# Patient Record
Sex: MALE | Race: WHITE | NOT HISPANIC OR LATINO | Employment: UNEMPLOYED | URBAN - METROPOLITAN AREA
[De-identification: names, ages, dates, MRNs, and addresses within clinical notes are randomized per-mention and may not be internally consistent; named-entity substitution may affect disease eponyms.]

---

## 2019-01-31 ENCOUNTER — MEDICAL CORRESPONDENCE (OUTPATIENT)
Dept: HEALTH INFORMATION MANAGEMENT | Facility: CLINIC | Age: 49
End: 2019-01-31

## 2019-03-04 ENCOUNTER — TRANSFERRED RECORDS (OUTPATIENT)
Dept: HEALTH INFORMATION MANAGEMENT | Facility: CLINIC | Age: 49
End: 2019-03-04

## 2019-12-17 ENCOUNTER — TRANSFERRED RECORDS (OUTPATIENT)
Dept: HEALTH INFORMATION MANAGEMENT | Facility: CLINIC | Age: 49
End: 2019-12-17

## 2020-03-13 ENCOUNTER — VIRTUAL VISIT (OUTPATIENT)
Dept: FAMILY MEDICINE | Facility: OTHER | Age: 50
End: 2020-03-13

## 2020-03-18 NOTE — PROGRESS NOTES
"Date: 2020 14:13:59  Clinician: Ekta Soliman  Clinician NPI: [id number]  Patient: Ming Uribe  Patient : 1970  Patient Address: [address] [place] [address]  Patient Phone: [phone]  Visit Protocol: URI  Patient Summary:  Ming is a 49 year old ( : 1970 ) male who initiated a Visit for COVID-19 (Coronavirus) evaluation and screening. When asked the question \"Please sign me up to receive news, health information and promotions. \", Ming responded \"No\".    Ming states his symptoms started gradually 7-9 days ago. After his symptoms started, they improved and then got worse again.   His symptoms consist of chills, wheezing, a sore throat, a cough, nasal congestion, tooth pain, ear pain, malaise, a headache, rhinitis, facial pain or pressure, and myalgia. He is experiencing mild difficulty breathing with activities but can speak normally in full sentences. Ming also feels feverish.   Symptom details     Nasal secretions: The color of his mucus is yellow.    Cough: Ming coughs every 5-10 minutes and his cough is more bothersome at night. Phlegm does not come into his throat when he coughs. He believes his cough is caused by post-nasal drip.     Sore throat: Ming reports having mild throat pain (1-3 on a 10 point pain scale), does not have exudate on his tonsils, and can swallow liquids. He is not sure if the lymph nodes in his neck are enlarged. A rash has not appeared on the skin since the sore throat started.     Temperature: His current temperature is 98.8 degrees Fahrenheit.     Wheezing: Ming has been diagnosed with asthma. The wheezing interferes with his normal daily activities.    Facial pain or pressure: The facial pain or pressure does not feel worse when bending or leaning forward.     Headache: He states the headache is moderate (4-6 on a 10 point pain scale).     Tooth pain: The tooth pain is not caused by a cavity, recent dental work, or other " mouth problems.      He denies taking antibiotic medication for the symptoms and having recent facial or sinus surgery in the past 60 days.   Precipitating events  Ming is not sure if he has been exposed to someone with strep throat. He has not recently been exposed to someone with influenza. Ming has not been in close contact with any high risk individuals.   Pertinent COVID-19 (Coronavirus) information  Ming has traveled internationally or to the areas where COVID-19 (Coronavirus) is widespread in the last 14 days before the start of his symptoms. Countries or locations traveled as reported by the patient (free text): [facility]   Ming has not had close contact with a laboratory-confirmed positive COVID-19 patient or someone under quarantine for suspected COVID-19 within 14 days of symptom onset.   Ming is not a healthcare worker or does not work in a healthcare facility.   Triage Point(s) temporarily suspended for COVID-19 (Coronavirus) screening  Ming reported the following symptoms which were previously protocol referral points. These protocol referral points have temporarily been removed for purposes of COVID-19 (Coronavirus) screening.   Wheezing that keeps Ming from doing daily activities   Pertinent medical history  Ming had 1 sinus infection within the past year.   Ming needs a return to work/school note.   Weight: 184 lbs   Ming does not smoke or use smokeless tobacco.   Weight: 184 lbs    MEDICATIONS: Spiriva Respimat inhalation, lisinopril oral, Synthroid oral, Ventolin HFA inhalation, Advair HFA inhalation, montelukast oral, ALLERGIES: NKDA  Clinician Response:  Dear Ming,  Based on the information provided, you have viral sinusitis, also known as a sinus infection. Sinus infections are caused by bacteria or a virus and symptoms are almost always identical. The difference between the 2 types of infections is timing.  Sinus infections start as viral infections and  symptoms improve on their own in about 7 days. If symptoms have not improved after 7 days or have even worsened, a bacterial infection may have developed.  Medication information  Because you have a viral infection, antibiotics will not help you get better. Treating a viral infection with antibiotics could actually make you feel worse.  Unless you are allergic to the over-the-counter medication(s) below, I recommend using:       Acetaminophen (Tylenol or store brand) oral tablet. Take 1-2 tablets by mouth every 4-6 hours to help with the discomfort.      Ibuprofen (Advil or store brand) 200 mg oral tablet. Take 1-3 tablets (200-600 mg) by mouth every 8 hours to help with the discomfort. Make sure to take the ibuprofen with food. Do not exceed 2400 mg in 24 hours.    A sinus irrigation kit such as Sinus Rinse, Neti Pot, SinuCleanse, or store brand. Be sure to use sterile or previously boiled water to prevent unwanted infections.      Oxymetazoline (Afrin or store brand) nasal spray. Use 1 spray in each nostril 2 times a day for a maximum of 3 days. Using this medication more frequently or longer than recommended may cause nasal congestion to reoccur or worsen. Sinus pressure occurs when the tissues lining your sinuses become swollen and inflamed. Afrin nasal spray decreases the swelling to provide the quickest and most effective relief from sinus pressure.      Over-the-counter medications do not require a prescription. Ask the pharmacist if you have any questions.  Self care  The following tips will keep you as comfortable as possible while you recover:     Rest    Drink plenty of water and other liquids    Take a hot shower to loosen congestion    Use throat lozenges    Gargle with warm salt water (1/4 teaspoon of salt per 8 ounce glass of water)    Suck on frozen items such as popsicles or ice cubes    Drink hot tea with lemon and honey    Take a spoonful of honey to reduce your cough     When to seek care  Please  be seen in a clinic or urgent care if new symptoms develop, or symptoms become worse.  Call 911 or go to the emergency room if you feel that your throat is closing off, you suddenly develop a rash, you are unable to swallow fluids, you are drooling, or you are having difficulty breathing.  Additional treatment plan   Dear Ming,  Based on the information you have provided, it does not appear you need Coronavirus (COVID-19) testing.   At this time, we recommend testing primarily for those people who have symptoms of cough and fever and have either traveled to a known area of infection or have been exposed to someone with laboratory confirmed Coronavirus by close contact.   Coronavirus - General Information:   The coronavirus infection starts within 14 days of an exposure.  Symptoms are those of a respiratory infection (such as fever, cough).   If you have not had symptoms by day 15, you should be considered uninfected by coronavirus.   Coronavirus - Symptoms:    The coronavirus can cause a respiratory illness, such as bronchitis or pneumonia.  The most common symptoms are: cough, fever, and shortness of breath.   Other symptoms are: body aches, chills, diarrhea, fatigue, headache, runny nose, and sore throat   Coronavirus - Exposure Risk Factors:   Exposure to a person who has been diagnosed with coronavirus.  Travel from an area with recent local transmission of coronavirus.  The CDC (www.cdc.gov) has the most up-to-date list of where the coronavirus outbreak is occurring.   Coronavirus - Spreading:    The virus likely spreads through respiratory droplets produced when a person coughs or sneezes. These respiratory droplets can travel approximately 6 feet and can remain on surfaces. Common disinfectants will kill the virus.  The CDC currently does not recommend healthy people wear masks.   Coronavirus - Protect Yourself:    Avoid close contact with people known to have this new coronavirus infection.  Wash hands  often with soap and water or alcohol-based hand .  Avoid touching the eyes, nose or mouth.   Thank you for limiting contact with others, wearing a simple mask to cover your cough, practice good hand hygiene habits and accessing our virtual services where possible to limit the spread of this virus.  For more information about COVID19 and options for caring for yourself at home, please visit the CDC website at https://www.cdc.gov/coronavirus/2019-ncov/about/steps-when-sick.html   For more options for care at [facility], please visit our website at [email or web address]     COVID-19 (Coronavirus) General Information  With the increase in the number of COVID-19 (Coronavirus) cases, we understand you may have some questions. Below is some helpful information on COVID-19 (Coronavirus).  How can I protect myself and others from the COVID-19 (Coronavirus)?  Because there is currently no vaccine to prevent infection, the best way to protect yourself is to avoid being exposed to this virus. Put distance between yourself and other people if COVID-19 (Coronavirus) is spreading in your community. The virus is thought to spread mainly from person-to-person.     Between people who are in close contact with one another (within about 6 about) for prolonged period (10 minutes or longer).    Through respiratory droplets produced when an infected person coughs or sneezes.     The CDC recommends the following additional steps to protect yourself and others:     Wash your hands often with soap and water for at least 20 seconds, especially after blowing your nose, coughing, or sneezing; going to the bathroom; and before eating or preparing food.  Use an alcohol-based hand  that contains at least 60 percent alcohol if soap and water are not available.        Avoid touching your eyes, nose and mouth with unwashed hands.    Avoid close contact with people who are sick.    Stay home when you  are sick.    Cover your cough or sneeze with a tissue, then throw the tissue in the trash.    Clean and disinfect frequently touched objects and surfaces.     You can help stop COVID-19 (Coronavirus) by knowing the signs and symptoms:     Fever    Cough    Shortness of breath     Contact your healthcare provider if   Develop symptoms   AND   Have been in close contact with a person known to have COVID-19 (Coronavirus) or live in or have recently traveled from an area with ongoing spread of COVID-19 (Coronavirus). Call ahead before you go to a doctor's office or emergency room. Tell them about your recent travel and your symptoms.   For the most up to date information, visit the CDC's website.  Steps to help prevent the spread of COVID-19 (Coronavirus) if you are sick  If you are sick with COVID-19 (Coronavirus) or suspect you are infected with the virus that causes COVID-19 (Coronavirus), follow the steps below to help prevent the disease from spreading&nbsp;to people in your home and community.     Stay home except to get medical care. Home isolation may be started in consultation with your healthcare clinician.    Separate yourself from other people and animals in your home.    Call ahead before visiting your doctor if you have a medical appointment.    Wear a facemask when you are around other people.    Cover your cough and sneezes.    Clean your hands often.    Avoid sharing personal household items.    Clean and disinfect frequently touched objects and surfaces everyday.    You will need to have someone drop off medications or household supplies (if needed) at your house without coming inside or in contact with you or others living in your house.    Monitor your symptoms and seek prompt medical care if your illness is worsening (e.g. Difficulty breathing).    Discontinue home isolation only in consultation with your healthcare provider.     For more detailed and up to date information on what to do if you are  "sick, visit this link: What to Do If You Are Sick With Coronavirus Disease 2019 (COVID-19).  Do I need to be tested for COVID-19 (Coronavirus)?     At this time, the limited number of tests available are controlled by the state and local health departments and are being reserved for more seriously ill patients, those with known exposure to confirmed patients, and those with recent travel (within 14 days) to countries with high rates of COVID-19 (Coronavirus).    Decisions on which patients receive testing will be based on the local spread of COVID-19 (Coronavirus) as well as the symptoms. Your healthcare provider will make the final decision on whether you should be tested.    In the meantime, if you have concerns that you may have been exposed, it is reasonable to practice \"social distancing.\"&nbsp; If you are ill with a cold or flu-like illness, please monitor your symptoms and reach out to your healthcare provider if your symptoms worsen.    For more up to date information, visit this link: COVID-19 (Coronavirus) Frequently Asked Questions and Answers.      Diagnosis: Cough  Diagnosis ICD: R05  Additional Clinician Notes: If your symptoms are not improving over the next 5 days, please submit another [facility] visit for recheck.  If you get a fever of 100.4 degrees or higher, please submit another [facility] visit for recheck.  "

## 2020-04-22 ENCOUNTER — TRANSFERRED RECORDS (OUTPATIENT)
Dept: HEALTH INFORMATION MANAGEMENT | Facility: CLINIC | Age: 50
End: 2020-04-22

## 2020-08-03 ENCOUNTER — TRANSFERRED RECORDS (OUTPATIENT)
Dept: HEALTH INFORMATION MANAGEMENT | Facility: CLINIC | Age: 50
End: 2020-08-03

## 2020-08-27 NOTE — TELEPHONE ENCOUNTER
RECORDS RECEIVED FROM:  - Jaundice and enlarged liver    Appt Date: 09.02.2020   NOTES STATUS DETAILS   OFFICE NOTE from referring provider N/A    OFFICE NOTES from other specialists Internal    Care Everywhere 08.28.2020    Sofi Webster NP        08.25.2020 Alejandro Ko MD  [facility]   DISCHARGE SUMMARY from hospital Care Everywhere 12.21.2019 [place] Novak [place]  [facility]   MEDICATION LIST N/A    LIVER BIOSPY (IF APPLICABLE)      PATHOLOGY REPORTS  N/A    IMAGING     ENDOSCOPY (IF AVAILABLE) N/A    COLONOSCOPY (IF AVAILABLE) N/A    ULTRASOUND LIVER Care Everywhere 08.24.2020 US Liver   CT OF ABDOMEN Care Everywher 08.12.2020 CT Ab Pelvis   MRI OF LIVER N/A    FIBROSCAN, US ELASTOGRAPHY, FIBROSIS SCAN, MR ELASTOGRAPHY N/A    LABS     HEPATIC PANEL (LIVER PANEL) N/A    BASIC METABOLIC PANEL N/A    COMPLETE METABOLIC PANEL Future 08.28.2020   COMPLETE BLOOD COUNT (CBC) Future 08.28.2020   INTERNATIONAL NORMALIZED RATIO (INR) N/A    HEPATITIS C ANTIBODY N/A    HEPATITIS C VIRAL LOAD/PCR N/A    HEPATITIS C GENOTYPE N/A    HEPATITIS B SURFACE ANTIGEN N/A    HEPATITIS B SURFACE ANTIBODY N/A    HEPATITIS B DNA QUANT LEVEL N/A    HEPATITIS B CORE ANTIBODY N/A      Action 08.27.2020 RM   Action Taken Sent fax request to [facility] for records.  Fax to [phone]. Pending    08.31.2020 Can access some of the pt info from , pending for images.     09.01.2020 [place] [facility] [place] verify if they received the fax to push over images, called [phone] opt 2 spoke to a rep who states she received the request and if working on sending the images over.    09.01.2020 All images received and uploaded to pt chart.

## 2020-08-28 ENCOUNTER — OFFICE VISIT (OUTPATIENT)
Dept: FAMILY MEDICINE | Facility: CLINIC | Age: 50
End: 2020-08-28
Payer: COMMERCIAL

## 2020-08-28 VITALS
OXYGEN SATURATION: 98 % | WEIGHT: 175.5 LBS | BODY MASS INDEX: 25.13 KG/M2 | DIASTOLIC BLOOD PRESSURE: 92 MMHG | HEART RATE: 98 BPM | TEMPERATURE: 98.1 F | HEIGHT: 70 IN | SYSTOLIC BLOOD PRESSURE: 131 MMHG | RESPIRATION RATE: 16 BRPM

## 2020-08-28 DIAGNOSIS — J45.40 MODERATE PERSISTENT ASTHMA WITHOUT COMPLICATION: ICD-10-CM

## 2020-08-28 DIAGNOSIS — F10.21 ALCOHOL DEPENDENCE IN REMISSION (H): ICD-10-CM

## 2020-08-28 DIAGNOSIS — Z00.00 ENCOUNTER FOR MEDICAL EXAMINATION TO ESTABLISH CARE: ICD-10-CM

## 2020-08-28 DIAGNOSIS — E03.9 HYPOTHYROIDISM, UNSPECIFIED TYPE: ICD-10-CM

## 2020-08-28 DIAGNOSIS — R17 JAUNDICE: Primary | ICD-10-CM

## 2020-08-28 RX ORDER — MONTELUKAST SODIUM 10 MG/1
10 TABLET ORAL AT BEDTIME
COMMUNITY
Start: 2019-12-13 | End: 2021-06-01

## 2020-08-28 RX ORDER — GLUCOSA SU 2KCL/CHONDROITIN SU 500-400 MG
1 CAPSULE ORAL
COMMUNITY
Start: 2020-08-25

## 2020-08-28 RX ORDER — ALBUTEROL SULFATE 90 UG/1
1-2 AEROSOL, METERED RESPIRATORY (INHALATION)
COMMUNITY
Start: 2019-12-13 | End: 2020-12-15

## 2020-08-28 RX ORDER — GABAPENTIN 100 MG/1
100 CAPSULE ORAL
COMMUNITY
Start: 2020-08-25 | End: 2020-09-18

## 2020-08-28 RX ORDER — LEVOTHYROXINE SODIUM 175 UG/1
150 TABLET ORAL DAILY
COMMUNITY
Start: 2019-12-13 | End: 2021-01-05

## 2020-08-28 ASSESSMENT — ANXIETY QUESTIONNAIRES
1. FEELING NERVOUS, ANXIOUS, OR ON EDGE: NOT AT ALL
6. BECOMING EASILY ANNOYED OR IRRITABLE: NOT AT ALL
GAD7 TOTAL SCORE: 2
7. FEELING AFRAID AS IF SOMETHING AWFUL MIGHT HAPPEN: SEVERAL DAYS
2. NOT BEING ABLE TO STOP OR CONTROL WORRYING: NOT AT ALL
3. WORRYING TOO MUCH ABOUT DIFFERENT THINGS: SEVERAL DAYS
5. BEING SO RESTLESS THAT IT IS HARD TO SIT STILL: NOT AT ALL
IF YOU CHECKED OFF ANY PROBLEMS ON THIS QUESTIONNAIRE, HOW DIFFICULT HAVE THESE PROBLEMS MADE IT FOR YOU TO DO YOUR WORK, TAKE CARE OF THINGS AT HOME, OR GET ALONG WITH OTHER PEOPLE: SOMEWHAT DIFFICULT

## 2020-08-28 ASSESSMENT — PATIENT HEALTH QUESTIONNAIRE - PHQ9
5. POOR APPETITE OR OVEREATING: NOT AT ALL
SUM OF ALL RESPONSES TO PHQ QUESTIONS 1-9: 12

## 2020-08-28 ASSESSMENT — MIFFLIN-ST. JEOR: SCORE: 1672.31

## 2020-08-28 ASSESSMENT — PAIN SCALES - GENERAL: PAINLEVEL: EXTREME PAIN (9)

## 2020-08-28 NOTE — NURSING NOTE
"49 year old  Chief Complaint   Patient presents with     [facility]     Patient comes in to [facility].        Blood pressure (!) 131/92, pulse 98, temperature 98.1  F (36.7  C), temperature source Oral, resp. rate 16, height 1.778 m (5' 10\"), weight 79.6 kg (175 lb 8 oz), SpO2 98 %. Body mass index is 25.18 kg/m .  BP completed using cuff size:    There is no problem list on file for this patient.      Wt Readings from Last 2 Encounters:   08/28/20 79.6 kg (175 lb 8 oz)     BP Readings from Last 3 Encounters:   08/28/20 (!) 131/92       Allergies   Allergen Reactions     No Clinical Screening - See Comments Other (See Comments)     Grass, weeds, mold, dust       Current Outpatient Medications   Medication     albuterol (PROAIR HFA/PROVENTIL HFA/VENTOLIN HFA) 108 (90 Base) MCG/ACT inhaler     fluticasone-salmeterol (ADVAIR) 250-50 MCG/DOSE inhaler     levothyroxine (SYNTHROID/LEVOTHROID) 175 MCG tablet     montelukast (SINGULAIR) 10 MG tablet     No current facility-administered medications for this visit.        Social History     Tobacco Use     Smoking status: Never Smoker     Smokeless tobacco: Never Used   Substance Use Topics     Alcohol use: None     Drug use: None       Honoring Choices - Health Care Directive Guide offered to patient at time of visit.    Health Maintenance Due   Topic Date Due     PREVENTIVE CARE VISIT  1970     HIV SCREENING  12/30/1986     DTAP/TDAP/TD IMMUNIZATION (1 - Tdap) 12/30/1996     LIPID  12/30/2006     PHQ-2  01/01/2020     INFLUENZA VACCINE (1) 09/01/2020         There is no immunization history on file for this patient.    No results found for: PAP      No lab results found.    No flowsheet data found.    No flowsheet data found.    No flowsheet data found.    No flowsheet data found.    Gamal Roland, ALISSA  August 28, 2020 10:45 AM      Healthy Habits:    Do you get at least three servings of calcium containing foods daily (dairy, green leafy vegetables, etc.)? 3 " servings of dairy, fruits. No veggies, grains.    Amount of exercise or daily activities, outside of work: 0 hours    Have you had an eye exam in the past two years? Yes    Do you see a dentist twice per year? Yes    Do you have sleep apnea, excessive snoring or daytime drowsiness? No    Sleep: 4-5 hours/night. Sleep disruptions: Yes, wakes up 2-3 times per night.    Caffeine: One cup green tea daily.    Water: Ten 10oz. Glasses daily.    How much exercise per week?  How much calcium per day?      How much caffeine per day?   How much vitamin D per day?   Do you/your family wear seatbelts? Yes  Do you/your family use safety helmets? Yes  Do you/your family use sunscreen? Yes  Do you/your family keep firearms in the home? No  Do you/your family have a smoke detector(s)? Yes    Do you feel safe in your home? Yes  Has anyone ever touched you in an unwanted manner?   Explain. No
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
## 2020-08-28 NOTE — PATIENT INSTRUCTIONS

## 2020-08-29 ASSESSMENT — ANXIETY QUESTIONNAIRES: GAD7 TOTAL SCORE: 2

## 2020-08-31 DIAGNOSIS — Z00.00 ENCOUNTER FOR MEDICAL EXAMINATION TO ESTABLISH CARE: ICD-10-CM

## 2020-08-31 DIAGNOSIS — R17 JAUNDICE: ICD-10-CM

## 2020-08-31 DIAGNOSIS — E03.9 HYPOTHYROIDISM, UNSPECIFIED TYPE: ICD-10-CM

## 2020-08-31 LAB
ALBUMIN SERPL-MCNC: 2.7 G/DL (ref 3.4–5)
ALP SERPL-CCNC: 148 U/L (ref 40–150)
ALT SERPL W P-5'-P-CCNC: 244 U/L (ref 0–70)
ANION GAP SERPL CALCULATED.3IONS-SCNC: 10 MMOL/L (ref 3–14)
AST SERPL W P-5'-P-CCNC: 341 U/L (ref 0–45)
BILIRUB SERPL-MCNC: 10.5 MG/DL (ref 0.2–1.3)
BUN SERPL-MCNC: 9 MG/DL (ref 7–30)
CALCIUM SERPL-MCNC: 9.2 MG/DL (ref 8.5–10.1)
CHLORIDE SERPL-SCNC: 100 MMOL/L (ref 94–109)
CHOLEST SERPL-MCNC: 290 MG/DL
CO2 SERPL-SCNC: 23 MMOL/L (ref 20–32)
CREAT SERPL-MCNC: 0.74 MG/DL (ref 0.66–1.25)
ERYTHROCYTE [DISTWIDTH] IN BLOOD BY AUTOMATED COUNT: 13.5 % (ref 10–15)
GFR SERPL CREATININE-BSD FRML MDRD: >90 ML/MIN/{1.73_M2}
GLUCOSE SERPL-MCNC: 130 MG/DL (ref 70–99)
HCT VFR BLD AUTO: 38.2 % (ref 40–53)
HDLC SERPL-MCNC: 12 MG/DL
HGB BLD-MCNC: 12.3 G/DL (ref 13.3–17.7)
LDLC SERPL CALC-MCNC: ABNORMAL MG/DL
LDLC SERPL DIRECT ASSAY-MCNC: 246 MG/DL
MCH RBC QN AUTO: 35.2 PG (ref 26.5–33)
MCHC RBC AUTO-ENTMCNC: 32.2 G/DL (ref 31.5–36.5)
MCV RBC AUTO: 110 FL (ref 78–100)
NONHDLC SERPL-MCNC: 279 MG/DL
PLATELET # BLD AUTO: 528 10E9/L (ref 150–450)
POTASSIUM SERPL-SCNC: 3.5 MMOL/L (ref 3.4–5.3)
PROT SERPL-MCNC: 7.1 G/DL (ref 6.8–8.8)
RBC # BLD AUTO: 3.49 10E12/L (ref 4.4–5.9)
SODIUM SERPL-SCNC: 133 MMOL/L (ref 133–144)
TRIGL SERPL-MCNC: 471 MG/DL
TSH SERPL DL<=0.005 MIU/L-ACNC: 1.22 MU/L (ref 0.4–4)
WBC # BLD AUTO: 9 10E9/L (ref 4–11)

## 2020-09-02 ENCOUNTER — PRE VISIT (OUTPATIENT)
Dept: GASTROENTEROLOGY | Facility: CLINIC | Age: 50
End: 2020-09-02

## 2020-09-02 ENCOUNTER — VIRTUAL VISIT (OUTPATIENT)
Dept: GASTROENTEROLOGY | Facility: CLINIC | Age: 50
End: 2020-09-02
Attending: INTERNAL MEDICINE
Payer: COMMERCIAL

## 2020-09-02 DIAGNOSIS — G62.1 ALCOHOL-INDUCED POLYNEUROPATHY (H): ICD-10-CM

## 2020-09-02 DIAGNOSIS — K70.10 ALCOHOLIC HEPATITIS WITHOUT ASCITES (H): Primary | ICD-10-CM

## 2020-09-02 RX ORDER — GABAPENTIN 300 MG/1
300 CAPSULE ORAL 3 TIMES DAILY
Qty: 90 CAPSULE | Refills: 4 | Status: SHIPPED | OUTPATIENT
Start: 2020-09-02 | End: 2021-04-21

## 2020-09-02 ASSESSMENT — PAIN SCALES - GENERAL: PAINLEVEL: SEVERE PAIN (6)
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
## 2020-09-03 ENCOUNTER — NURSE TRIAGE (OUTPATIENT)
Dept: NURSING | Facility: CLINIC | Age: 50
End: 2020-09-03

## 2020-09-03 NOTE — TELEPHONE ENCOUNTER
Brandon calling today reporting over the past 3 days he has been experiencing intermittent episodes of dizziness, lasts 1-2 minutes, occurs when stands up.  He did tell GI provider this yesterday at virtual visit, was advised most likely due to dehydration, which Ming reports could be possible, as reports hasn't been drinking much water.  Today , after he called because experienced dizziness, drank a bottle of gatorade and feels much better now. Advised to continue with good fluid intake, good diet.  Agrees to appointment before current one scheduled for 9/11/20 with Sofi Webster.    Has been instructed ifdizziness continues, increases, occurs more often , accompanied by chest pain, shortness of breath is to call 911/ED.  Agrees    Additional Information    Negative: Shock suspected (e.g., cold/pale/clammy skin, too weak to stand, low BP, rapid pulse)    Negative: Difficult to awaken or acting confused (e.g., disoriented, slurred speech)    Negative: Fainted, and still feels dizzy afterwards    Negative: Severe difficulty breathing (e.g., struggling for each breath, speaks in single words)    Negative: Overdose (accidental or intentional) of medications    Negative: New neurologic deficit that is present now: * Weakness of the face, arm, or leg on one side of the body * Numbness of the face, arm, or leg on one side of the body * Loss of speech or garbled speech    Negative: Heart beating < 50 beats per minute OR > 140 beats per minute    Negative: Sounds like a life-threatening emergency to the triager    Negative: Chest pain    Negative: Rectal bleeding, bloody stool, or tarry-black stool    Negative: Vomiting is the main symptom    Negative: Diarrhea is the main symptom    Negative: Headache is the main symptom    Negative: Heat exhaustion suspected (i.e., dehydration from heat exposure)    Negative: Patient states that he/she is having an anxiety/panic attack    Negative: SEVERE dizziness (e.g., unable to  "stand, requires support to walk, feels like passing out now)    Negative: SEVERE headache or neck pain    Negative: Spinning or tilting sensation (vertigo) present now and one or more stroke risk factors (i.e., hypertension, diabetes, prior stroke/TIA, heart attack, age over 60) (Exception: prior physician evaluation for this AND no different/worse than usual)    Negative: Loss of vision or double vision    Negative: Extra heart beats OR irregular heart beating (i.e., 'palpitations')    Negative: Difficulty breathing    Negative: Drinking very little and has signs of dehydration (e.g., no urine > 12 hours, very dry mouth, very lightheaded)    Negative: Follows bleeding (e.g., stomach, rectum, vagina) (Exception: became dizzy from sight of small amount blood)    Negative: Patient sounds very sick or weak to the triager    Negative: Lightheadedness (dizziness) present now, after 2 hours of rest and fluids    Negative: Spinning or tilting sensation (vertigo) present now    Negative: Fever > 103 F (39.4 C)    Negative: Fever > 100.0 F (37.8 C) and has diabetes mellitus or a weak immune system (e.g., HIV positive, cancer chemotherapy, organ transplant, splenectomy, chronic steroids)    Patient wants to be seen    Answer Assessment - Initial Assessment Questions  1. DESCRIPTION: \"Describe your dizziness.\"      dizziness  2. LIGHTHEADED: \"Do you feel lightheaded?\" (e.g., somewhat faint, woozy, weak upon standing)      Lightheaded, feels may pass out, everything turns white and feels weak  3. VERTIGO: \"Do you feel like either you or the room is spinning or tilting?\" (i.e. vertigo)      No  4. SEVERITY: \"How bad is it?\"  \"Do you feel like you are going to faint?\" \"Can you stand and walk?\"    - MILD - walking normally    - MODERATE - interferes with normal activities (e.g., work, school)     - SEVERE - unable to stand, requires support to walk, feels like passing out now.       When occurs severe  5. ONSET:  \"When did the " "dizziness begin?\"      When stands up, lasts about 1 minute  6. AGGRAVATING FACTORS: \"Does anything make it worse?\" (e.g., standing, change in head position)      Standing makes it worse  7. HEART RATE: \"Can you tell me your heart rate?\" \"How many beats in 15 seconds?\"  (Note: not all patients can do this)        96 beats per minute, oxygen saturation 96 on room air  8. CAUSE: \"What do you think is causing the dizziness?\"      He talked to GI yesterday was told probably dehydrated  9. RECURRENT SYMPTOM: \"Have you had dizziness before?\" If so, ask: \"When was the last time?\" \"What happened that time?\"      Started upon discharge 8/15/20, last one experienced was 8/18, then restarted 3 days ago  10. OTHER SYMPTOMS: \"Do you have any other symptoms?\" (e.g., fever, chest pain, vomiting, diarrhea, bleeding)        Denies fever, chest pain, vomiting, diarrhea or bleeding  11. PREGNANCY: \"Is there any chance you are pregnant?\" \"When was your last menstrual period?\"        N/A    Protocols used: DIZZINESS-A-OH      "

## 2020-09-04 ENCOUNTER — OFFICE VISIT (OUTPATIENT)
Dept: FAMILY MEDICINE | Facility: CLINIC | Age: 50
End: 2020-09-04
Payer: COMMERCIAL

## 2020-09-04 VITALS
OXYGEN SATURATION: 95 % | SYSTOLIC BLOOD PRESSURE: 104 MMHG | DIASTOLIC BLOOD PRESSURE: 72 MMHG | HEIGHT: 70 IN | WEIGHT: 176.9 LBS | TEMPERATURE: 98.1 F | RESPIRATION RATE: 16 BRPM | BODY MASS INDEX: 25.33 KG/M2 | HEART RATE: 98 BPM

## 2020-09-04 DIAGNOSIS — D64.9 ANEMIA, UNSPECIFIED TYPE: ICD-10-CM

## 2020-09-04 DIAGNOSIS — R42 DIZZINESS: ICD-10-CM

## 2020-09-04 DIAGNOSIS — K70.10 ALCOHOLIC HEPATITIS WITHOUT ASCITES (H): Primary | ICD-10-CM

## 2020-09-04 PROBLEM — R17 JAUNDICE: Status: ACTIVE | Noted: 2020-08-12

## 2020-09-04 ASSESSMENT — ASTHMA QUESTIONNAIRES
ACUTE_EXACERBATION_TODAY: NO
QUESTION_2 LAST FOUR WEEKS HOW OFTEN HAVE YOU HAD SHORTNESS OF BREATH: MORE THAN ONCE A DAY
QUESTION_1 LAST FOUR WEEKS HOW MUCH OF THE TIME DID YOUR ASTHMA KEEP YOU FROM GETTING AS MUCH DONE AT WORK, SCHOOL OR AT HOME: A LITTLE OF THE TIME
QUESTION_5 LAST FOUR WEEKS HOW WOULD YOU RATE YOUR ASTHMA CONTROL: POORLY CONTROLLED
QUESTION_3 LAST FOUR WEEKS HOW OFTEN DID YOUR ASTHMA SYMPTOMS (WHEEZING, COUGHING, SHORTNESS OF BREATH, CHEST TIGHTNESS OR PAIN) WAKE YOU UP AT NIGHT OR EARLIER THAN USUAL IN THE MORNING: NOT AT ALL
QUESTION_4 LAST FOUR WEEKS HOW OFTEN HAVE YOU USED YOUR RESCUE INHALER OR NEBULIZER MEDICATION (SUCH AS ALBUTEROL): ONE OR TWO TIMES PER DAY
ACT_TOTALSCORE: 14

## 2020-09-04 ASSESSMENT — PAIN SCALES - GENERAL: PAINLEVEL: MODERATE PAIN (4)

## 2020-09-04 ASSESSMENT — MIFFLIN-ST. JEOR: SCORE: 1678.66

## 2020-09-04 NOTE — PROGRESS NOTES
"Mr. Uribe is a 49 year old male with history of alcoholic hepatitis, hypothyroid, asthma, and anemia who presents with dizziness.    History of Present Illness:    3-4 day history of dizziness. Hospitalized in August with alcoholic hepatitis which has shown some improvement.       Dizziness is Intermittent lasting 1-2 hrs with blurred vision, weakness, confusion.  He notes the dizziness is positional with bending over and increases as the day goes on.  Improved with no episodes today.  He is attributing the dizziness to dehydration, though also wonders if he might have some sinus congestion.  Drinking 10-12 glasses water/day and Gatoarde 1.5 L   Appetite diminished. Eating fruits, vegetable, grains, yogurt. No nausea/vomiting.  No abdominal  Bloating. Reports urine clear today.       Past Medical History:      Active Meds:  Current Outpatient Medications   Medication     albuterol (PROAIR HFA/PROVENTIL HFA/VENTOLIN HFA) 108 (90 Base) MCG/ACT inhaler     fluticasone-salmeterol (ADVAIR) 250-50 MCG/DOSE inhaler     gabapentin (NEURONTIN) 100 MG capsule     gabapentin (NEURONTIN) 300 MG capsule     levothyroxine (SYNTHROID/LEVOTHROID) 175 MCG tablet     montelukast (SINGULAIR) 10 MG tablet     Multiple Vitamins-Minerals (THERAPEUTIC-M) TABS     umeclidinium (INCRUSE ELLIPTA) 62.5 MCG/INH inhaler     No current facility-administered medications for this visit.         Allergies:  Reviewed, refer to EMR      A full 10-pt Review of Systems was performed, verified and is negative except as documented in the HPI.  All health questionnaires were reviewed, verified and relevant information documented above.      Physical Exam:  Vitals: /72 (BP Location: Right arm, Patient Position: Sitting, Cuff Size: Adult Regular)   Pulse 98   Temp 98.1  F (36.7  C) (Oral)   Resp 16   Ht 1.778 m (5' 10\")   Wt 80.2 kg (176 lb 14.4 oz)   SpO2 95%   BMI 25.38 kg/m    Constitutional: Alert, oriented, pleasant, no acute " distress  Head: Normocephalic, atraumatic  Eyes: Extra-ocular movements intact, pupils equally round and reactive bilaterally.  Positive for scleral icterus, improved from last week.    ENT: Oropharynx clear, moist mucus membranes, good dentition  Neck: Supple, no lymphadenopathy, no thyromegaly, no JVD  Cardiovascular: Regular rate and rhythm, no murmurs, rubs or gallops, peripheral pulses full/symmetric  Respiratory: Good air movement bilaterally, lungs clear, no wheezes/rales/rhonchi  GI: Abdomen soft, bowel sounds present, nondistended, no organomegaly or masses, no rebound/guarding.  Positive for mild RUQ tenderness.    Musculoskeletal: No edema, normal muscle tone.  Positive for unsteady gait  Neurologic: Alert and oriented, cranial nerves 2-12 intact, strength 5/5 throughout, sensation to light touch intact, reflexes 3+ bilaterally. Fine tremor of hands bilaterally.  Skin: Jaundiced throughout  Psychiatric: normal mentation, affect and mood      Assessment and Plan:    (K70.10) Alcoholic hepatitis without ascites  (primary encounter diagnosis)  (R42) Dizziness  (D64.9) Anemia, unspecified type  Comment:  Chief complaint of dizziness most likely due to his liver disease in combination with dehydration. Improving with increased fluids.  Will check labs in 2 weeks and plan follow-up phone visit.       Plan: Will check labs in 2 weeks and plan follow-up phone visit.     Comprehensive metabolic panel, CBC with platelets, Ammonia, Bilirubin Direct and Total             #Routine Health Maintenence:  Immunizations (zoster, pneumovax, flu, Tdap, Hep A/B):   There is no immunization history on file for this patient.  Lipids:   Recent Labs   Lab Test 08/31/20  1016   CHOL 290*   HDL 12*   LDL Cannot estimate LDL when triglyceride exceeds 400 mg/dL  246*   TRIG 471*       Return to clinic:  Phone visit in 2 weeks.      Sofi Webster, ANP, AGACNP  Nurse Practioner

## 2020-09-04 NOTE — NURSING NOTE
49 year old  Chief Complaint   Patient presents with     Dizziness     Patient complains of dizziness for the past 4 days.        There were no vitals taken for this visit. There is no height or weight on file to calculate BMI.  BP completed using cuff size:    There is no problem list on file for this patient.      Wt Readings from Last 2 Encounters:   08/28/20 79.6 kg (175 lb 8 oz)     BP Readings from Last 3 Encounters:   08/28/20 (!) 131/92       Allergies   Allergen Reactions     No Clinical Screening - See Comments Other (See Comments)     Grass, weeds, mold, dust       Current Outpatient Medications   Medication     albuterol (PROAIR HFA/PROVENTIL HFA/VENTOLIN HFA) 108 (90 Base) MCG/ACT inhaler     fluticasone-salmeterol (ADVAIR) 250-50 MCG/DOSE inhaler     gabapentin (NEURONTIN) 100 MG capsule     gabapentin (NEURONTIN) 300 MG capsule     levothyroxine (SYNTHROID/LEVOTHROID) 175 MCG tablet     montelukast (SINGULAIR) 10 MG tablet     Multiple Vitamins-Minerals (THERAPEUTIC-M) TABS     umeclidinium (INCRUSE ELLIPTA) 62.5 MCG/INH inhaler     No current facility-administered medications for this visit.        Social History     Tobacco Use     Smoking status: Never Smoker     Smokeless tobacco: Never Used   Substance Use Topics     Alcohol use: Not on file     Drug use: Not on file       Honoring Choices - Health Care Directive Guide offered to patient at time of visit.    Health Maintenance Due   Topic Date Due     PREVENTIVE CARE VISIT  1970     ASTHMA ACTION PLAN  1971     ASTHMA CONTROL TEST  1971     HIV SCREENING  12/30/1986     DTAP/TDAP/TD IMMUNIZATION (1 - Tdap) 12/30/1996     INFLUENZA VACCINE (1) 09/01/2020         There is no immunization history on file for this patient.    No results found for: PAP      Recent Labs   Lab Test 08/31/20  1016   LDL Cannot estimate LDL when triglyceride exceeds 400 mg/dL  246*   HDL 12*   TRIG 471*   *   CR 0.74   GFRESTIMATED >90    GFRESTBLACK >90   ALBUMIN 2.7*   POTASSIUM 3.5   TSH 1.22       No flowsheet data found.    PHQ-9 SCORE 8/28/2020   PHQ-9 Total Score 12       ETHAN-7 SCORE 8/28/2020   Total Score 2       No flowsheet data found.    Gamal Roland, EMT  September 4, 2020 11:47 AM

## 2020-09-04 NOTE — PATIENT INSTRUCTIONS

## 2020-09-05 ASSESSMENT — ASTHMA QUESTIONNAIRES: ACT_TOTALSCORE: 14

## 2020-09-16 DIAGNOSIS — K70.10 ALCOHOLIC HEPATITIS WITHOUT ASCITES (H): ICD-10-CM

## 2020-09-16 LAB
ALBUMIN SERPL-MCNC: 3.5 G/DL (ref 3.4–5)
ALP SERPL-CCNC: 108 U/L (ref 40–150)
ALT SERPL W P-5'-P-CCNC: 132 U/L (ref 0–70)
AMMONIA PLAS-SCNC: <10 UMOL/L (ref 10–50)
ANION GAP SERPL CALCULATED.3IONS-SCNC: 7 MMOL/L (ref 3–14)
AST SERPL W P-5'-P-CCNC: 122 U/L (ref 0–45)
BILIRUB DIRECT SERPL-MCNC: 2.1 MG/DL (ref 0–0.2)
BILIRUB SERPL-MCNC: 2.4 MG/DL (ref 0.2–1.3)
BUN SERPL-MCNC: 10 MG/DL (ref 7–30)
CALCIUM SERPL-MCNC: 10 MG/DL (ref 8.5–10.1)
CHLORIDE SERPL-SCNC: 104 MMOL/L (ref 94–109)
CO2 SERPL-SCNC: 26 MMOL/L (ref 20–32)
CREAT SERPL-MCNC: 0.84 MG/DL (ref 0.66–1.25)
ERYTHROCYTE [DISTWIDTH] IN BLOOD BY AUTOMATED COUNT: 12 % (ref 10–15)
GFR SERPL CREATININE-BSD FRML MDRD: >90 ML/MIN/{1.73_M2}
GLUCOSE SERPL-MCNC: 117 MG/DL (ref 70–99)
HCT VFR BLD AUTO: 39.7 % (ref 40–53)
HGB BLD-MCNC: 12.6 G/DL (ref 13.3–17.7)
MCH RBC QN AUTO: 33.8 PG (ref 26.5–33)
MCHC RBC AUTO-ENTMCNC: 31.7 G/DL (ref 31.5–36.5)
MCV RBC AUTO: 106 FL (ref 78–100)
PLATELET # BLD AUTO: 594 10E9/L (ref 150–450)
POTASSIUM SERPL-SCNC: 4.1 MMOL/L (ref 3.4–5.3)
PROT SERPL-MCNC: 8 G/DL (ref 6.8–8.8)
RBC # BLD AUTO: 3.73 10E12/L (ref 4.4–5.9)
SODIUM SERPL-SCNC: 137 MMOL/L (ref 133–144)
WBC # BLD AUTO: 7.3 10E9/L (ref 4–11)

## 2020-09-17 ENCOUNTER — TELEPHONE (OUTPATIENT)
Dept: GASTROENTEROLOGY | Facility: CLINIC | Age: 50
End: 2020-09-17

## 2020-09-17 NOTE — TELEPHONE ENCOUNTER
Pt had labs drawn yesterday that are the same as Dr. Piña would order, no need for another lab draw. LVM updating pt.    Michelle Chase LPN  Hepatology Clinic    -------   Health Call Center    Phone Message    May a detailed message be left on voicemail: yes     Reason for Call: Order(s): Other:   Reason for requested: Hepatology labs  Date needed: prior to 9/25 video visit with Dr. Piña, pt is asking IF he can do labs on Thurs 9/24?  Provider name: Dr. Samir Piña      Action Taken: Message routed to:  [facility]  Hepatology    Travel Screening: Not Applicable

## 2020-09-18 ENCOUNTER — VIRTUAL VISIT (OUTPATIENT)
Dept: FAMILY MEDICINE | Facility: CLINIC | Age: 50
End: 2020-09-18
Payer: COMMERCIAL

## 2020-09-18 DIAGNOSIS — J45.40 MODERATE PERSISTENT ASTHMA WITHOUT COMPLICATION: ICD-10-CM

## 2020-09-18 DIAGNOSIS — L29.9 ITCHING: ICD-10-CM

## 2020-09-18 DIAGNOSIS — R42 DIZZINESS: ICD-10-CM

## 2020-09-18 DIAGNOSIS — G62.1 ALCOHOL-INDUCED POLYNEUROPATHY (H): ICD-10-CM

## 2020-09-18 DIAGNOSIS — K70.10 ALCOHOLIC HEPATITIS WITHOUT ASCITES (H): Primary | ICD-10-CM

## 2020-09-18 DIAGNOSIS — R79.89 ELEVATED PLATELET COUNT: ICD-10-CM

## 2020-09-18 RX ORDER — AMMONIUM LACTATE 12 G/100G
1 CREAM TOPICAL 2 TIMES DAILY
COMMUNITY
End: 2021-01-05

## 2020-09-18 ASSESSMENT — PAIN SCALES - GENERAL: PAINLEVEL: NO PAIN (0)

## 2020-09-18 NOTE — PROGRESS NOTES
Mr. Uribe is a 49 year old male with history of alcoholic hepatitis, asthma, dizziness and cognition changes, and peripheral neuropathy who presents for follow-up.    History of Present Illness:    Hepatitis/Jaundice:  Jaundice is much improved, urine yellow, clear.  Energy levels better. No abdominal pain.  GI appointment next week Thursday    Cognition:    Improving by the week.  No memory troubles or difficulty with problem solving.    Dizziness:  Resolved.  No balance problems.   Biking 15 miles daily 2X/week.      Peripheral Neuropathy, back pain:  Taking gabapentin TID.  Not convinced its helping.  Stretching, heat, ice seem to work better    Asthma:  Uses rescue inhaler daily 2-3 puffs  Uses Advair twice daily X 30+ years  Had been on Spirva over one year.  Changed to Incluse 5 weeks ago.  Since then noted throat irritation and vocal raspiness.  Seen by ENT yesterday and stopped the Inclusa on his own this morning.  Breathing is fine, voice improved after acupuncture yesterday, vocal raspiness returned this morning.  F/U with ENT in 4 weeks.    Checks O2Sats daily- 94-96%  No asthma-related hospitalizations    Alcohol:  None    Abdominal Itching:    Itching of the abdomen at his beltline.  Denies rash, lesions, redness.  Mostly notices when he is sitting quietly.  Has not tried the ammonium lactate cream.      Past Medical History:  No past medical history on file.    Active Meds:  Current Outpatient Medications   Medication     albuterol (PROAIR HFA/PROVENTIL HFA/VENTOLIN HFA) 108 (90 Base) MCG/ACT inhaler     fluticasone-salmeterol (ADVAIR) 250-50 MCG/DOSE inhaler     gabapentin (NEURONTIN) 300 MG capsule     levothyroxine (SYNTHROID/LEVOTHROID) 175 MCG tablet     montelukast (SINGULAIR) 10 MG tablet     Multiple Vitamins-Minerals (THERAPEUTIC-M) TABS     umeclidinium (INCRUSE ELLIPTA) 62.5 MCG/INH inhaler     No current facility-administered medications for this visit.         Allergies:  Reviewed,  refer to EMR    Relevant Social History:    Alcohol:  None  Exercise/Activity:  Biking 15 miles, 2X/week    A full 10-pt Review of Systems was performed, verified and is negative except as documented in the HPI.  All health questionnaires were reviewed, verified and relevant information documented above.      Physical Exam:  Vitals: There were no vitals taken for this visit.  Constitutional: Alert, oriented, pleasant, no acute distress  Head: Normocephalic, atraumatic  Respiratory:  Speaking in full sentences, no audible wheezing.    Neurologic: Alert and oriented   Skin: Jaundice present but significantly improved   Psychiatric: normal mentation, affect and mood    Assessment and Plan:    (K70.10) Alcoholic hepatitis without ascites  (primary encounter diagnosis)  Comment:  Labs and jaundice significantly improved. Maintaining abstinence from alcohol.    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  Pt may discontinue the gabapentin if he doesn't feel it's helping.      (J45.40) Moderate persistent asthma without complication  Comment:  ENT visit yesterday from throat irritation and raspy voice.  Discontinued Inclusa yesterday.  Does not note any increased breathing difficulty. Plan: Will follow-up with ENT if no improvement.  Advised pulmonology as next step.    (R79.89) Elevated platelet count  Comment:  Unknown etiology  Plan:  Refer to hematology    (R42) Dizziness  Comment:  Much improved, cognition clear  Plan:  Monitor    (L29.9) Itching  Comment:  At beltline  Plan:  Advised trying the ammonium lactate cream.    #Routine Health Maintenence:  Immunizations (zoster, pneumovax, flu, Tdap, Hep A/B):   Most Recent Immunizations   Administered Date(s) Administered     Flu, Unspecified 09/10/2020     Lipids:   Recent Labs   Lab Test 08/31/20  1016   CHOL 290*   HDL 12*   LDL Cannot estimate LDL when  triglyceride exceeds 400 mg/dL  246*   TRIG 471*       Return to clinic:  Video visit in 1 month and as needed    Sofi Webster ANP, AGACNP  Nurse Practioner

## 2020-09-21 ENCOUNTER — TELEPHONE (OUTPATIENT)
Dept: ONCOLOGY | Facility: CLINIC | Age: 50
End: 2020-09-21

## 2020-09-22 NOTE — TELEPHONE ENCOUNTER
RECORDS STATUS - ALL OTHER DIAGNOSIS      RECORDS RECEIVED FROM: [facility]   DATE RECEIVED: 10/13/2020    NOTES STATUS DETAILS   OFFICE NOTE from referring provider Sofi Smith NP   OFFICE NOTE from medical oncologist N/A    DISCHARGE SUMMARY from hospital Complete CE- 8/12/2020 alcoholic liver failure    DISCHARGE REPORT from the ER     OPERATIVE REPORT N/A    MEDICATION LIST Complete [facility]   CLINICAL TRIAL TREATMENTS TO DATE     LABS     PATHOLOGY REPORTS     ANYTHING RELATED TO DIAGNOSIS Complete Labs [facility]    Labs last updated in CE ([facility]) 8/25/2020     GENONOMIC TESTING     TYPE:     IMAGING (NEED IMAGES & REPORT)     CT SCANS Complete-[facility] CT abdomen Pelvis 8/12/2019     CT Spine Lumbar 8/12/2020    MRI     MAMMO     ULTRASOUND Complete-[facility] US Liver 8/24/2020    PET

## 2020-09-22 NOTE — TELEPHONE ENCOUNTER
Oncology/Surgical Oncology Referral Request:     Specialty Requested: Medical Oncology     Referring Provider: Sofi Webster NP     Referring Clinic/Organization: [facility]    Records location: [facility]     Requested Provider (if specified): Not Specified

## 2020-09-25 ENCOUNTER — TELEPHONE (OUTPATIENT)
Dept: GASTROENTEROLOGY | Facility: CLINIC | Age: 50
End: 2020-09-25

## 2020-09-25 ENCOUNTER — VIRTUAL VISIT (OUTPATIENT)
Dept: GASTROENTEROLOGY | Facility: CLINIC | Age: 50
End: 2020-09-25
Attending: INTERNAL MEDICINE
Payer: COMMERCIAL

## 2020-09-25 VITALS — WEIGHT: 165 LBS | BODY MASS INDEX: 23.62 KG/M2 | HEIGHT: 70 IN

## 2020-09-25 DIAGNOSIS — K70.10 ALCOHOLIC HEPATITIS WITHOUT ASCITES (H): Primary | ICD-10-CM

## 2020-09-25 DIAGNOSIS — N52.1 ERECTILE DYSFUNCTION DUE TO DISEASES CLASSIFIED ELSEWHERE: ICD-10-CM

## 2020-09-25 RX ORDER — SILDENAFIL 50 MG/1
50 TABLET, FILM COATED ORAL DAILY PRN
Qty: 6 TABLET | Refills: 4 | Status: SHIPPED | OUTPATIENT
Start: 2020-09-25 | End: 2021-01-05

## 2020-09-25 ASSESSMENT — MIFFLIN-ST. JEOR: SCORE: 1624.69

## 2020-09-25 NOTE — TELEPHONE ENCOUNTER
Prior Authorization Retail Medication Request    Medication/Dose:  sildenafil (VIAGRA) 50 MG tablet  ICD code (if different than what is on RX):    Previously Tried and Failed:    Rationale:      Insurance Name:    Insurance ID:        Pharmacy Information (if different than what is on RX)  Name:    Phone:

## 2020-09-25 NOTE — TELEPHONE ENCOUNTER
Central Prior Authorization Team   [phone]    PA Initiation    Medication: sildenafil (VIAGRA) 50 MG tablet   Insurance Company: [employer] - Phone [phone] Fax [phone]  Pharmacy Filling the Rx: [id number] - [place], [place] - [address] [facility]  Filling Pharmacy Phone: [phone]  Filling Pharmacy Fax: [phone]  Start Date: 9/25/2020
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
## 2020-09-25 NOTE — PATIENT INSTRUCTIONS
Congratulations on your continued sobriety. I'm glad you're feeling better. I expect your joint pains and erectile dysfunction will get better over the next few months as your liver continues to recover. For now you can try occasional ibuprofen (no more than once a day) for joint pains. Try Viagra for the ED. Start with half a tab and increase as needed, up to 2 tabs. Come back in 3 months to check in, with labs first, and call anytime with questions.

## 2020-09-25 NOTE — TELEPHONE ENCOUNTER
[facility] Call Center    Phone Message    May a detailed message be left on voicemail: yes     Reason for Call: Other: Pt is trying to get medication but [employer] does not cover the medication sildenafil (VIAGRA) 50 MG tablet. Insurance is requesting a pre-authorization for the medication. Pt also has some questions for Dr. Piña, and would like a call back at: [phone]. Thanks!     Action Taken: Message routed to:  [facility] hep    Travel Screening: Not Applicable

## 2020-09-28 DIAGNOSIS — J45.909 ASTHMA: Primary | ICD-10-CM

## 2020-09-28 NOTE — TELEPHONE ENCOUNTER
PRIOR AUTHORIZATION DENIED    Medication: sildenafil (VIAGRA) 50 MG tablet-[place] DENIED     Denial Date: 9/28/2020    Denial Rational: Medication Exclusion from Patients benefit plan.         Appeal Information:

## 2020-09-29 NOTE — TELEPHONE ENCOUNTER
RECORDS RECEIVED FROM: internal/in process    DATE RECEIVED: 11.2.20    NOTES STATUS DETAILS   OFFICE NOTE from referring provider Internal  Self referred    OFFICE NOTE from other specialist na    DISCHARGE SUMMARY from hospital na    DISCHARGE REPORT from the ER na    MEDICATION LIST Internal     IMAGING  (NEED IMAGES AND REPORTS)     CT SCAN na    CHEST XRAY (CXR) CE/scsheduled  [facility]- 8.12.20   Scheduled 11.2.10    TESTS     PULMONARY FUNCTION TESTING (PFT) Scheduled Scheduled 11.2.10        Action 9.29.20 sv    Action Taken Message sent to CC to call pt and help schedule CXR  Image request sent to [facility] for   CXR- 8.12.20 --- received in PACS---

## 2020-10-13 ENCOUNTER — PRE VISIT (OUTPATIENT)
Dept: ONCOLOGY | Facility: CLINIC | Age: 50
End: 2020-10-13

## 2020-10-13 ENCOUNTER — VIRTUAL VISIT (OUTPATIENT)
Dept: ONCOLOGY | Facility: CLINIC | Age: 50
End: 2020-10-13
Attending: NURSE PRACTITIONER
Payer: COMMERCIAL

## 2020-10-13 VITALS — BODY MASS INDEX: 24.34 KG/M2 | WEIGHT: 170 LBS | HEIGHT: 70 IN

## 2020-10-13 DIAGNOSIS — K70.10 ALCOHOLIC HEPATITIS WITHOUT ASCITES (H): ICD-10-CM

## 2020-10-13 DIAGNOSIS — D64.9 ANEMIA, UNSPECIFIED TYPE: ICD-10-CM

## 2020-10-13 DIAGNOSIS — R79.89 ELEVATED PLATELET COUNT: Primary | ICD-10-CM

## 2020-10-13 PROCEDURE — 999N001193 HC VIDEO/TELEPHONE VISIT; NO CHARGE

## 2020-10-13 PROCEDURE — 99203 OFFICE O/P NEW LOW 30 MIN: CPT | Mod: GT | Performed by: INTERNAL MEDICINE

## 2020-10-13 ASSESSMENT — MIFFLIN-ST. JEOR: SCORE: 1647.36

## 2020-10-13 ASSESSMENT — PAIN SCALES - GENERAL: PAINLEVEL: MODERATE PAIN (4)

## 2020-10-13 NOTE — PROGRESS NOTES
Visit Date:   10/13/2020      HEMATOLOGY CONSULTATION      REQUESTING PROVIDER:  Sofi Webster CNP, for thrombocytosis.      Mr. Uribe is a 49-year-old gentleman with alcohol abuse.  The patient was admitted at [facility] in August.  The patient says that he quit alcohol on 08/06/2020.  He has had multiple labs done.  He has mild thrombocytosis and anemia.  Because of that, Hematology consult has been requested.  The patient denies any previous history of blood disorder.        Investigations including from from Care Everywhere reviewed and are summarized below:   1.  On 08/12/2020, normal WBC of 6.2, normal hemoglobin of 14.2 and normal platelets of 180.   -On 08/25/2020, platelets of 529.   -On 08/25/2020, folate of 13.7 and B12 of 1338.   -On 08/25/2020,Hepatitis B and HIV negative.   2. On 08/31/2020:  -Platelets of 528, hemoglobin of 12.3 and MCV of 110. Normal WBC.   -Elevated AST, ALT, and bilirubin of 10.5.   -Normal TSH.   3.  Multiple labs were done on 09/16/2020:   -WBC of 7.3, hemoglobin of 12.6 and platelets of 594.  MCV of 106.   -Normal electrolytes.   -LFT  better.   -Low ammonia.   4.  Multiple imaging studies were done in [facility]:   -CT abdomen and pelvis on 08/12/2020 revealed hepatomegaly with hepatic steatosis. Normal spleen.  -Chest x-ray on 08/12/2020 did not reveal any infection.   -CT lumbar spine on 08/12/2020 did not reveal any fracture or subluxation.   -Ultrasound liver on 08/24/2020 revealed hepatomegaly with hepatic steatosis.  No focal liver lesion.      The patient has quit alcohol.  The patient says that he is feeling better.      REVIEW OF SYSTEMS:  No headache.  No dizziness.  No chest pain.  No shortness of breath.  No abdominal pain, nausea or vomiting.  He has mild lower back pain.  Sometimes pain goes down the leg.  No urinary or bowel complaints.  No bleeding from any site.  No fever, chills or night sweats.  He recently lost some weight  which he attributes to recent hospitalization.  His appetite overall has been good. He does have asthma, which is controlled with medication.      All other review of systems negative.      ALLERGIES:  REVIEWED.      MEDICATIONS:  Reviewed.      PAST MEDICAL HISTORY:   1.  Alcohol abuse.   2.  Asthma.   3.  Alcoholic hepatitis.      SOCIAL HISTORY:   -No history of smoking.   -He started drinking when he was a teenager.  He has now quit.  He drank for almost 30 years.      PHYSICAL EXAMINATION:   GENERAL:  He is alert, oriented x3.  He is not in any distress. No cough. No labored breathing.   Rest of a comprehensive physical examination is deferred due to public [facility] emergency video visit restrictions.      LABORATORY DATA:  Reviewed.      ASSESSMENT:   1.  A 49-year-old gentleman with mild thrombocytosis.  This is likely reactive.   2.  Macrocytic anemia.   3.  Alcoholic hepatitis.   4.  Other medical problems including asthma.      RECOMMENDATION:   1.  I had a long discussion with the patient.  Labs were all reviewed.  He has mild thrombocytosis.  In the early 08/2020, his platelets were normal. Now it is mildly elevated.      Different causes of thrombocytosis discussed.  He likely has reactive thrombocytosis.  The patient lately has not been feeling well.  He was hospitalized.  He also has asthma.  The patient is also anemic.  Anemia can also cause thrombocytosis.  My suspicion for essential thrombocytosis is very low. I explained to the patient that generally with liver disease, platelets are low; in his case, they are slightly high.        Discussed regarding further workup.  As his thrombocytosis is mild, I would recommend simply monitoring it.  He is agreeable for it.  We will recheck another CBC in a month.  I am hoping his platelets are a little better.  If not, we will do workup for myeloproliferative neoplasm.     Complication of thrombocytosis discussed.  It can cause thrombosis, MI, stroke and other  thromboembolic disease.  The patient will fall into a low risk group as he never had any thrombosis and he is younger than 60.  The patient has been taking baby aspirin a day, which is fine.      2.  He has mild macrocytic anemia.  This is again from liver disease.  We will recheck some labs including CBC, iron studies, vitamin B12, and folate in a month.     3.  The patient advised not to resume alcohol.     4.  His LFTs are elevated, but improving.  We will recheck it in a month.     5.  The patient has some low back pain.  I told him lumbar spine CT did not reveal any fracture or any acute abnormality.  Hopefully, it will improve with time.     6. I will see him in a month.  He will call with any questions or concerns.      Thanks for the consult.         MARLENE ANGEL MD             D: 10/13/2020   T: 10/13/2020   MT: LALO      Name:     KATHARINE HODGSON   MRN:      [id number]        Account:      [id number]   :      1970           Visit Date:   10/13/2020      Document: [id number]       cc: Copy for Provider        Sofi Webster CNP     Video visit time of 30 minutes.

## 2020-10-13 NOTE — PROGRESS NOTES
Visit Date:   10/13/2020      HEMATOLOGY CONSULTATION      REQUESTING PROVIDER:  Sofi Webster CNP, for thrombocytosis.      Mr. Uribe is a 49-year-old gentleman with alcohol abuse.  The patient was admitted at [facility] in August.  The patient says that he quit alcohol on 08/06/2020.  He has had multiple labs done.  He has mild thrombocytosis and anemia.  Because of that, Hematology consult has been requested.  The patient denies any previous history of blood disorder.        Investigations including from from Care Everywhere reviewed and are summarized below:   1.  On 08/12/2020, normal WBC of 6.2, normal hemoglobin of 14.2 and normal platelets of 180.   -On 08/25/2020, platelets of 529.   -On 08/25/2020, folate of 13.7 and B12 of 1338.   -On 08/25/2020,Hepatitis B and HIV negative.   2. On 08/31/2020:  -Platelets of 528, hemoglobin of 12.3 and MCV of 110. Normal WBC.   -Elevated AST, ALT, and bilirubin of 10.5.   -Normal TSH.   3.  Multiple labs were done on 09/16/2020:   -WBC of 7.3, hemoglobin of 12.6 and platelets of 594.  MCV of 106.   -Normal electrolytes.   -LFT  better.   -Low ammonia.   4.  Multiple imaging studies were done in [facility]:   -CT abdomen and pelvis on 08/12/2020 revealed hepatomegaly with hepatic steatosis. Normal spleen.  -Chest x-ray on 08/12/2020 did not reveal any infection.   -CT lumbar spine on 08/12/2020 did not reveal any fracture or subluxation.   -Ultrasound liver on 08/24/2020 revealed hepatomegaly with hepatic steatosis.  No focal liver lesion.      The patient has quit alcohol.  The patient says that he is feeling better.      REVIEW OF SYSTEMS:  No headache.  No dizziness.  No chest pain.  No shortness of breath.  No abdominal pain, nausea or vomiting.  He has mild lower back pain.  Sometimes pain goes down the leg.  No urinary or bowel complaints.  No bleeding from any site.  No fever, chills or night sweats.  He recently lost some weight  which he attributes to recent hospitalization.  His appetite overall has been good. He does have asthma, which is controlled with medication.      All other review of systems negative.      ALLERGIES:  REVIEWED.      MEDICATIONS:  Reviewed.      PAST MEDICAL HISTORY:   1.  Alcohol abuse.   2.  Asthma.   3.  Alcoholic hepatitis.      SOCIAL HISTORY:   -No history of smoking.   -He started drinking when he was a teenager.  He has now quit.  He drank for almost 30 years.      PHYSICAL EXAMINATION:   GENERAL:  He is alert, oriented x3.  He is not in any distress. No cough. No labored breathing.   Rest of a comprehensive physical examination is deferred due to public [facility] emergency video visit restrictions.      LABORATORY DATA:  Reviewed.      ASSESSMENT:   1.  A 49-year-old gentleman with mild thrombocytosis.  This is likely reactive.   2.  Macrocytic anemia.   3.  Alcoholic hepatitis.   4.  Other medical problems including asthma.      RECOMMENDATION:   1.  I had a long discussion with the patient.  Labs were all reviewed.  He has mild thrombocytosis.  In the early 08/2020, his platelets were normal. Now it is mildly elevated.      Different causes of thrombocytosis discussed.  He likely has reactive thrombocytosis.  The patient lately has not been feeling well.  He was hospitalized.  He also has asthma.  The patient is also anemic.  Anemia can also cause thrombocytosis.  My suspicion for essential thrombocytosis is very low. I explained to the patient that generally with liver disease, platelets are low; in his case, they are slightly high.        Discussed regarding further workup.  As his thrombocytosis is mild, I would recommend simply monitoring it.  He is agreeable for it.  We will recheck another CBC in a month.  I am hoping his platelets are a little better.  If not, we will do workup for myeloproliferative neoplasm.     Complication of thrombocytosis discussed.  It can cause thrombosis, MI, stroke and other  thromboembolic disease.  The patient will fall into a low risk group as he never had any thrombosis and he is younger than 60.  The patient has been taking baby aspirin a day, which is fine.      2.  He has mild macrocytic anemia.  This is again from liver disease.  We will recheck some labs including CBC, iron studies, vitamin B12, and folate in a month.     3.  The patient advised not to resume alcohol.     4.  His LFTs are elevated, but improving.  We will recheck it in a month.     5.  The patient has some low back pain.  I told him lumbar spine CT did not reveal any fracture or any acute abnormality.  Hopefully, it will improve with time.     6. I will see him in a month.  He will call with any questions or concerns.      Thanks for the consult.         MARLENE ANGEL MD             D: 10/13/2020   T: 10/13/2020   MT: LALO      Name:     KATHARINE HODGSON   MRN:      [id number]        Account:      [id number]   :      1970           Visit Date:   10/13/2020      Document: [id number]       cc: Copy for Provider        Sofi Webster CNP     Video visit time of 30 minutes.

## 2020-10-13 NOTE — PROGRESS NOTES
"Ming Uribe is a 49 year old male who is being evaluated via a billable video visit.      The patient has been notified of following:     \"This video visit will be conducted via a call between you and your physician/provider. We have found that certain health care needs can be provided without the need for an in-person physical exam.  This service lets us provide the care you need with a video conversation.  If a prescription is necessary we can send it directly to your pharmacy.  If lab work is needed we can place an order for that and you can then stop by our lab to have the test done at a later time.    Video visits are billed at different rates depending on your insurance coverage.  Please reach out to your insurance provider with any questions.    If during the course of the call the physician/provider feels a video visit is not appropriate, you will not be charged for this service.\"    Patient has given verbal consent for Video visit? Yes  How would you like to obtain your AVS? MyChart  If you are dropped from the video visit, the video invite should be resent to: Text to cell phone: [phone]  Will anyone else be joining your video visit? No        Video-Visit Details    Type of service:  Video Visit    Video Start Time:   Video End Time:     Originating Location (pt. Location): Home    Distant Location (provider location):  [facility]     Platform used for Video Visit: Doximity    Patient reports muscle aches, foot pain & tingling toes (neuropathy) & low back pain.    Claire Mac CMA  10/13/2020      1:00 PM        "

## 2020-10-13 NOTE — Clinical Note
"    10/13/2020         RE: Ming Uribe  [address]  [address]  [place] [address]        Dear Colleague,    Thank you for referring your patient, Ming Uribe, to the [facility]. Please see a copy of my visit note below.    Ming Uribe is a 49 year old male who is being evaluated via a billable video visit.      The patient has been notified of following:     \"This video visit will be conducted via a call between you and your physician/provider. We have found that certain health care needs can be provided without the need for an in-person physical exam.  This service lets us provide the care you need with a video conversation.  If a prescription is necessary we can send it directly to your pharmacy.  If lab work is needed we can place an order for that and you can then stop by our lab to have the test done at a later time.    Video visits are billed at different rates depending on your insurance coverage.  Please reach out to your insurance provider with any questions.    If during the course of the call the physician/provider feels a video visit is not appropriate, you will not be charged for this service.\"    Patient has given verbal consent for Video visit? Yes  How would you like to obtain your AVS? MyChart  If you are dropped from the video visit, the video invite should be resent to: Text to cell phone: [phone]  Will anyone else be joining your video visit? No  {If patient encounters technical issues they should call [phone] :[id number]}      Video-Visit Details    Type of service:  Video Visit    Video Start Time:   Video End Time:     Originating Location (pt. Location): Home    Distant Location (provider location):  [facility]     Platform used for Video Visit: Doximity    Patient reports muscle aches, foot pain & tingling toes (neuropathy) & low back pain.    Claire Mac CMA  10/13/2020      1:00 PM          Visit Date: "   10/13/2020      HEMATOLOGY CONSULTATION      REQUESTING PROVIDER:  Sofi Webster CNP, for thrombocytosis.      Mr. Uribe is a 49-year-old gentleman with alcohol abuse.  The patient was admitted at [facility] in August.  The patient says that he quit alcohol on 08/06/2020.  He has had multiple labs done.  He has mild thrombocytosis and anemia.  Because of that, Hematology consult has been requested.  The patient denies any previous history of blood disorder.        Investigations including from from Care Everywhere reviewed and are summarized below:   1.  On 08/12/2020, normal WBC of 6.2, normal hemoglobin of 14.2 and normal platelets of 180.   -On 08/25/2020, platelets of 529.   -On 08/25/2020, folate of 13.7 and B12 of 1338.   -On 08/25/2020,Hepatitis B and HIV negative.   2. On 08/31/2020:  -Platelets of 528, hemoglobin of 12.3 and MCV of 110. Normal WBC.   -Elevated AST, ALT, and bilirubin of 10.5.   -Normal TSH.   3.  Multiple labs were done on 09/16/2020:   -WBC of 7.3, hemoglobin of 12.6 and platelets of 594.  MCV of 106.   -Normal electrolytes.   -LFT  better.   -Low ammonia.   4.  Multiple imaging studies were done in [facility]:   -CT abdomen and pelvis on 08/12/2020 revealed hepatomegaly with hepatic steatosis. Normal spleen.  -Chest x-ray on 08/12/2020 did not reveal any infection.   -CT lumbar spine on 08/12/2020 did not reveal any fracture or subluxation.   -Ultrasound liver on 08/24/2020 revealed hepatomegaly with hepatic steatosis.  No focal liver lesion.      The patient has quit alcohol.  The patient says that he is feeling better.      REVIEW OF SYSTEMS:  No headache.  No dizziness.  No chest pain.  No shortness of breath.  No abdominal pain, nausea or vomiting.  He has mild lower back pain.  Sometimes pain goes down the leg.  No urinary or bowel complaints.  No bleeding from any site.  No fever, chills or night sweats.  He recently lost some weight which he  attributes to recent hospitalization.  His appetite overall has been good. He does have asthma, which is controlled with medication.      All other review of systems negative.      ALLERGIES:  REVIEWED.      MEDICATIONS:  Reviewed.      PAST MEDICAL HISTORY:   1.  Alcohol abuse.   2.  Asthma.   3.  Alcoholic hepatitis.      SOCIAL HISTORY:   -No history of smoking.   -He started drinking when he was a teenager.  He has now quit.  He drank for almost 30 years.      PHYSICAL EXAMINATION:   GENERAL:  He is alert, oriented x3.  He is not in any distress. No cough. No labored breathing.   Rest of a comprehensive physical examination is deferred due to public [facility] emergency video visit restrictions.      LABORATORY DATA:  Reviewed.      ASSESSMENT:   1.  A 49-year-old gentleman with mild thrombocytosis.  This is likely reactive.   2.  Macrocytic anemia.   3.  Alcoholic hepatitis.   4.  Other medical problems including asthma.      RECOMMENDATION:   1.  I had a long discussion with the patient.  Labs were all reviewed.  He has mild thrombocytosis.  In the early 08/2020, his platelets were normal. Now it is mildly elevated.      Different causes of thrombocytosis discussed.  He likely has reactive thrombocytosis.  The patient lately has not been feeling well.  He was hospitalized.  He also has asthma.  The patient is also anemic.  Anemia can also cause thrombocytosis.  My suspicion for essential thrombocytosis is very low. I explained to the patient that generally with liver disease, platelets are low; in his case, they are slightly high.        Discussed regarding further workup.  As his thrombocytosis is mild, I would recommend simply monitoring it.  He is agreeable for it.  We will recheck another CBC in a month.  I am hoping his platelets are a little better.  If not, we will do workup for myeloproliferative neoplasm.     Complication of thrombocytosis discussed.  It can cause thrombosis, MI, stroke and other  thromboembolic disease.  The patient will fall into a low risk group as he never had any thrombosis and he is younger than 60.  The patient has been taking baby aspirin a day, which is fine.      2.  He has mild macrocytic anemia.  This is again from liver disease.  We will recheck some labs including CBC, iron studies, vitamin B12, and folate in a month.     3.  The patient advised not to resume alcohol.     4.  His LFTs are elevated, but improving.  We will recheck it in a month.     5.  The patient has some low back pain.  I told him lumbar spine CT did not reveal any fracture or any acute abnormality.  Hopefully, it will improve with time.     6. I will see him in a month.  He will call with any questions or concerns.      Thanks for the consult.         MARLENE ANGEL MD             D: 10/13/2020   T: 10/13/2020   MT: LALO      Name:     KATHARINE HODGSON   MRN:      [id number]        Account:      [id number]   :      1970           Visit Date:   10/13/2020      Document: [id number]       cc: Copy for Provider        Sofi Webster CNP     Video visit time of 30 minutes.    Visit Date:   10/13/2020      HEMATOLOGY CONSULTATION      REQUESTING PROVIDER:  Sofi Webster CNP, for thrombocytosis.      SUBJECTIVE:  Mr. Hodgson is a 49-year-old gentleman with alcohol abuse.  The patient was admitted at [facility] in August.  The patient says that he quit alcohol on 2020.  He has had multiple labs done.  He has mild thrombocytosis and anemia.  Because of that, Hematology consult has been requested.  The patient denies any previous history of blood disorder.        His labs from Care Everywhere reviewed and are summarized below:   1.  On 2020, normal WBC of 6.2, normal hemoglobin of 14.2 and normal platelets of 180.   -- On 2020, platelets of 529.   -- On 2020, platelets of 528, hemoglobin of 12.3 and MCV of 110, normal WBC.   -- Elevated AST, ALT, and bilirubin of 10.5.    -- Normal TSH.   2.  Multiple labs were done on 09/16/2020:   -- WBC of 7.3, hemoglobin 12.6 and platelets of 594.  MCV of 106.   -- Normal electrolytes.   -- Elevated AST, ALT and bilirubin, but that has improved.  Bilirubin is down to 2.4.   -- Low ammonia.   3.  Multiple labs were done in [facility]:   -- On 08/25/2020, folate of 13.7 and B12 of 1338.   -- Hepatitis and HIV negative.   4.  Multiple imaging studies were done in [facility]:   -- CT abdomen and pelvis on 08/12/2020 revealed hepatomegaly with CBC, hepatic steatosis.   -- Chest x-ray on 08/12/2020 did not reveal any infection.   -- CT lumbar spine on 08/12/2020 did not reveal any fracture or subluxation.   -- Ultrasound liver on 08/24/2020 revealed hepatomegaly with hepatic steatosis.  No focal liver lesion.      The patient has quit alcohol.  The patient says that he is feeling better.      REVIEW OF SYSTEMS:  No headache.  No dizziness.  No chest pain.  No shortness of breath.  No abdominal pain, nausea or vomiting.  He has mild lower back pain.  Sometimes pain goes down the leg.  No urinary or bowel complaints.  No bleeding from any site.  No fever, chills or night sweats.  He recently lost some weight because of his hospitalization.  His appetite overall has been good.      He does have asthma, which is controlled with medication.      All other review of systems negative.      ALLERGIES:  REVIEWED.      MEDICATIONS:  Reviewed.      PAST MEDICAL HISTORY:   1.  Alcohol abuse.   2.  Asthma.   3.  Alcoholic hepatitis.      SOCIAL HISTORY:   -- No history of smoking.   -- He started drinking when he was a teenager.  He has now quit.  He drank for almost 30 years.      PHYSICAL EXAMINATION:   GENERAL:  He is alert, oriented x3.  He is not in any distress.   RESPIRATORY:  No labored breathing.   The rest of a comprehensive physical examination is deferred due to public health emergency video visit restrictions.       LABORATORY DATA:  Reviewed.      ASSESSMENT:   1.  A 49-year-old gentleman with mild thrombocytosis.  This is likely reactive.   2.  Macrocytic anemia.   3.  Alcoholic hepatitis.   4.  Other medical problems including asthma.      PLAN:   1.  I had a long discussion with the patient.  Labs were all reviewed.  He has mild thrombocytosis.  In the early part of 08/2020, his platelets were normal, now mildly elevated.      Different causes of thrombocytosis discussed.  He likely has reactive thrombocytosis.  The patient lately has not been feeling well.  He was hospitalized.  He also has asthma.  The patient is also anemic.  Anemia can also cause thrombocytosis.  My suspicion for essential thrombocytosis is very low.      I explained to the patient that generally with liver disease, platelets are low; in his case, they are slightly high.        Discussed regarding further workup.  As his thrombocytosis is mild, I would recommend simply monitoring it.  He is agreeable for it.  We will recheck another CBC in a month.  I am hoping his platelets are a little better.  If not, we will do workup for myeloproliferative neoplasm.      2.  He has mild macrocytic anemia.  This is again from liver disease.  We will recheck some labs including CBC, iron studies, vitamin B12, and folate in a month.   3.  The patient advised not to resume alcohol.   4.  His LFTs are elevated, but improving.  We will recheck it in a month.   5.  The patient has some low back pain.  I told him lumbar spine CT did not reveal any fracture or any acute abnormality.  Hopefully, it will improve with time.   6.  Complication of thrombocytosis discussed.  It can cause thrombosis, MI, stroke and other thromboembolic disease.  The patient will fall into a low risk group as he never had any thrombosis and he is younger than 60.  The patient has been taking baby aspirin a day, which is fine.   7.  I will see him in a month.  He will call with any questions or  concerns.      Thanks for the consult.         MARLENE ANGEL MD             D: 10/13/2020   T: 10/13/2020   MT: PK      Name:     KATHARINE HODGSON   MRN:      [id number]        Account:      [id number]   :      1970           Visit Date:   10/13/2020      Document: [id number]       cc: Copy for Provider        Sofi Webster CNP       Again, thank you for allowing me to participate in the care of your patient.        Sincerely,        Marlene Angel MD

## 2020-10-13 NOTE — Clinical Note
"    10/13/2020         RE: Ming Uribe  [address]  [address]  [facility] [address]        Dear Colleague,    Thank you for referring your patient, Ming Uribe, to the [facility]. Please see a copy of my visit note below.    Ming Uribe is a 49 year old male who is being evaluated via a billable video visit.      The patient has been notified of following:     \"This video visit will be conducted via a call between you and your physician/provider. We have found that certain health care needs can be provided without the need for an in-person physical exam.  This service lets us provide the care you need with a video conversation.  If a prescription is necessary we can send it directly to your pharmacy.  If lab work is needed we can place an order for that and you can then stop by our lab to have the test done at a later time.    Video visits are billed at different rates depending on your insurance coverage.  Please reach out to your insurance provider with any questions.    If during the course of the call the physician/provider feels a video visit is not appropriate, you will not be charged for this service.\"    Patient has given verbal consent for Video visit? Yes  How would you like to obtain your AVS? MyChart  If you are dropped from the video visit, the video invite should be resent to: Text to cell phone: [phone]  Will anyone else be joining your video visit? No  {If patient encounters technical issues they should call [phone] :[id number]}      Video-Visit Details    Type of service:  Video Visit    Video Start Time:   Video End Time:     Originating Location (pt. Location): Home    Distant Location (provider location):  [facility]     Platform used for Video Visit: Doximity    Patient reports muscle aches, foot pain & tingling toes (neuropathy) & low back pain.    Claire Mac CMA  10/13/2020      1:00 PM          Visit Date: "   10/13/2020      HEMATOLOGY CONSULTATION      REQUESTING PROVIDER:  Sofi Webster CNP, for thrombocytosis.      Mr. Uribe is a 49-year-old gentleman with alcohol abuse.  The patient was admitted at [facility] in August.  The patient says that he quit alcohol on 08/06/2020.  He has had multiple labs done.  He has mild thrombocytosis and anemia.  Because of that, Hematology consult has been requested.  The patient denies any previous history of blood disorder.        Investigations including from from Care Everywhere reviewed and are summarized below:   1.  On 08/12/2020, normal WBC of 6.2, normal hemoglobin of 14.2 and normal platelets of 180.   -On 08/25/2020, platelets of 529.   -On 08/25/2020, folate of 13.7 and B12 of 1338.   -On 08/25/2020,Hepatitis B and HIV negative.   2. On 08/31/2020:  -Platelets of 528, hemoglobin of 12.3 and MCV of 110. Normal WBC.   -Elevated AST, ALT, and bilirubin of 10.5.   -Normal TSH.   3.  Multiple labs were done on 09/16/2020:   -WBC of 7.3, hemoglobin of 12.6 and platelets of 594.  MCV of 106.   -Normal electrolytes.   -LFT  better.   -Low ammonia.   4.  Multiple imaging studies were done in [facility]:   -CT abdomen and pelvis on 08/12/2020 revealed hepatomegaly with hepatic steatosis. Normal spleen.  -Chest x-ray on 08/12/2020 did not reveal any infection.   -CT lumbar spine on 08/12/2020 did not reveal any fracture or subluxation.   -Ultrasound liver on 08/24/2020 revealed hepatomegaly with hepatic steatosis.  No focal liver lesion.      The patient has quit alcohol.  The patient says that he is feeling better.      REVIEW OF SYSTEMS:  No headache.  No dizziness.  No chest pain.  No shortness of breath.  No abdominal pain, nausea or vomiting.  He has mild lower back pain.  Sometimes pain goes down the leg.  No urinary or bowel complaints.  No bleeding from any site.  No fever, chills or night sweats.  He recently lost some weight which he  attributes to recent hospitalization.  His appetite overall has been good. He does have asthma, which is controlled with medication.      All other review of systems negative.      ALLERGIES:  REVIEWED.      MEDICATIONS:  Reviewed.      PAST MEDICAL HISTORY:   1.  Alcohol abuse.   2.  Asthma.   3.  Alcoholic hepatitis.      SOCIAL HISTORY:   -No history of smoking.   -He started drinking when he was a teenager.  He has now quit.  He drank for almost 30 years.      PHYSICAL EXAMINATION:   GENERAL:  He is alert, oriented x3.  He is not in any distress. No cough. No labored breathing.   Rest of a comprehensive physical examination is deferred due to public [facility] emergency video visit restrictions.      LABORATORY DATA:  Reviewed.      ASSESSMENT:   1.  A 49-year-old gentleman with mild thrombocytosis.  This is likely reactive.   2.  Macrocytic anemia.   3.  Alcoholic hepatitis.   4.  Other medical problems including asthma.      RECOMMENDATION:   1.  I had a long discussion with the patient.  Labs were all reviewed.  He has mild thrombocytosis.  In the early 08/2020, his platelets were normal. Now it is mildly elevated.      Different causes of thrombocytosis discussed.  He likely has reactive thrombocytosis.  The patient lately has not been feeling well.  He was hospitalized.  He also has asthma.  The patient is also anemic.  Anemia can also cause thrombocytosis.  My suspicion for essential thrombocytosis is very low. I explained to the patient that generally with liver disease, platelets are low; in his case, they are slightly high.        Discussed regarding further workup.  As his thrombocytosis is mild, I would recommend simply monitoring it.  He is agreeable for it.  We will recheck another CBC in a month.  I am hoping his platelets are a little better.  If not, we will do workup for myeloproliferative neoplasm.     Complication of thrombocytosis discussed.  It can cause thrombosis, MI, stroke and other  thromboembolic disease.  The patient will fall into a low risk group as he never had any thrombosis and he is younger than 60.  The patient has been taking baby aspirin a day, which is fine.      2.  He has mild macrocytic anemia.  This is again from liver disease.  We will recheck some labs including CBC, iron studies, vitamin B12, and folate in a month.     3.  The patient advised not to resume alcohol.     4.  His LFTs are elevated, but improving.  We will recheck it in a month.     5.  The patient has some low back pain.  I told him lumbar spine CT did not reveal any fracture or any acute abnormality.  Hopefully, it will improve with time.     6. I will see him in a month.  He will call with any questions or concerns.      Thanks for the consult.         MARLENE ANGEL MD             D: 10/13/2020   T: 10/13/2020   MT: LALO      Name:     KATHARINE HODGSON   MRN:      [id number]        Account:      [id number]   :      1970           Visit Date:   10/13/2020      Document: [id number]       cc: Copy for Provider        Sofi Webster CNP     Video visit time of 30 minutes.    Visit Date:   10/13/2020      HEMATOLOGY CONSULTATION      REQUESTING PROVIDER:  Sofi Webster CNP, for thrombocytosis.      SUBJECTIVE:  Mr. Hodgson is a 49-year-old gentleman with alcohol abuse.  The patient was admitted at [facility] in August.  The patient says that he quit alcohol on 2020.  He has had multiple labs done.  He has mild thrombocytosis and anemia.  Because of that, Hematology consult has been requested.  The patient denies any previous history of blood disorder.        His labs from Care Everywhere reviewed and are summarized below:   1.  On 2020, normal WBC of 6.2, normal hemoglobin of 14.2 and normal platelets of 180.   -- On 2020, platelets of 529.   -- On 2020, platelets of 528, hemoglobin of 12.3 and MCV of 110, normal WBC.   -- Elevated AST, ALT, and bilirubin of 10.5.    -- Normal TSH.   2.  Multiple labs were done on 09/16/2020:   -- WBC of 7.3, hemoglobin 12.6 and platelets of 594.  MCV of 106.   -- Normal electrolytes.   -- Elevated AST, ALT and bilirubin, but that has improved.  Bilirubin is down to 2.4.   -- Low ammonia.   3.  Multiple labs were done in [facility]:   -- On 08/25/2020, folate of 13.7 and B12 of 1338.   -- Hepatitis and HIV negative.   4.  Multiple imaging studies were done in [facility]:   -- CT abdomen and pelvis on 08/12/2020 revealed hepatomegaly with CBC, hepatic steatosis.   -- Chest x-ray on 08/12/2020 did not reveal any infection.   -- CT lumbar spine on 08/12/2020 did not reveal any fracture or subluxation.   -- Ultrasound liver on 08/24/2020 revealed hepatomegaly with hepatic steatosis.  No focal liver lesion.      The patient has quit alcohol.  The patient says that he is feeling better.      REVIEW OF SYSTEMS:  No headache.  No dizziness.  No chest pain.  No shortness of breath.  No abdominal pain, nausea or vomiting.  He has mild lower back pain.  Sometimes pain goes down the leg.  No urinary or bowel complaints.  No bleeding from any site.  No fever, chills or night sweats.  He recently lost some weight because of his hospitalization.  His appetite overall has been good.      He does have asthma, which is controlled with medication.      All other review of systems negative.      ALLERGIES:  REVIEWED.      MEDICATIONS:  Reviewed.      PAST MEDICAL HISTORY:   1.  Alcohol abuse.   2.  Asthma.   3.  Alcoholic hepatitis.      SOCIAL HISTORY:   -- No history of smoking.   -- He started drinking when he was a teenager.  He has now quit.  He drank for almost 30 years.      PHYSICAL EXAMINATION:   GENERAL:  He is alert, oriented x3.  He is not in any distress.   RESPIRATORY:  No labored breathing.   The rest of a comprehensive physical examination is deferred due to public health emergency video visit restrictions.       LABORATORY DATA:  Reviewed.      ASSESSMENT:   1.  A 49-year-old gentleman with mild thrombocytosis.  This is likely reactive.   2.  Macrocytic anemia.   3.  Alcoholic hepatitis.   4.  Other medical problems including asthma.      PLAN:   1.  I had a long discussion with the patient.  Labs were all reviewed.  He has mild thrombocytosis.  In the early part of 08/2020, his platelets were normal, now mildly elevated.      Different causes of thrombocytosis discussed.  He likely has reactive thrombocytosis.  The patient lately has not been feeling well.  He was hospitalized.  He also has asthma.  The patient is also anemic.  Anemia can also cause thrombocytosis.  My suspicion for essential thrombocytosis is very low.      I explained to the patient that generally with liver disease, platelets are low; in his case, they are slightly high.        Discussed regarding further workup.  As his thrombocytosis is mild, I would recommend simply monitoring it.  He is agreeable for it.  We will recheck another CBC in a month.  I am hoping his platelets are a little better.  If not, we will do workup for myeloproliferative neoplasm.      2.  He has mild macrocytic anemia.  This is again from liver disease.  We will recheck some labs including CBC, iron studies, vitamin B12, and folate in a month.   3.  The patient advised not to resume alcohol.   4.  His LFTs are elevated, but improving.  We will recheck it in a month.   5.  The patient has some low back pain.  I told him lumbar spine CT did not reveal any fracture or any acute abnormality.  Hopefully, it will improve with time.   6.  Complication of thrombocytosis discussed.  It can cause thrombosis, MI, stroke and other thromboembolic disease.  The patient will fall into a low risk group as he never had any thrombosis and he is younger than 60.  The patient has been taking baby aspirin a day, which is fine.   7.  I will see him in a month.  He will call with any questions or  concerns.      Thanks for the consult.         MARLENE ANGEL MD             D: 10/13/2020   T: 10/13/2020   MT: PK      Name:     KATHARINE HODGSON   MRN:      [id number]        Account:      [id number]   :      1970           Visit Date:   10/13/2020      Document: [id number]       cc: Copy for Provider        Sofi Webster CNP       Again, thank you for allowing me to participate in the care of your patient.        Sincerely,        Marlene Angel MD

## 2020-10-22 ENCOUNTER — TELEPHONE (OUTPATIENT)
Dept: FAMILY MEDICINE | Facility: CLINIC | Age: 50
End: 2020-10-22

## 2020-10-22 NOTE — TELEPHONE ENCOUNTER
Pt establish care with Sofi Webster NP on 08/2020.  Next med refill needed will be Sofi as she is pt new primary care provider.

## 2020-10-22 NOTE — TELEPHONE ENCOUNTER
[facility] Call Center    Phone Message    May a detailed message be left on voicemail: yes     Reason for Call: Medication Question or concern regarding medication   Prescription Clarification  Name of Medication: SYNTHROID 150 MCG and ADVAIR HSA, 115/21  Prescribing Provider: N/A   Pharmacy: [facility] [id number] [place], [place] - [facility]   What on the order needs clarification? Patient states that he has a couple months supply on these medication, but the pharmacy states that these medications do not list Sofi Webster. NP as the prescribing provider. Patient wants the clinic to make sure the medications are under Sofi's name. Please contact patient to confirm and advise.      Action Taken: Message routed to:  [facility] PCC    Travel Screening: Not Applicable

## 2020-10-27 ENCOUNTER — TELEPHONE (OUTPATIENT)
Dept: FAMILY MEDICINE | Facility: CLINIC | Age: 50
End: 2020-10-27

## 2020-10-27 NOTE — TELEPHONE ENCOUNTER
MENDOZAM to schedule a virtual appointment with Sofi Webster for medication refill.    ZOYA Ceja 1:55 PM  10/27/2020

## 2020-11-02 ENCOUNTER — PRE VISIT (OUTPATIENT)
Dept: PULMONOLOGY | Facility: CLINIC | Age: 50
End: 2020-11-02

## 2020-12-08 DIAGNOSIS — K70.10 ALCOHOLIC HEPATITIS WITHOUT ASCITES (H): Primary | ICD-10-CM

## 2020-12-10 ENCOUNTER — VIRTUAL VISIT (OUTPATIENT)
Dept: FAMILY MEDICINE | Facility: OTHER | Age: 50
End: 2020-12-10

## 2020-12-10 DIAGNOSIS — Z20.822 SUSPECTED 2019 NOVEL CORONAVIRUS INFECTION: ICD-10-CM

## 2020-12-10 DIAGNOSIS — Z20.822 SUSPECTED 2019 NOVEL CORONAVIRUS INFECTION: Primary | ICD-10-CM

## 2020-12-10 PROCEDURE — U0003 INFECTIOUS AGENT DETECTION BY NUCLEIC ACID (DNA OR RNA); SEVERE ACUTE RESPIRATORY SYNDROME CORONAVIRUS 2 (SARS-COV-2) (CORONAVIRUS DISEASE [COVID-19]), AMPLIFIED PROBE TECHNIQUE, MAKING USE OF HIGH THROUGHPUT TECHNOLOGIES AS DESCRIBED BY CMS-2020-01-R: HCPCS | Performed by: FAMILY MEDICINE

## 2020-12-10 NOTE — PROGRESS NOTES
"Date: 12/10/2020 13:03:59  Clinician: Elder Sheldon  Clinician NPI: [id number]  Patient: Ming Uribe  Patient : 1970  Patient Address: [address] [place] [address]  Patient Phone: [phone]  Visit Protocol: URI  Patient Summary:  Ming is a 49 year old ( : 1970 ) male who initiated a [facility] Visit for COVID-19 (Coronavirus) evaluation and screening. When asked the question \"Please sign me up to receive news, health information and promotions. \", Ming responded \"No\".    Ming states his symptoms started 1-2 days ago.   His symptoms consist of ear pain, a headache, enlarged lymph nodes, a cough, nasal congestion, nausea, rhinitis, facial pain or pressure, myalgia, chills, malaise, a sore throat, ageusia, and anosmia. He is experiencing mild difficulty breathing with activities but can speak normally in full sentences. Ming also feels feverish but was unable to measure his temperature.   Symptom details     Nasal secretions: The color of his mucus is clear.    Cough: Ming coughs a few times an hour and his cough is more bothersome at night. Phlegm comes into his throat when he coughs. He believes his cough is caused by post-nasal drip. The color of the phlegm is green.     Sore throat: Ming reports having mild throat pain (1-3 on a 10 point pain scale), has exudate on his tonsils, and can swallow liquids. The lymph nodes in his neck are enlarged. A rash has not appeared on the skin since the sore throat started.     Facial pain or pressure: The facial pain or pressure does not feel worse when bending or leaning forward.     Headache: He states the headache is mild (1-3 on a 10 point pain scale).      Ming denies having wheezing, vomiting, teeth pain, and diarrhea. He also denies taking antibiotic medication in the past month, having recent facial or sinus surgery in the past 60 days, and having a sinus infection within the past year.   Precipitating events  Within " the past week, Ming has not been exposed to someone with strep throat. He has not recently been exposed to someone with influenza. Ming has not been in close contact with any high risk individuals.   Pertinent COVID-19 (Coronavirus) information  Ming does not work or volunteer as healthcare worker or a . In the past 14 days, Ming has not worked or volunteered at a healthcare facility or group living setting.   In the past 14 days, he also has not lived in a congregate living setting.   Ming has not had a close contact with a laboratory-confirmed COVID-19 patient within 14 days of symptom onset.    Since December 2019, Ming has been tested for COVID-19 and has not had upper respiratory infection or influenza-like illness.      Result of COVID-19 test: Negative     Date of his COVID-19 test: 11/07/2020      Pertinent medical history  Ming has asthma. He uses quick-relief inhaler more than two times per week. He refills his quick-relief inhaler more than two times per year. He wakes up at night with asthma symptoms more than two times per month.   He has not been told by his provider to avoid NSAIDs.   Ming does not have diabetes. He denies having immunosuppressive conditions (e.g., chemotherapy, HIV, organ transplant, long-term use of steroids or other immunosuppressive medications, splenectomy). He does not have severe COPD and congestive heart failure.   Ming does not need a return to work/school note.   Weight: 175 lbs   Ming does not smoke or use smokeless tobacco.   Weight: 175 lbs    MEDICATIONS: Spiriva Respimat inhalation, lisinopril oral, Synthroid oral, Ventolin HFA inhalation, montelukast oral, Advair HFA inhalation, ALLERGIES: NKDA  Clinician Response:  Dear Ming,   Your symptoms show that you may have coronavirus (COVID-19). This illness can cause fever, cough and trouble breathing. Many people get a mild case and get better on their own. Some people can get  "very sick.  What should I do?  We would like to test you for this virus.   1. Please call [phone] to schedule your visit. Explain that you were referred by [facility] to have a COVID-19 test. Be [facility].  * If you need to schedule in [facility] please call [phone] or for [employer] employees please call [phone].  * If you need to schedule in the [place] area please call [phone]. [place] employees call [phone].  The following will serve as your written order for this COVID Test, ordered by me, for the indication of suspected COVID [Z20.828]: The test will be ordered in [employer], our electronic health record, after you are scheduled. It will show as ordered and authorized by Jaguar Granger MD.  Order: COVID-19 (Coronavirus) PCR for SYMPTOMATIC testing from [facility].   2. When it's time for your COVID test:  Stay at least 6 feet away from others. (If someone will drive you to your test, stay in the backseat, as far away from the  as you can.)   Cover your mouth and nose with a mask, tissue or washcloth.  Go straight to the testing site. Don't make any stops on the way there or back.      3.Starting now: Stay home and away from others (self-isolate) until:   You've had no fever---and no medicine that reduces fever---for one full day (24 hours). And...   Your other symptoms have gotten better. For example, your cough or breathing has improved. And...   At least 10 days have passed since your symptoms started.       During this time, don't leave the house except for testing or medical care.   Stay in your own room, even for meals. Use your own bathroom if you can.   Stay away from others in your home. No hugging, kissing or shaking hands. No visitors.  Don't go to work, school or anywhere else.    Clean \"high touch\" surfaces often (doorknobs, counters, handles, etc.). Use a household cleaning spray or wipes. You'll find a full list of  on the EPA website: " www.epa.gov/pesticide-registration/list-n-disinfectants-use-against-sars-cov-2.   Cover your mouth and nose with a mask, tissue or washcloth to avoid spreading germs.  Wash your hands and face often. Use soap and water.  Caregivers in these groups are at risk for severe illness due to COVID-19:  o People 65 years and older  o People who live in a nursing home or long-term care facility  o People with chronic disease (lung, heart, cancer, diabetes, kidney, liver, immunologic)  o People who have a weakened immune system, including those who:   Are in cancer treatment  Take medicine that weakens the immune system, such as corticosteroids  Had a bone marrow or organ transplant  Have an immune deficiency  Have poorly controlled HIV or AIDS  Are obese (body mass index of 40 or higher)  Smoke regularly   o Caregivers should wear gloves while washing dishes, handling laundry and cleaning bedrooms and bathrooms.  o Use caution when washing and drying laundry: Don't shake dirty laundry, and use the warmest water setting that you can.  o For more tips, go to www.cdc.gov/coronavirus/2019-ncov/downloads/10Things.pdf.    4.Sign up for [employer]. We know it's scary to hear that you might have COVID-19. We want to track your symptoms to make sure you're okay over the next 2 weeks. Please look for an email from [employer]---this is a free, online program that we'll use to keep in touch. To sign up, follow the link in the email. Learn more at [email or web address]  How can I take care of myself?   Get lots of rest. Drink extra fluids (unless a doctor has told you not to).   Take Tylenol (acetaminophen) for fever or pain. If you have liver or kidney problems, ask your family doctor if it's okay to take Tylenol.   Adults can take either:    650 mg (two 325 mg pills) every 4 to 6 hours, or...   1,000 mg (two 500 mg pills) every 8 hours as needed.    Note: Don't take more than 3,000 mg in one day. Acetaminophen is found  in many medicines (both prescribed and over-the-counter medicines). Read all labels to be sure you don't take too much.   For children, check the Tylenol bottle for the right dose. The dose is based on the child's age or weight.    If you have other health problems (like cancer, heart failure, an organ transplant or severe kidney disease): Call your specialty clinic if you don't feel better in the next 2 days.       Know when to call 911. Emergency warning signs include:    Trouble breathing or shortness of breath Pain or pressure in the chest that doesn't go away Feeling confused like you haven't felt before, or not being able to wake up Bluish-colored lips or face.  Where can I get more information?    [facility] -- About COVID-19: [email or web address]   CDC -- What to Do If You're Sick: www.cdc.gov/coronavirus/2019-ncov/about/steps-when-sick.html   [facility] -- Ending Home Isolation: www.cdc.gov/coronavirus/2019-ncov/hcp/disposition-in-home-patients.html   CDC -- Caring for Someone: www.cdc.gov/coronavirus/2019-ncov/if-you-are-sick/care-for-someone.html   [facility] -- Interim Guidance for Hospital Discharge to Home: www.health.[facility].[place]/diseases/coronavirus/hcp/hospdischarge.pdf   [facility] clinical trials (COVID-19 research studies): clinicalaffairs.[facility]-clinical-trials    Below are the COVID-19 hotlines at the [facility] ([facility]). Interpreters are available.    For health questions: Call 171-945-2993 or 1-505.575.7726 (7 a.m. to 7 p.m.) For questions about schools and childcare: Call 496-823-1937 or 1-456.981.1388 (7 a.m. to 7 p.m.)    Diagnosis: Contact with and (suspected) exposure to other viral communicable diseases  Diagnosis ICD: Z20.828  Additional Clinician Notes:   If your symptoms are not improving or worsen, please go to one of our urgent care locations for evaluation and possible lab work.

## 2020-12-11 LAB
SARS-COV-2 RNA SPEC QL NAA+PROBE: NOT DETECTED
SPECIMEN SOURCE: NORMAL

## 2020-12-15 ENCOUNTER — TELEPHONE (OUTPATIENT)
Dept: FAMILY MEDICINE | Facility: CLINIC | Age: 50
End: 2020-12-15

## 2020-12-15 NOTE — TELEPHONE ENCOUNTER
Advair and Albuterol was filled by Kianna Peguero and sent to pharmacy.     Mariela Solano, NADEEM 11:58 AM  12/15/2020

## 2021-01-04 ENCOUNTER — HEALTH MAINTENANCE LETTER (OUTPATIENT)
Age: 51
End: 2021-01-04

## 2021-01-04 DIAGNOSIS — K70.10 ALCOHOLIC HEPATITIS WITHOUT ASCITES (H): ICD-10-CM

## 2021-01-04 LAB
AFP SERPL-MCNC: 2.9 UG/L (ref 0–8)
ALBUMIN SERPL-MCNC: 3.5 G/DL (ref 3.4–5)
ALP SERPL-CCNC: 75 U/L (ref 40–150)
ALT SERPL W P-5'-P-CCNC: 27 U/L (ref 0–70)
ANION GAP SERPL CALCULATED.3IONS-SCNC: 6 MMOL/L (ref 3–14)
AST SERPL W P-5'-P-CCNC: 18 U/L (ref 0–45)
BILIRUB DIRECT SERPL-MCNC: 0.1 MG/DL (ref 0–0.2)
BILIRUB SERPL-MCNC: 0.3 MG/DL (ref 0.2–1.3)
BUN SERPL-MCNC: 12 MG/DL (ref 7–30)
CALCIUM SERPL-MCNC: 8.9 MG/DL (ref 8.5–10.1)
CHLORIDE SERPL-SCNC: 105 MMOL/L (ref 94–109)
CO2 SERPL-SCNC: 31 MMOL/L (ref 20–32)
CREAT SERPL-MCNC: 0.84 MG/DL (ref 0.66–1.25)
ERYTHROCYTE [DISTWIDTH] IN BLOOD BY AUTOMATED COUNT: 15.1 % (ref 10–15)
GFR SERPL CREATININE-BSD FRML MDRD: >90 ML/MIN/{1.73_M2}
GLUCOSE SERPL-MCNC: 90 MG/DL (ref 70–99)
HCT VFR BLD AUTO: 46.6 % (ref 40–53)
HGB BLD-MCNC: 14.8 G/DL (ref 13.3–17.7)
INR PPP: 1 (ref 0.86–1.14)
MCH RBC QN AUTO: 28.4 PG (ref 26.5–33)
MCHC RBC AUTO-ENTMCNC: 31.8 G/DL (ref 31.5–36.5)
MCV RBC AUTO: 89 FL (ref 78–100)
PLATELET # BLD AUTO: 340 10E9/L (ref 150–450)
POTASSIUM SERPL-SCNC: 4 MMOL/L (ref 3.4–5.3)
PROT SERPL-MCNC: 7.4 G/DL (ref 6.8–8.8)
RBC # BLD AUTO: 5.21 10E12/L (ref 4.4–5.9)
SODIUM SERPL-SCNC: 141 MMOL/L (ref 133–144)
WBC # BLD AUTO: 6.1 10E9/L (ref 4–11)

## 2021-01-04 PROCEDURE — 80076 HEPATIC FUNCTION PANEL: CPT | Performed by: PATHOLOGY

## 2021-01-04 PROCEDURE — 36415 COLL VENOUS BLD VENIPUNCTURE: CPT | Performed by: PATHOLOGY

## 2021-01-04 PROCEDURE — 99000 SPECIMEN HANDLING OFFICE-LAB: CPT | Performed by: PATHOLOGY

## 2021-01-04 PROCEDURE — 85027 COMPLETE CBC AUTOMATED: CPT | Performed by: PATHOLOGY

## 2021-01-04 PROCEDURE — 85610 PROTHROMBIN TIME: CPT | Performed by: PATHOLOGY

## 2021-01-04 PROCEDURE — 82105 ALPHA-FETOPROTEIN SERUM: CPT | Mod: 90 | Performed by: PATHOLOGY

## 2021-01-04 PROCEDURE — 80048 BASIC METABOLIC PNL TOTAL CA: CPT | Performed by: PATHOLOGY

## 2021-01-05 ENCOUNTER — VIRTUAL VISIT (OUTPATIENT)
Dept: ONCOLOGY | Facility: CLINIC | Age: 51
End: 2021-01-05
Attending: INTERNAL MEDICINE
Payer: COMMERCIAL

## 2021-01-05 VITALS — WEIGHT: 175 LBS | BODY MASS INDEX: 25.11 KG/M2

## 2021-01-05 DIAGNOSIS — R79.89 ELEVATED PLATELET COUNT: Primary | ICD-10-CM

## 2021-01-05 DIAGNOSIS — D64.9 ANEMIA, UNSPECIFIED TYPE: ICD-10-CM

## 2021-01-05 PROCEDURE — 99213 OFFICE O/P EST LOW 20 MIN: CPT | Mod: GT | Performed by: INTERNAL MEDICINE

## 2021-01-05 PROCEDURE — 999N001193 HC VIDEO/TELEPHONE VISIT; NO CHARGE

## 2021-01-05 RX ORDER — LEVOTHYROXINE SODIUM 150 UG/1
150 TABLET ORAL DAILY
COMMUNITY
End: 2021-08-30

## 2021-01-05 ASSESSMENT — PAIN SCALES - GENERAL: PAINLEVEL: NO PAIN (0)

## 2021-01-05 NOTE — LETTER
1/5/2021         RE: Ming Uribe  [address]  [address]  [place] [address]        Dear Colleague,    Thank you for referring your patient, Ming Uribe, to the [facility]. Please see a copy of my visit note below.    Zaina Uribe is a 50 year old male  who is being evaluated via a billable video visit.      How would you like to obtain your AVS? MyChart     Patient in the virtual lobby           Video-Visit Details    Type of service:  Video Visit      Originating Location (pt. Location): Home    Distant Location (provider location):  [facility]     Platform used for Video Visit: WanderableWell    Visit Date:   01/05/2021     HEMATOLOGY HISTORY: Mr. Ming Uribe is a gentleman with thrombocytosis and macrocytic anemia secondary to alcohol abuse.  1.  On 08/12/2020, normal WBC of 6.2, normal hemoglobin of 14.2 and normal platelets of 180.   -On 08/25/2020, platelets of 529.   -On 08/25/2020, folate of 13.7 and B12 of 1338.   -On 08/25/2020,Hepatitis B and HIV negative.   2. On 08/31/2020:  -Platelets of 528, hemoglobin of 12.3 and MCV of 110. Normal WBC.   -Elevated AST, ALT, and bilirubin of 10.5.   -Normal TSH.   3.  Multiple labs were done on 09/16/2020:   -WBC of 7.3, hemoglobin of 12.6 and platelets of 594.  MCV of 106.   -Normal electrolytes.   -LFT  better.   -Low ammonia.   4.  Multiple imaging studies were done in [facility]:   -CT abdomen and pelvis on 08/12/2020 revealed hepatomegaly with hepatic steatosis. Normal spleen.  -Chest x-ray on 08/12/2020 did not reveal any infection.   -CT lumbar spine on 08/12/2020 did not reveal any fracture or subluxation.   -Ultrasound liver on 08/24/2020 revealed hepatomegaly with hepatic steatosis.  No focal liver lesion.      SUBJECTIVE:  Mr. Ming Uribe is a 50-year-old gentleman who has previously been evaluated for thrombocytosis and macrocytic anemia.  The patient was  abusing alcohol.  It was felt that this was related to alcohol abuse.  The patient has stopped drinking.  The patient has alcoholic hepatitis and has seen a gastroenterologist.      The patient says that he is feeling good.  He is not drinking alcohol anymore.  He is not in pain.  No chest pain or shortness of breath.  No abdominal pain, nausea or vomiting.  He is eating good.  No bleeding.      PHYSICAL EXAMINATION:   GENERAL:  He is alert, oriented x 3.  He is comfortable.   RESPIRATORY:  No cough.  No respiratory distress.    The rest of a comprehensive physical examination is deferred due to public [facility] emergency video visit restrictions.      LABORATORY DATA:  Reviewed.      ASSESSMENT:    1.  A 50-year-old gentleman with thrombocytosis and anemia.  They have resolved.   2.  Alcoholic hepatitis, improved.      PLAN:   1.  Labs were reviewed with the patient.  I explained to him that CBC and CMP are normal.  He was happy to know that.  I explained to the patient that his abnormalities were because of alcohol abuse.  He has quit alcohol and his labs are back to normal.  The patient was congratulated on quitting alcohol.  I advised him not to resume alcohol.   2.  No followup needed in Hematology/Oncology Clinic.  He will continue to follow-up with his gastroenterologist.  I advised him to call us with any questions or concerns.         MARLENE ANGEL MD             D: 2021   T: 2021   MT: DORINA      Name:     KATHARINE HODGSON   MRN:      [id number]        Account:      [id number]   :      1970           Visit Date:   2021      Document: [id number]      Video time of 10 minutes.    This office note has been dictated.          Again, thank you for allowing me to participate in the care of your patient.        Sincerely,        Marlene Angel MD

## 2021-01-05 NOTE — LETTER
1/5/2021         RE: Ming Uribe  [address]  [address]  [place] [address]        Dear Colleague,    Thank you for referring your patient, Ming Uribe, to the [facility]. Please see a copy of my visit note below.    Zaina Uribe is a 50 year old male  who is being evaluated via a billable video visit.      How would you like to obtain your AVS? MyChart     Patient in the virtual lobby           Video-Visit Details    Type of service:  Video Visit      Originating Location (pt. Location): Home    Distant Location (provider location):  [facility]     Platform used for Video Visit: elicitWell    Visit Date:   01/05/2021     HEMATOLOGY HISTORY: Mr. Ming Uribe is a gentleman with thrombocytosis and macrocytic anemia secondary to alcohol abuse.  1.  On 08/12/2020, normal WBC of 6.2, normal hemoglobin of 14.2 and normal platelets of 180.   -On 08/25/2020, platelets of 529.   -On 08/25/2020, folate of 13.7 and B12 of 1338.   -On 08/25/2020,Hepatitis B and HIV negative.   2. On 08/31/2020:  -Platelets of 528, hemoglobin of 12.3 and MCV of 110. Normal WBC.   -Elevated AST, ALT, and bilirubin of 10.5.   -Normal TSH.   3.  Multiple labs were done on 09/16/2020:   -WBC of 7.3, hemoglobin of 12.6 and platelets of 594.  MCV of 106.   -Normal electrolytes.   -LFT  better.   -Low ammonia.   4.  Multiple imaging studies were done in [facility]:   -CT abdomen and pelvis on 08/12/2020 revealed hepatomegaly with hepatic steatosis. Normal spleen.  -Chest x-ray on 08/12/2020 did not reveal any infection.   -CT lumbar spine on 08/12/2020 did not reveal any fracture or subluxation.   -Ultrasound liver on 08/24/2020 revealed hepatomegaly with hepatic steatosis.  No focal liver lesion.      SUBJECTIVE:  Mr. Ming Uribe is a 50-year-old gentleman who has previously been evaluated for thrombocytosis and macrocytic anemia.  The patient was  abusing alcohol.  It was felt that this was related to alcohol abuse.  The patient has stopped drinking.  The patient has alcoholic hepatitis and has seen a gastroenterologist.      The patient says that he is feeling good.  He is not drinking alcohol anymore.  He is not in pain.  No chest pain or shortness of breath.  No abdominal pain, nausea or vomiting.  He is eating good.  No bleeding.      PHYSICAL EXAMINATION:   GENERAL:  He is alert, oriented x 3.  He is comfortable.   RESPIRATORY:  No cough.  No respiratory distress.    The rest of a comprehensive physical examination is deferred due to public [facility] emergency video visit restrictions.      LABORATORY DATA:  Reviewed.      ASSESSMENT:    1.  A 50-year-old gentleman with thrombocytosis and anemia.  They have resolved.   2.  Alcoholic hepatitis, improved.      PLAN:   1.  Labs were reviewed with the patient.  I explained to him that CBC and CMP are normal.  He was happy to know that.  I explained to the patient that his abnormalities were because of alcohol abuse.  He has quit alcohol and his labs are back to normal.  The patient was congratulated on quitting alcohol.  I advised him not to resume alcohol.   2.  No followup needed in Hematology/Oncology Clinic.  He will continue to follow-up with his gastroenterologist.  I advised him to call us with any questions or concerns.         MARLENE ANGEL MD             D: 2021   T: 2021   MT: DORINA      Name:     KATHARINE HODGSON   MRN:      [id number]        Account:      [id number]   :      1970           Visit Date:   2021      Document: [id number]      Video time of 10 minutes.    This office note has been dictated.          Again, thank you for allowing me to participate in the care of your patient.        Sincerely,        Marlene Angel MD

## 2021-01-05 NOTE — PROGRESS NOTES
Zaina Uribe is a 50 year old male  who is being evaluated via a billable video visit.      How would you like to obtain your AVS? Audrat     Patient in the virtual lobby           Video-Visit Details    Type of service:  Video Visit      Originating Location (pt. Location): Home    Distant Location (provider location):  [facility]     Platform used for Video Visit: NadeemWell

## 2021-01-05 NOTE — PROGRESS NOTES
Visit Date:   01/05/2021     HEMATOLOGY HISTORY: Mr. Ming Uribe is a gentleman with thrombocytosis and macrocytic anemia secondary to alcohol abuse.  1.  On 08/12/2020, normal WBC of 6.2, normal hemoglobin of 14.2 and normal platelets of 180.   -On 08/25/2020, platelets of 529.   -On 08/25/2020, folate of 13.7 and B12 of 1338.   -On 08/25/2020,Hepatitis B and HIV negative.   2. On 08/31/2020:  -Platelets of 528, hemoglobin of 12.3 and MCV of 110. Normal WBC.   -Elevated AST, ALT, and bilirubin of 10.5.   -Normal TSH.   3.  Multiple labs were done on 09/16/2020:   -WBC of 7.3, hemoglobin of 12.6 and platelets of 594.  MCV of 106.   -Normal electrolytes.   -LFT  better.   -Low ammonia.   4.  Multiple imaging studies were done in [facility]:   -CT abdomen and pelvis on 08/12/2020 revealed hepatomegaly with hepatic steatosis. Normal spleen.  -Chest x-ray on 08/12/2020 did not reveal any infection.   -CT lumbar spine on 08/12/2020 did not reveal any fracture or subluxation.   -Ultrasound liver on 08/24/2020 revealed hepatomegaly with hepatic steatosis.  No focal liver lesion.      SUBJECTIVE:  Mr. Ming Uribe is a 50-year-old gentleman who has previously been evaluated for thrombocytosis and macrocytic anemia.  The patient was abusing alcohol.  It was felt that this was related to alcohol abuse.  The patient has stopped drinking.  The patient has alcoholic hepatitis and has seen a gastroenterologist.      The patient says that he is feeling good.  He is not drinking alcohol anymore.  He is not in pain.  No chest pain or shortness of breath.  No abdominal pain, nausea or vomiting.  He is eating good.  No bleeding.      PHYSICAL EXAMINATION:   GENERAL:  He is alert, oriented x 3.  He is comfortable.   RESPIRATORY:  No cough.  No respiratory distress.    The rest of a comprehensive physical examination is deferred due to public health emergency video visit restrictions.      LABORATORY DATA:   Reviewed.      ASSESSMENT:    1.  A 50-year-old gentleman with thrombocytosis and anemia.  They have resolved.   2.  Alcoholic hepatitis, improved.      PLAN:   1.  Labs were reviewed with the patient.  I explained to him that CBC and CMP are normal.  He was happy to know that.  I explained to the patient that his abnormalities were because of alcohol abuse.  He has quit alcohol and his labs are back to normal.  The patient was congratulated on quitting alcohol.  I advised him not to resume alcohol.   2.  No followup needed in Hematology/Oncology Clinic.  He will continue to follow-up with his gastroenterologist.  I advised him to call us with any questions or concerns.         MARLENE ANGEL MD             D: 2021   T: 2021   MT: DORINA      Name:     KATHARINE HODGSON   MRN:      [id number]        Account:      [id number]   :      1970           Visit Date:   2021      Document: [id number]      Video time of 10 minutes.

## 2021-01-12 ENCOUNTER — TELEPHONE (OUTPATIENT)
Dept: GASTROENTEROLOGY | Facility: CLINIC | Age: 51
End: 2021-01-12

## 2021-03-23 ENCOUNTER — VIRTUAL VISIT (OUTPATIENT)
Dept: FAMILY MEDICINE | Facility: CLINIC | Age: 51
End: 2021-03-23
Payer: COMMERCIAL

## 2021-03-23 DIAGNOSIS — Z53.9 NO SHOW: Primary | ICD-10-CM

## 2021-03-23 ASSESSMENT — ANXIETY QUESTIONNAIRES
1. FEELING NERVOUS, ANXIOUS, OR ON EDGE: NEARLY EVERY DAY
6. BECOMING EASILY ANNOYED OR IRRITABLE: SEVERAL DAYS
5. BEING SO RESTLESS THAT IT IS HARD TO SIT STILL: NEARLY EVERY DAY
7. FEELING AFRAID AS IF SOMETHING AWFUL MIGHT HAPPEN: NOT AT ALL
3. WORRYING TOO MUCH ABOUT DIFFERENT THINGS: MORE THAN HALF THE DAYS
GAD7 TOTAL SCORE: 15
2. NOT BEING ABLE TO STOP OR CONTROL WORRYING: NEARLY EVERY DAY

## 2021-03-23 ASSESSMENT — PATIENT HEALTH QUESTIONNAIRE - PHQ9
SUM OF ALL RESPONSES TO PHQ QUESTIONS 1-9: 16
5. POOR APPETITE OR OVEREATING: NEARLY EVERY DAY

## 2021-03-23 ASSESSMENT — PAIN SCALES - GENERAL: PAINLEVEL: NO PAIN (0)

## 2021-03-23 NOTE — NURSING NOTE
50 year old  Chief Complaint   Patient presents with     Anxiety     Requesting anxiety medication.       Mariela Solano, RMA  March 23, 2021 1:18 PM

## 2021-03-23 NOTE — PROGRESS NOTES
"Ming is a 50 year old who is being evaluated via a billable video visit.      How would you like to obtain your AVS? Kaposthart  If the video visit is dropped, the invitation should be resent by: Other e-mail: [facility]  Will anyone else be joining your video visit? No  {If patient encounters technical issues they should call [phone] :003609}    Video Start Time: {video visit start/end time for provider to select:946731}    {PROVIDER CHARTING PREFERENCE:549343}    Subjective   Ming is a 50 year old who presents for the following health issues {ACCOMPANIED BY STATEMENT (Optional):037714}    HPI   {ALERT  Recent PHQ-9 score indicates suicidal ideations :884009}{Provider Documentation  Link to [facility] ([facility]) Flowsheet :452313}  {SUPERLIST (Optional):747921}  {additonal problems for provider to add (Optional):442740}    Review of Systems   {ROS COMP (Optional):393738}      Objective    Vitals - Patient Reported  Pain Score: No Pain (0)        Physical Exam   {video visit exam brief selected:992492::\"GENERAL: Healthy, alert and no distress\",\"EYES: Eyes grossly normal to inspection.  No discharge or erythema, or obvious scleral/conjunctival abnormalities.\",\"RESP: No audible wheeze, cough, or visible cyanosis.  No visible retractions or increased work of breathing.  \",\"SKIN: Visible skin clear. No significant rash, abnormal pigmentation or lesions.\",\"NEURO: Cranial nerves grossly intact.  Mentation and speech appropriate for age.\",\"PSYCH: Mentation appears normal, affect normal/bright, judgement and insight intact, normal speech and appearance well-groomed.\"}    {Diagnostic Test Results (Optional):011189}    {AMBULATORY ATTESTATION (Optional):703569}        Video-Visit Details    Type of service:  Video Visit    Video End Time:{video visit start/end time for provider to select:417359}    Originating Location (pt. Location): {video visit patient location:991567::\"Home\"}    Distant Location (provider " "location):  [facility] [place]     Platform used for Video Visit: {Virtual Visit Platforms:348771::\"Tarsha\"}  "

## 2021-03-23 NOTE — PATIENT INSTRUCTIONS
[facility] Medication Refill Request Information:  * Please contact your pharmacy regarding ANY request for medication refills.  ** NP Clinic Prescription Fax = [phone]  * Please allow 3 business days for routine medication refills.  * Please allow 5 business days for controlled substance medication refills.     [facility] Test Result notification information:  *You will be notified with in 7-10 days of your appointment day regarding the results of your test.  If you are on MyChart you will be notified as soon as the provider has reviewed the results and signed off on them.    [facility]: [phone]     If you have questions regarding Covid-19 and the Covid-19 vaccine, please visit this website.    [email or web address]

## 2021-03-24 ASSESSMENT — ANXIETY QUESTIONNAIRES: GAD7 TOTAL SCORE: 15

## 2021-03-25 ENCOUNTER — VIRTUAL VISIT (OUTPATIENT)
Dept: FAMILY MEDICINE | Facility: CLINIC | Age: 51
End: 2021-03-25
Payer: COMMERCIAL

## 2021-03-25 DIAGNOSIS — F41.9 ANXIETY: Primary | ICD-10-CM

## 2021-03-25 DIAGNOSIS — F51.01 PRIMARY INSOMNIA: ICD-10-CM

## 2021-03-25 RX ORDER — LORAZEPAM 1 MG/1
1 TABLET ORAL 2 TIMES DAILY PRN
Qty: 30 TABLET | Refills: 0 | Status: SHIPPED | OUTPATIENT
Start: 2021-03-25 | End: 2021-06-10 | Stop reason: ALTCHOICE

## 2021-03-25 ASSESSMENT — ANXIETY QUESTIONNAIRES
2. NOT BEING ABLE TO STOP OR CONTROL WORRYING: NEARLY EVERY DAY
3. WORRYING TOO MUCH ABOUT DIFFERENT THINGS: NEARLY EVERY DAY
5. BEING SO RESTLESS THAT IT IS HARD TO SIT STILL: NEARLY EVERY DAY
7. FEELING AFRAID AS IF SOMETHING AWFUL MIGHT HAPPEN: NOT AT ALL
GAD7 TOTAL SCORE: 15
IF YOU CHECKED OFF ANY PROBLEMS ON THIS QUESTIONNAIRE, HOW DIFFICULT HAVE THESE PROBLEMS MADE IT FOR YOU TO DO YOUR WORK, TAKE CARE OF THINGS AT HOME, OR GET ALONG WITH OTHER PEOPLE: SOMEWHAT DIFFICULT
1. FEELING NERVOUS, ANXIOUS, OR ON EDGE: NEARLY EVERY DAY
6. BECOMING EASILY ANNOYED OR IRRITABLE: NOT AT ALL

## 2021-03-25 ASSESSMENT — PATIENT HEALTH QUESTIONNAIRE - PHQ9
SUM OF ALL RESPONSES TO PHQ QUESTIONS 1-9: 13
5. POOR APPETITE OR OVEREATING: NEARLY EVERY DAY

## 2021-03-25 NOTE — PROGRESS NOTES
I called Mr. Uribe 2 times on 3/24/21 with no answer.  On 3/25/21 I reached Mr. Uribe, he said he had been with a friend's daughter who was having an emotional crisis and he was trying to help her.  He would like to follow up with us, we will call and set up an appointment.

## 2021-03-25 NOTE — PROGRESS NOTES
Ming is a 50 year old who is being evaluated via a billable video visit.      How would you like to obtain your AVS? MyChart  If the video visit is dropped, the invitation should be resent by: Text to cell phone: [phone]  Will anyone else be joining your video visit? No     Video Start Time: 1422    Subjective   Ming is a 50 year old who presents for the following health concerns:  He is experiencing anxiety and insomnia related to going through a divorce.  He was scheduled for an appointment on Tuesday with me that he said he missed as he was in [place] and helping his girlfriend's daughter with some emotional issues.  At the previous visit the PHQ-9 had triggered a concern for suicidal thoughts, he states he has had brief thoughts but would never do anything like that, does not have frequent thoughts and has no plan.    Ming received divorce papers and has significant trouble sleeping.  He has good support and a therapist, he feels supported in that way.  He is very anxious because he is not sleeping.  His mental health is supported by biking, his bikes are in [place], he is going to get them in 3 weeks and would like a bridge of medication for sleep and anxiety until then.  He is not interested in a long term medication.     Review of Systems   CONSTITUTIONAL: NEGATIVE for fever, chills, change in weight  ENT/MOUTH: NEGATIVE for ear, mouth and throat problems  RESP: NEGATIVE for significant cough or SOB  CV: NEGATIVE for chest pain, palpitations or peripheral edema      Objective           Vitals:  No vitals were obtained today due to virtual visit.    Physical Exam   GENERAL: Healthy, alert and no distress  EYES: Eyes grossly normal to inspection.  No discharge or erythema, or obvious scleral/conjunctival abnormalities.  RESP: No audible wheeze, cough, or visible cyanosis.  No visible retractions or increased work of breathing.    SKIN: Visible skin clear. No significant rash, abnormal pigmentation  or lesions.  NEURO: Cranial nerves grossly intact.  Mentation and speech appropriate for age.  PSYCH: Mentation appears normal, affect normal/bright, judgement and insight intact, normal speech and appearance well-groomed.    1. Anxiety      2. Primary insomnia    - LORazepam (ATIVAN) 1 MG tablet; Take 1 tablet (1 mg) by mouth 2 times daily as needed for anxiety  Dispense: 30 tablet; Refill: 0    Ming plans to take the medication for 2-3 weeks and then stop.  He feels with riding his bike and time his symptoms will be fine.  He will let us know if there is more we can do.    Video-Visit Details    Type of service:  Video Visit    Video End Time:2:45 PM    Originating Location (pt. Location): Home    Distant Location (provider location):  [facility]     Platform used for Video Visit: Tarsha

## 2021-03-26 ASSESSMENT — ANXIETY QUESTIONNAIRES: GAD7 TOTAL SCORE: 15

## 2021-04-20 ENCOUNTER — HOSPITAL ENCOUNTER (EMERGENCY)
Facility: CLINIC | Age: 51
Discharge: LEFT BEFORE DISCHARGE | End: 2021-04-20
Payer: COMMERCIAL

## 2021-04-20 ENCOUNTER — NURSE TRIAGE (OUTPATIENT)
Dept: NURSING | Facility: CLINIC | Age: 51
End: 2021-04-20

## 2021-04-20 ENCOUNTER — E-VISIT (OUTPATIENT)
Dept: FAMILY MEDICINE | Facility: CLINIC | Age: 51
End: 2021-04-20
Payer: COMMERCIAL

## 2021-04-20 VITALS
BODY MASS INDEX: 25.83 KG/M2 | WEIGHT: 180.4 LBS | HEART RATE: 111 BPM | SYSTOLIC BLOOD PRESSURE: 152 MMHG | TEMPERATURE: 98 F | HEIGHT: 70 IN | DIASTOLIC BLOOD PRESSURE: 92 MMHG | OXYGEN SATURATION: 94 % | RESPIRATION RATE: 18 BRPM

## 2021-04-20 DIAGNOSIS — K62.5 RECTAL BLEEDING: Primary | ICD-10-CM

## 2021-04-20 PROCEDURE — 99207 PR NON-BILLABLE SERV PER CHARTING: CPT | Performed by: FAMILY MEDICINE

## 2021-04-20 ASSESSMENT — MIFFLIN-ST. JEOR: SCORE: 1689.54

## 2021-04-20 NOTE — TELEPHONE ENCOUNTER
Triage -  please call pt asap - he should be evaluated at the ER with what sounds like ongoing rectal bleeding.  Thanks!  CW

## 2021-04-20 NOTE — TELEPHONE ENCOUNTER
Patient calling - says at 2:30 am he woke up and thought he had to have a bowel movement.  He sat on the toilet and passed only blood via rectum. Says it was about 3 ounces of bright red blood with clots and/or mucus.  He had 2 episode of diarrhea around 7am.  Has passed bright red blood with clots and/or mucus once every hour since then.  Also has lower abdominal pain.    Triaged to disposition of Go to ED Now.  Patient says he did go to the ED, waited 7 hours and then left before being seen.  Advised patient to return to ED for evaluation and treatment.    Carrie Shields, HECTOR  Triage Nurse Advisor    COVID 19 Nurse Triage Plan/Patient Instructions    Please be aware that novel coronavirus (COVID-19) may be circulating in the community. If you develop symptoms such as fever, cough, or SOB or if you have concerns about the presence of another infection including coronavirus (COVID-19), please contact your health care provider or visit [email or web address].     Disposition/Instructions    ED Visit recommended. Follow protocol based instructions.     Bring Your Own Device:  Please also bring your smart device(s) (smart phones, tablets, laptops) and their charging cables for your personal use and to communicate with your care team during your visit.    Thank you for taking steps to prevent the spread of this virus.  o Limit your contact with others.  o Wear a simple mask to cover your cough.  o Wash your hands well and often.    Resources    [facility]: About COVID-19: [email or web address]    CDC: What to Do If You're Sick: www.cdc.gov/coronavirus/2019-ncov/about/steps-when-sick.html    CDC: Ending Home Isolation: www.cdc.gov/coronavirus/2019-ncov/hcp/disposition-in-home-patients.html     CDC: Caring for Someone: www.cdc.gov/coronavirus/2019-ncov/if-you-are-sick/care-for-someone.html     MD: Interim Guidance for Hospital Discharge to Home:  "www.health.[facility].mn.us/diseases/coronavirus/hcp/hospdischarge.pdf    [facility] clinical trials (COVID-19 research studies): clinicalaffairs.[facility]/umn-clinical-trials     Below are the COVID-19 hotlines at the [place] Department of Health ([facility]). Interpreters are available.   o For health questions: Call 846-856-1903 or 1-760.201.7626 (7 a.m. to 7 p.m.)  o For questions about schools and childcare: Call 134-514-0664 or 1-513.897.3634 (7 a.m. to 7 p.m.)     Reason for Disposition    SEVERE rectal bleeding (large blood clots; on and off, or constant bleeding)    Additional Information    Negative: Shock suspected (e.g., cold/pale/clammy skin, too weak to stand, low BP, rapid pulse)    Negative: Difficult to awaken or acting confused (e.g., disoriented, slurred speech)    Negative: Passed out (i.e., lost consciousness, collapsed and was not responding)    Negative: [1] Vomiting AND [2] contains red blood or black (\"coffee ground\") material  (Exception: few red streaks in vomit that only happened once)    Negative: Sounds like a life-threatening emergency to the triager    Negative: Diarrhea is main symptom    Negative: Stool color other than brown or tan is main concern  (no bleeding and no melena)    Protocols used: RECTAL BLEEDING-A-AH      "

## 2021-04-20 NOTE — ED NOTES
Patient requested to leave ED. Patient signed declination of medical screening form prior to leaving ED.

## 2021-04-20 NOTE — ED TRIAGE NOTES
Patient presents ambulatory to triage with c/o rectal bleeding. Patient reports bright red blood per rectum since 0200, reports an episode of bleeding every 30 minutes. Also reports abdominal pain and weakness due to not eating the past two days. Not on a blood thinner.

## 2021-04-21 ENCOUNTER — OFFICE VISIT (OUTPATIENT)
Dept: FAMILY MEDICINE | Facility: CLINIC | Age: 51
End: 2021-04-21
Payer: COMMERCIAL

## 2021-04-21 VITALS
SYSTOLIC BLOOD PRESSURE: 145 MMHG | HEIGHT: 70 IN | HEART RATE: 111 BPM | BODY MASS INDEX: 25.77 KG/M2 | WEIGHT: 180 LBS | OXYGEN SATURATION: 100 % | TEMPERATURE: 98.4 F | DIASTOLIC BLOOD PRESSURE: 111 MMHG

## 2021-04-21 DIAGNOSIS — R10.32 LEFT LOWER QUADRANT PAIN: ICD-10-CM

## 2021-04-21 DIAGNOSIS — K62.5 RECTAL BLEEDING: Primary | ICD-10-CM

## 2021-04-21 DIAGNOSIS — F10.20 ALCOHOLISM (H): ICD-10-CM

## 2021-04-21 DIAGNOSIS — R03.0 ELEVATED BP WITHOUT DIAGNOSIS OF HYPERTENSION: ICD-10-CM

## 2021-04-21 DIAGNOSIS — Z20.2 STD EXPOSURE: ICD-10-CM

## 2021-04-21 DIAGNOSIS — E03.9 ACQUIRED HYPOTHYROIDISM: ICD-10-CM

## 2021-04-21 DIAGNOSIS — K62.5 RECTAL BLEEDING: ICD-10-CM

## 2021-04-21 DIAGNOSIS — R10.84 ABDOMINAL PAIN, GENERALIZED: ICD-10-CM

## 2021-04-21 LAB
ALBUMIN SERPL-MCNC: 3.3 G/DL (ref 3.4–5)
ALP SERPL-CCNC: 109 U/L (ref 40–150)
ALT SERPL W P-5'-P-CCNC: 95 U/L (ref 0–70)
ANION GAP SERPL CALCULATED.3IONS-SCNC: 10 MMOL/L (ref 3–14)
AST SERPL W P-5'-P-CCNC: 142 U/L (ref 0–45)
BILIRUB SERPL-MCNC: 1.1 MG/DL (ref 0.2–1.3)
BUN SERPL-MCNC: 5 MG/DL (ref 7–30)
CALCIUM SERPL-MCNC: 9.7 MG/DL (ref 8.5–10.1)
CHLORIDE SERPL-SCNC: 94 MMOL/L (ref 94–109)
CO2 SERPL-SCNC: 31 MMOL/L (ref 20–32)
CREAT SERPL-MCNC: 0.78 MG/DL (ref 0.66–1.25)
ERYTHROCYTE [DISTWIDTH] IN BLOOD BY AUTOMATED COUNT: 13.5 % (ref 10–15)
GFR SERPL CREATININE-BSD FRML MDRD: >90 ML/MIN/{1.73_M2}
GLUCOSE SERPL-MCNC: 116 MG/DL (ref 70–99)
HCT VFR BLD AUTO: 50.4 % (ref 40–53)
HGB BLD-MCNC: 16.9 G/DL (ref 13.3–17.7)
MAGNESIUM SERPL-MCNC: 1.4 MG/DL (ref 1.6–2.3)
MCH RBC QN AUTO: 31 PG (ref 26.5–33)
MCHC RBC AUTO-ENTMCNC: 33.5 G/DL (ref 31.5–36.5)
MCV RBC AUTO: 92 FL (ref 78–100)
PLATELET # BLD AUTO: 112 10E9/L (ref 150–450)
POTASSIUM SERPL-SCNC: 2.8 MMOL/L (ref 3.4–5.3)
PROT SERPL-MCNC: 8.7 G/DL (ref 6.8–8.8)
RBC # BLD AUTO: 5.46 10E12/L (ref 4.4–5.9)
SODIUM SERPL-SCNC: 135 MMOL/L (ref 133–144)
T4 FREE SERPL-MCNC: 1.26 NG/DL (ref 0.76–1.46)
TSH SERPL DL<=0.005 MIU/L-ACNC: 0.34 MU/L (ref 0.4–4)
WBC # BLD AUTO: 6.6 10E9/L (ref 4–11)

## 2021-04-21 PROCEDURE — 87591 N.GONORRHOEAE DNA AMP PROB: CPT | Mod: 90 | Performed by: PATHOLOGY

## 2021-04-21 PROCEDURE — 99214 OFFICE O/P EST MOD 30 MIN: CPT | Performed by: NURSE PRACTITIONER

## 2021-04-21 PROCEDURE — 36415 COLL VENOUS BLD VENIPUNCTURE: CPT | Performed by: PATHOLOGY

## 2021-04-21 PROCEDURE — 83735 ASSAY OF MAGNESIUM: CPT | Performed by: PATHOLOGY

## 2021-04-21 PROCEDURE — 84425 ASSAY OF VITAMIN B-1: CPT | Mod: 90 | Performed by: PATHOLOGY

## 2021-04-21 PROCEDURE — 85027 COMPLETE CBC AUTOMATED: CPT | Performed by: PATHOLOGY

## 2021-04-21 PROCEDURE — 84439 ASSAY OF FREE THYROXINE: CPT | Performed by: PATHOLOGY

## 2021-04-21 PROCEDURE — 99000 SPECIMEN HANDLING OFFICE-LAB: CPT | Performed by: PATHOLOGY

## 2021-04-21 PROCEDURE — 80053 COMPREHEN METABOLIC PANEL: CPT | Performed by: PATHOLOGY

## 2021-04-21 PROCEDURE — 87491 CHLMYD TRACH DNA AMP PROBE: CPT | Mod: 90 | Performed by: PATHOLOGY

## 2021-04-21 PROCEDURE — 84443 ASSAY THYROID STIM HORMONE: CPT | Performed by: PATHOLOGY

## 2021-04-21 RX ORDER — SUCRALFATE ORAL 1 G/10ML
1 SUSPENSION ORAL 4 TIMES DAILY
Qty: 560 ML | Refills: 0 | Status: SHIPPED | OUTPATIENT
Start: 2021-04-21 | End: 2021-05-05

## 2021-04-21 ASSESSMENT — ENCOUNTER SYMPTOMS
CONSTIPATION: 1
TREMORS: 1
CHILLS: 0
FEVER: 0
FATIGUE: 1

## 2021-04-21 ASSESSMENT — MIFFLIN-ST. JEOR: SCORE: 1687.72

## 2021-04-21 ASSESSMENT — PAIN SCALES - GENERAL: PAINLEVEL: MODERATE PAIN (5)

## 2021-04-21 NOTE — PROGRESS NOTES
HPI       Ming Uribe is a 50 year old man who presents with multiple concerns today.   Patient presented to ER yesterday and left prior to being seen.   Chief Complaint   Patient presents with     Rectal Problem     Discuss rectal bleeding. Requesting STD testing.   Rectal bleeding: started 4 days ago after being constipated for a few days. He reports bright red bleeding with every bowel movement. He states that the blood covers the toilet paper, he denies clots. He does drink alcohol daily, he drinks beer/wine. He had a relapse in January, 2021, he had been sober for 6 months prior to that. He is not currently working and his divorce is nearly final so he has had increased stress.   Elevated BP: Ming is not treated for hypertension. He says his bp is usually ok. He is in a lot of pain right now, his last drink of alcohol was last evening, he is feeling shaky.   Left lower quadrant pain: he reports this pain started yesterday with increase in stools.   Generalized abdominal pain: he reports that his whole abdomen is tender to the touch.   Alcoholism: As mentioned, Ming was sober August 2020 to January 2021, has been drinking daily since that time. Reports that he has not had any problems withdrawing in the past. He has not ever needed to take an oral agent to help him manage withdrawal symptoms.   Hypothyroidism: history of hypothyroid, is currently taking 150 mcg Synthroid daily.   STD exposure: Girlfriend called him while he was in clinic today to report that she thinks she has Chlamydia. Ming would like to be tested for this.     Problem, Medication and Allergy Lists were reviewed and updated if needed.    Patient is an established patient of this clinic.       Review of Systems:   Review of Systems     Constitutional:  Positive for fatigue. Negative for fever and chills.   Gastrointestinal:  Positive for constipation and melena.   Neurological:  Positive for tremors.                "Physical Exam:     Vitals:    04/21/21 1113 04/21/21 1115   BP: (!) 148/114 (!) 145/111   Pulse: 111    Temp: 98.4  F (36.9  C)    TempSrc: Oral    SpO2: 100%    Weight: 81.6 kg (180 lb)    Height: 1.778 m (5' 10\")      Body mass index is 25.83 kg/m .  Vitals were reviewed and were normal.     Physical Exam  Vitals signs reviewed.   Constitutional:       General: He is in acute distress.      Appearance: He is diaphoretic.   HENT:      Head: Normocephalic.   Cardiovascular:      Rate and Rhythm: Normal rate and regular rhythm.      Pulses: Normal pulses.      Heart sounds: Normal heart sounds.   Pulmonary:      Effort: Pulmonary effort is normal.      Breath sounds: Normal breath sounds.   Abdominal:      General: Abdomen is flat.      Palpations: Abdomen is soft.      Tenderness: There is generalized abdominal tenderness and tenderness in the left lower quadrant.       Musculoskeletal: Normal range of motion.   Skin:     General: Skin is warm.      Coloration: Skin is not jaundiced.   Neurological:      General: No focal deficit present.      Mental Status: He is alert and oriented to person, place, and time.   Psychiatric:         Mood and Affect: Mood normal.         Behavior: Behavior normal.           Results:     Component      Latest Ref Rng & Units 4/21/2021   Sodium      133 - 144 mmol/L 135   Potassium      3.4 - 5.3 mmol/L 2.8 (L)   Chloride      94 - 109 mmol/L 94   Carbon Dioxide      20 - 32 mmol/L 31   Anion Gap      3 - 14 mmol/L 10   Glucose      70 - 99 mg/dL 116 (H)   Urea Nitrogen      7 - 30 mg/dL 5 (L)   Creatinine      0.66 - 1.25 mg/dL 0.78   GFR Estimate      >60 mL/min/1.73:m2 >90   GFR Estimate If Black      >60 mL/min/1.73:m2 >90   Calcium      8.5 - 10.1 mg/dL 9.7   Bilirubin Total      0.2 - 1.3 mg/dL 1.1   Albumin      3.4 - 5.0 g/dL 3.3 (L)   Protein Total      6.8 - 8.8 g/dL 8.7   Alkaline Phosphatase      40 - 150 U/L 109   ALT      0 - 70 U/L 95 (H)   AST      0 - 45 U/L 142 (H) "   WBC      4.0 - 11.0 10e9/L 6.6   RBC Count      4.4 - 5.9 10e12/L 5.46   Hemoglobin      13.3 - 17.7 g/dL 16.9   Hematocrit      40.0 - 53.0 % 50.4   MCV      78 - 100 fl 92   MCH      26.5 - 33.0 pg 31.0   MCHC      31.5 - 36.5 g/dL 33.5   RDW      10.0 - 15.0 % 13.5   Platelet Count      150 - 450 10e9/L 112 (L)   T4 Free      0.76 - 1.46 ng/dL 1.26   TSH      0.40 - 4.00 mU/L 0.34 (L)   Magnesium      1.6 - 2.3 mg/dL 1.4 (L)       Assessment and Plan     1. Rectal bleeding    - CBC with platelets; Future  - Comprehensive metabolic panel; Future  - sucralfate (CARAFATE) 1 GM/10ML suspension; Take 10 mLs (1 g) by mouth 4 times daily for 14 days  Dispense: 560 mL; Refill: 0  - CT Abdomen/Pelvis w/o & w contrast; Future    2. Elevated BP without diagnosis of hypertension    - CBC with platelets; Future  - Comprehensive metabolic panel; Future    3. Left lower quadrant pain    - CT Abdomen/Pelvis w/o & w contrast; Future    4. Abdominal pain, generalized    - sucralfate (CARAFATE) 1 GM/10ML suspension; Take 10 mLs (1 g) by mouth 4 times daily for 14 days  Dispense: 560 mL; Refill: 0  - CT Abdomen/Pelvis w/o & w contrast; Future    5. Alcoholism (H)    - CBC with platelets; Future  - Comprehensive metabolic panel; Future  - Vitamin B1 plasma; Future  - Magnesium; Future    6. Acquired hypothyroidism    - TSH; Future  - T4 free; Future    7. STD exposure    - C. trachomatis PCR - Urine, Lab Collect; Future  - N. gonorrhea PCR - Urine, Lab Collect; Future    I spent a total of 30 minutes face-to-face with Ming during today's office visit.  Over 50% of this time was spent counseling the patient and/or coordinating care regarding their care.  See note for details.First, please have your labs and CT abdomen completed today. Next, I will call with results as they come in. If you feel faint or lightheaded, or short of breath, present to er. Abstain from alcohol. Finally, call if you have questions or concerns. Options  for treatment and follow-up care were reviewed with the patient. Ming Uribe  engaged in the decision making process and verbalized understanding of the options discussed and agreed with the final plan.  CHUCK Wood, CNP  Addendum: labs are coming in, concerned about low potassium, magnesium, elevated LFTs. I tried to call Ming, his voicemail is full. I sent a my chart message that I need to talk to him about his lab results.   CHUCK Wood, CNP  Addendum [id number]:my chart message sent, CT results not available, will look for them on 04/26. Recommend patient present to ER with worsening symptoms.   CHUCK Wood, CNP

## 2021-04-21 NOTE — NURSING NOTE
"50 year old  Chief Complaint   Patient presents with     Rectal Problem     Discuss rectal bleeding. Requesting STD testing.       Blood pressure (!) 145/111, pulse 111, temperature 98.4  F (36.9  C), temperature source Oral, height 1.778 m (5' 10\"), weight 81.6 kg (180 lb), SpO2 100 %. Body mass index is 25.83 kg/m .  BP completed using cuff size:      Mariela Solano, NADEEM  April 21, 2021 11:16 AM  "

## 2021-04-21 NOTE — PATIENT INSTRUCTIONS
First, please have your labs and CT abdomen completed today. Next, I will call with results as they come in. If you feel faint or lightheaded, or short of breath, present to er. Abstain from alcohol. Finally, call if you have questions or concerns.   [facility] Medication Refill Request Information:  * Please contact your pharmacy regarding ANY request for medication refills.  ** NP Clinic Prescription Fax = [phone]  * Please allow 3 business days for routine medication refills.  * Please allow 5 business days for controlled substance medication refills.     [facility] Test Result notification information:  *You will be notified with in 7-10 days of your appointment day regarding the results of your test.  If you are on MyChart you will be notified as soon as the provider has reviewed the results and signed off on them.    [facility]: [phone]     If you have questions regarding Covid-19 and the Covid-19 vaccine, please visit this website.    [email or web address]

## 2021-04-22 ENCOUNTER — ANCILLARY PROCEDURE (OUTPATIENT)
Dept: CT IMAGING | Facility: CLINIC | Age: 51
End: 2021-04-22
Attending: NURSE PRACTITIONER
Payer: COMMERCIAL

## 2021-04-22 DIAGNOSIS — R10.32 LEFT LOWER QUADRANT PAIN: ICD-10-CM

## 2021-04-22 DIAGNOSIS — R10.84 ABDOMINAL PAIN, GENERALIZED: ICD-10-CM

## 2021-04-22 DIAGNOSIS — K62.5 RECTAL BLEEDING: ICD-10-CM

## 2021-04-22 LAB
C TRACH DNA SPEC QL NAA+PROBE: NEGATIVE
N GONORRHOEA DNA SPEC QL NAA+PROBE: NEGATIVE
SPECIMEN SOURCE: NORMAL
SPECIMEN SOURCE: NORMAL

## 2021-04-22 PROCEDURE — 74177 CT ABD & PELVIS W/CONTRAST: CPT | Mod: GC | Performed by: RADIOLOGY

## 2021-04-22 RX ORDER — IOPAMIDOL 755 MG/ML
111 INJECTION, SOLUTION INTRAVASCULAR ONCE
Status: COMPLETED | OUTPATIENT
Start: 2021-04-22 | End: 2021-04-22

## 2021-04-22 RX ADMIN — IOPAMIDOL 111 ML: 755 INJECTION, SOLUTION INTRAVASCULAR at 16:10

## 2021-04-23 ENCOUNTER — IMMUNIZATION (OUTPATIENT)
Dept: NURSING | Facility: CLINIC | Age: 51
End: 2021-04-23
Payer: COMMERCIAL

## 2021-04-23 PROCEDURE — 0001A PR COVID VAC PFIZER DIL RECON 30 MCG/0.3 ML IM: CPT

## 2021-04-23 PROCEDURE — 91300 PR COVID VAC PFIZER DIL RECON 30 MCG/0.3 ML IM: CPT

## 2021-04-25 LAB — VIT B1 SERPL-MCNC: 5 NMOL/L (ref 4–15)

## 2021-04-26 ENCOUNTER — OFFICE VISIT (OUTPATIENT)
Dept: FAMILY MEDICINE | Facility: CLINIC | Age: 51
End: 2021-04-26
Payer: COMMERCIAL

## 2021-04-26 ENCOUNTER — HOSPITAL ENCOUNTER (OUTPATIENT)
Facility: AMBULATORY SURGERY CENTER | Age: 51
End: 2021-04-26
Attending: INTERNAL MEDICINE
Payer: COMMERCIAL

## 2021-04-26 ENCOUNTER — TELEPHONE (OUTPATIENT)
Dept: GASTROENTEROLOGY | Facility: CLINIC | Age: 51
End: 2021-04-26

## 2021-04-26 VITALS
TEMPERATURE: 98.1 F | HEART RATE: 94 BPM | OXYGEN SATURATION: 95 % | SYSTOLIC BLOOD PRESSURE: 151 MMHG | BODY MASS INDEX: 25.83 KG/M2 | DIASTOLIC BLOOD PRESSURE: 97 MMHG | WEIGHT: 180 LBS

## 2021-04-26 DIAGNOSIS — K52.9 COLITIS: Primary | ICD-10-CM

## 2021-04-26 DIAGNOSIS — Z11.59 ENCOUNTER FOR SCREENING FOR OTHER VIRAL DISEASES: ICD-10-CM

## 2021-04-26 DIAGNOSIS — Z20.2 STD EXPOSURE: ICD-10-CM

## 2021-04-26 DIAGNOSIS — Z13.6 SCREENING FOR HEART DISEASE: ICD-10-CM

## 2021-04-26 DIAGNOSIS — R10.84 ABDOMINAL PAIN, GENERALIZED: ICD-10-CM

## 2021-04-26 LAB
SPECIMEN SOURCE: NORMAL
T VAGINALIS DNA SPEC QL NAA+PROBE: NORMAL

## 2021-04-26 PROCEDURE — 99213 OFFICE O/P EST LOW 20 MIN: CPT | Performed by: NURSE PRACTITIONER

## 2021-04-26 PROCEDURE — 99000 SPECIMEN HANDLING OFFICE-LAB: CPT | Performed by: PATHOLOGY

## 2021-04-26 PROCEDURE — 87661 TRICHOMONAS VAGINALIS AMPLIF: CPT | Mod: 90 | Performed by: PATHOLOGY

## 2021-04-26 ASSESSMENT — PAIN SCALES - GENERAL: PAINLEVEL: NO PAIN (0)

## 2021-04-26 NOTE — PATIENT INSTRUCTIONS
First, please have fasting labs at your convenience, please make appt for tomorrow am. Complete your urine Trichomonas test. Continue bland diet with 90 oz water daily. Continue Carafate as directed. Schedule colonoscopy upon call. Future considerations include cardiology consult for atherosclerotic calcifications and repeat CT abd to check on status of pancreatic cysts. Finally, return via telephone in 2 days to follow up on results and status. Continue to abstain from alcohol.   [facility] Medication Refill Request Information:  * Please contact your pharmacy regarding ANY request for medication refills.  ** NP Clinic Prescription Fax = [phone]  * Please allow 3 business days for routine medication refills.  * Please allow 5 business days for controlled substance medication refills.     [facility] Test Result notification information:  *You will be notified with in 7-10 days of your appointment day regarding the results of your test.  If you are on MyChart you will be notified as soon as the provider has reviewed the results and signed off on them.    [facility]: [phone]     If you have questions regarding Covid-19 and the Covid-19 vaccine, please visit this website.    [email or web address]

## 2021-04-26 NOTE — TELEPHONE ENCOUNTER
Patient is scheduled for COLONOSCOPY with Dr. SHAWNEE OWEN    Spoke with: KATHARINE    Date of Procedure: 05/06/2021    [facility]    Sedation Type MAC    Conscious Sedation- Needs  for 6 hours after the procedure  MAC/General-Needs  for 24 hours after procedure    Pre-op for [facility] MAC and OR NOT NEEDED    (if yes advise patient they will need a pre-op prior to procedure)      Is patient on blood thinners? -NO (If yes- inform patient to follow up with PCP or provider for follow up instructions)     Informed patient they will need an adult  YES  Cannot take any type of public or medical transportation alone    Informed Patient of COVID Test Requirement YES    Preferred Pharmacy for Pre Prescription NA    Confirmed Nurse will call to complete assessment YES    Additional comments: NA

## 2021-04-26 NOTE — PROGRESS NOTES
HPI       Ming Uribe is a 50 year old man who presents for follow up on status of abdominal pain and colitis.   Chief Complaint   Patient presents with     Recheck Medication     Follow up.   Colitis/Abdominal pain: Ming reports feeling 50% better than last week. He feels that the Carafate is helping. He has also stopped drinking alcohol which he feels has helped him to feel better. He is taking magnesium and potassium supplements. He wants to get tested for Trichomonas as his girlfriend feels that she has a STI and chlamydia was negative.     Problem, Medication and Allergy Lists were reviewed and updated if needed.    Patient is an established patient of this clinic.           Review of Systems:   Review of Systems     Constitutional:  Negative for fever, chills and fatigue.   Gastrointestinal:  Positive for abdominal pain and diarrhea.               Physical Exam:     Vitals:    04/26/21 0833   BP: (!) 151/97   Pulse: 94   Temp: 98.1  F (36.7  C)   SpO2: 95%   Weight: 81.6 kg (180 lb)     Body mass index is 25.83 kg/m .  Vitals were reviewed and were normal     Physical Exam  Vitals signs reviewed.   Constitutional:       Appearance: Normal appearance.   HENT:      Head: Normocephalic.   Musculoskeletal: Normal range of motion.   Skin:     General: Skin is warm and dry.   Neurological:      General: No focal deficit present.      Mental Status: He is alert and oriented to person, place, and time.   Psychiatric:         Mood and Affect: Mood normal.         Behavior: Behavior normal.           Results:   Labs pending.   Assessment and Plan     1. Colitis    - GASTROENTEROLOGY ADULT REF PROCEDURE ONLY; Future  - Comprehensive metabolic panel; Future  - CBC with platelets; Future  - Magnesium; Future    2. Abdominal pain, generalized    - GASTROENTEROLOGY ADULT REF PROCEDURE ONLY; Future  - Comprehensive metabolic panel; Future  - CBC with platelets; Future  - Magnesium; Future    3. Screening for  heart disease    - Lipid Profile FASTING; Future    Patient instructions: First, please have fasting labs completed at your convenience, please make an appt for tomorrow am. Next, please complete your urine Trichomonas test. Continue a bland diet with 90 oz water daily. Continue Carafate as directed. Schedule colonoscopy upon call. Future considerations include cardiology consult for atherosclerotic calcifications. Finally, return via telephone in 2 days to follow up on results and status. Continue to abstain from alcohol. Options for treatment and follow-up care were reviewed with the patient. Ming Uribe  engaged in the decision making process and verbalized understanding of the options discussed and agreed with the final plan.  Kianna Peguero, CHUCK, CNP

## 2021-04-26 NOTE — NURSING NOTE
50 year old  Chief Complaint   Patient presents with     Recheck Medication     Follow up.       Blood pressure (!) 151/97, pulse 94, temperature 98.1  F (36.7  C), weight 81.6 kg (180 lb), SpO2 95 %. Body mass index is 25.83 kg/m .  BP completed using cuff size:      Mariela Solano, NADEEM  April 26, 2021 8:35 AM

## 2021-04-27 DIAGNOSIS — R10.84 ABDOMINAL PAIN, GENERALIZED: ICD-10-CM

## 2021-04-27 DIAGNOSIS — Z13.6 SCREENING FOR HEART DISEASE: ICD-10-CM

## 2021-04-27 DIAGNOSIS — K52.9 COLITIS: ICD-10-CM

## 2021-04-27 LAB
ALBUMIN SERPL-MCNC: 3.1 G/DL (ref 3.4–5)
ALP SERPL-CCNC: 90 U/L (ref 40–150)
ALT SERPL W P-5'-P-CCNC: 71 U/L (ref 0–70)
ANION GAP SERPL CALCULATED.3IONS-SCNC: 6 MMOL/L (ref 3–14)
AST SERPL W P-5'-P-CCNC: 77 U/L (ref 0–45)
BILIRUB SERPL-MCNC: 0.6 MG/DL (ref 0.2–1.3)
BUN SERPL-MCNC: 6 MG/DL (ref 7–30)
CALCIUM SERPL-MCNC: 9.2 MG/DL (ref 8.5–10.1)
CHLORIDE SERPL-SCNC: 101 MMOL/L (ref 94–109)
CHOLEST SERPL-MCNC: 197 MG/DL
CO2 SERPL-SCNC: 30 MMOL/L (ref 20–32)
CREAT SERPL-MCNC: 0.72 MG/DL (ref 0.66–1.25)
ERYTHROCYTE [DISTWIDTH] IN BLOOD BY AUTOMATED COUNT: 13.8 % (ref 10–15)
GFR SERPL CREATININE-BSD FRML MDRD: >90 ML/MIN/{1.73_M2}
GLUCOSE SERPL-MCNC: 90 MG/DL (ref 70–99)
HCT VFR BLD AUTO: 45.7 % (ref 40–53)
HDLC SERPL-MCNC: 56 MG/DL
HGB BLD-MCNC: 14.9 G/DL (ref 13.3–17.7)
LDLC SERPL CALC-MCNC: 124 MG/DL
MAGNESIUM SERPL-MCNC: 2.2 MG/DL (ref 1.6–2.3)
MCH RBC QN AUTO: 31.5 PG (ref 26.5–33)
MCHC RBC AUTO-ENTMCNC: 32.6 G/DL (ref 31.5–36.5)
MCV RBC AUTO: 97 FL (ref 78–100)
NONHDLC SERPL-MCNC: 141 MG/DL
PLATELET # BLD AUTO: 274 10E9/L (ref 150–450)
POTASSIUM SERPL-SCNC: 4 MMOL/L (ref 3.4–5.3)
PROT SERPL-MCNC: 7.8 G/DL (ref 6.8–8.8)
RBC # BLD AUTO: 4.73 10E12/L (ref 4.4–5.9)
SODIUM SERPL-SCNC: 137 MMOL/L (ref 133–144)
TRIGL SERPL-MCNC: 82 MG/DL
WBC # BLD AUTO: 4.5 10E9/L (ref 4–11)

## 2021-04-27 PROCEDURE — 36415 COLL VENOUS BLD VENIPUNCTURE: CPT | Performed by: PATHOLOGY

## 2021-04-27 PROCEDURE — 80053 COMPREHEN METABOLIC PANEL: CPT | Performed by: PATHOLOGY

## 2021-04-27 PROCEDURE — 85027 COMPLETE CBC AUTOMATED: CPT | Performed by: PATHOLOGY

## 2021-04-27 PROCEDURE — 80061 LIPID PANEL: CPT | Performed by: PATHOLOGY

## 2021-04-27 PROCEDURE — 83735 ASSAY OF MAGNESIUM: CPT | Performed by: PATHOLOGY

## 2021-04-28 ENCOUNTER — VIRTUAL VISIT (OUTPATIENT)
Dept: FAMILY MEDICINE | Facility: CLINIC | Age: 51
End: 2021-04-28
Payer: COMMERCIAL

## 2021-04-28 DIAGNOSIS — K52.9 COLITIS: ICD-10-CM

## 2021-04-28 DIAGNOSIS — K86.2 PANCREATIC CYST: ICD-10-CM

## 2021-04-28 DIAGNOSIS — R10.84 ABDOMINAL PAIN, GENERALIZED: Primary | ICD-10-CM

## 2021-04-28 PROCEDURE — 99213 OFFICE O/P EST LOW 20 MIN: CPT | Mod: TEL | Performed by: NURSE PRACTITIONER

## 2021-04-28 ASSESSMENT — PAIN SCALES - GENERAL: PAINLEVEL: NO PAIN (0)

## 2021-04-28 NOTE — PROGRESS NOTES
Ming is a 50 year old who is being evaluated via a billable telephone visit.      What phone number would you like to be contacted at? [phone]  How would you like to obtain your AVS? Lucina    Jeet Lai is a 50 year old who presents for follow up on colitis, lab results, imaging results and next steps.     HPI   Abdominal pain has resolved.   Colitis:diearrhea has resolved. He is eating a regular diet. He is abstaining from alcohol.    Pancreatic Cyst: Showed up on recent CT, recommendation is to repeat CT in 6 months.     Review of Systems   Constitutional, HEENT, cardiovascular, pulmonary, gi and gu systems are negative, except as otherwise noted.      Objective    Vitals - Patient Reported  Pain Score: No Pain (0)    Physical Exam:   Healthy, alert and no distress  PSYCH: Alert and oriented times 3; coherent speech, normal   rate and volume, able to articulate logical thoughts, able   to abstract reason, no tangential thoughts, no hallucinations   or delusions  His affect is normal  RESP: No cough, no audible wheezing, able to talk in full sentences  Remainder of exam unable to be completed due to telephone visits    Recent labs:   Component      Latest Ref Rng & Units 4/27/2021   Sodium      133 - 144 mmol/L 137   Potassium      3.4 - 5.3 mmol/L 4.0   Chloride      94 - 109 mmol/L 101   Carbon Dioxide      20 - 32 mmol/L 30   Anion Gap      3 - 14 mmol/L 6   Glucose      70 - 99 mg/dL 90   Urea Nitrogen      7 - 30 mg/dL 6 (L)   Creatinine      0.66 - 1.25 mg/dL 0.72   GFR Estimate      >60 mL/min/1.73:m2 >90   GFR Estimate If Black      >60 mL/min/1.73:m2 >90   Calcium      8.5 - 10.1 mg/dL 9.2   Bilirubin Total      0.2 - 1.3 mg/dL 0.6   Albumin      3.4 - 5.0 g/dL 3.1 (L)   Protein Total      6.8 - 8.8 g/dL 7.8   Alkaline Phosphatase      40 - 150 U/L 90   ALT      0 - 70 U/L 71 (H)   AST      0 - 45 U/L 77 (H)   WBC      4.0 - 11.0 10e9/L 4.5   RBC Count      4.4 - 5.9 10e12/L 4.73    Hemoglobin      13.3 - 17.7 g/dL 14.9   Hematocrit      40.0 - 53.0 % 45.7   MCV      78 - 100 fl 97   MCH      26.5 - 33.0 pg 31.5   MCHC      31.5 - 36.5 g/dL 32.6   RDW      10.0 - 15.0 % 13.8   Platelet Count      150 - 450 10e9/L 274   Cholesterol      <200 mg/dL 197   Triglycerides      <150 mg/dL 82   HDL Cholesterol      >39 mg/dL 56   LDL Cholesterol Calculated      <100 mg/dL 124 (H)   Non HDL Cholesterol      <130 mg/dL 141 (H)   Magnesium      1.6 - 2.3 mg/dL 2.2     Phone call duration: 10 minutes  Assessment & Plan     Abdominal pain, generalized      Colitis      Pancreatic cyst    - CT Abdomen/Pelvis w/o & w contrast; Future    Return if symptoms worsen or fail to improve.    Labs reviewed in detail, they have improved greatly. First, please return for worsening or persisting symptoms. Abstain from alcohol. Complete Carafate.  Next, please follow up on your overall status in 4 weeks. Finally, have a repeat CT in 6 months. He verbalizes understanding of the plan.     Kianna Peguero, APRN, CNP

## 2021-04-28 NOTE — PATIENT INSTRUCTIONS
[facility] Medication Refill Request Information:  * Please contact your pharmacy regarding ANY request for medication refills.  ** NP Clinic Prescription Fax = [phone]  * Please allow 3 business days for routine medication refills.  * Please allow 5 business days for controlled substance medication refills.     [facility] Test Result notification information:  *You will be notified with in 7-10 days of your appointment day regarding the results of your test.  If you are on MyChart you will be notified as soon as the provider has reviewed the results and signed off on them.    [facility]: [phone]     If you have questions regarding Covid-19 and the Covid-19 vaccine, please visit this website.    [email or web address]

## 2021-04-28 NOTE — NURSING NOTE
50 year old  Chief Complaint   Patient presents with     Recheck Medication     Follow up.       There were no vitals taken for this visit. There is no height or weight on file to calculate BMI.  BP completed using cuff size:      ZOYA Ceja  April 28, 2021 11:07 AM

## 2021-04-29 ENCOUNTER — TELEPHONE (OUTPATIENT)
Dept: GASTROENTEROLOGY | Facility: CLINIC | Age: 51
End: 2021-04-29

## 2021-04-29 ASSESSMENT — ENCOUNTER SYMPTOMS
FATIGUE: 0
CHILLS: 0
FEVER: 0
DIARRHEA: 1
ABDOMINAL PAIN: 1

## 2021-04-29 NOTE — TELEPHONE ENCOUNTER
Instructions, policy,MAC sedation plan reviewed. Advised patient to have someone stay with them for 24 hours post exam. Patient currently in [place] and will have test done there. He stated he had the fax number to send result to the [facility].

## 2021-05-04 ENCOUNTER — TELEPHONE (OUTPATIENT)
Dept: GASTROENTEROLOGY | Facility: CLINIC | Age: 51
End: 2021-05-04

## 2021-05-04 DIAGNOSIS — Z11.59 ENCOUNTER FOR SCREENING FOR OTHER VIRAL DISEASES: ICD-10-CM

## 2021-05-04 NOTE — TELEPHONE ENCOUNTER
Screening Questions  1. What insurance is in the chart? [employer]    2.  Ordering/Referring Provider: Kianna Peguero NP    3. BMI 25.1    4. Are you on daily home oxygen? no    5. Do you have a history of difficult airway? no    6. Have you had a heart, lung, or liver transplant? no    7. Have you had a stroke or Transient ischemic atttack (TIA) within 3 months? no    8. In the past year, have you had any heart related issues including cardiomyopathy or a MI within 6 months, that required cardiac stenting or other implantable devices? no    9. What type of implantable device do you have (any pacemaker, defib, LVAD)? no    10. Do you take nitroglycerin? If yes, how often? no    11. Are you currently taking any blood thinners?no    12. Are you a diabetic? no    13. (Females) Are you currently pregnant?   If yes, how many weeks?    14. Have you had a procedure in the past that was difficult to tolerate with conscious sedation? Any allergies to Fentanyl or Versed no    15. Are you taking any scheduled prescription narcotics more than once daily? no    16. Do you have any chemical dependencies such as alcohol, street drugs, or methadone? no    17. Do you have any history of post-traumatic stress syndrome or mental health issues? no    18. Do you transfer independently? yes    19.  Do you have any issues with constipation?     Scheduling Details    Procedure Scheduled: colonoscopy   Provider/Surgeon: Dr. Melo  Date of Procedure: 5/15/2021  Location[facility]  Caller (Please ask for phone number if not scheduled by patient): Ming Uribe      Sedation Type: MAC  Conscious Sedation- Needs  for 6 hours after the procedure  MAC/General-Needs  for 24 hours after procedure    Pre-op Required at [facility] and OR for  MAC sedation: no  (if yes advise patient they will need a pre-op prior to procedure)      Is patient on blood thinners? -no (If yes- inform patient to follow up with PCP or provider for follow up  instructions)     Informed patient they will need an adult  yes  Cannot take any type of public or medical transportation alone    Informed Patient of COVID Test Requirement yes and scheduled    Preferred Pharmacy for Pre Prescription on chart    Confirmed Nurse will call to complete assessment yes    Additional comments: no

## 2021-05-04 NOTE — TELEPHONE ENCOUNTER
Patient taking any blood thinners ? No     Heart disease ? No     Lung disease ? asthma     Sleep apnea ? No     Diabetic ? No     Kidney disease ? No     Electronic implanted medical devices ?  No     PTSD ? No     Prep instructions reviewed with patient ? Miralax/mag citrate prep    :  verified.  Pt instructed to have someone stay with pt for 24 hours post procedure     Indication for procedure : colitis, abdominal pain     Referring provider : Kianna Peguero NP      Arrival Time : 1230

## 2021-05-10 ENCOUNTER — TELEPHONE (OUTPATIENT)
Dept: GASTROENTEROLOGY | Facility: OUTPATIENT CENTER | Age: 51
End: 2021-05-10

## 2021-05-13 ENCOUNTER — TELEPHONE (OUTPATIENT)
Dept: FAMILY MEDICINE | Facility: CLINIC | Age: 51
End: 2021-05-13

## 2021-05-13 DIAGNOSIS — J45.20 MILD INTERMITTENT ASTHMA WITHOUT COMPLICATION: ICD-10-CM

## 2021-05-13 DIAGNOSIS — Z11.59 ENCOUNTER FOR SCREENING FOR OTHER VIRAL DISEASES: ICD-10-CM

## 2021-05-13 LAB
LABORATORY COMMENT REPORT: NORMAL
SARS-COV-2 RNA RESP QL NAA+PROBE: NEGATIVE
SARS-COV-2 RNA RESP QL NAA+PROBE: NORMAL
SPECIMEN SOURCE: NORMAL
SPECIMEN SOURCE: NORMAL

## 2021-05-13 PROCEDURE — U0005 INFEC AGEN DETEC AMPLI PROBE: HCPCS | Mod: 90 | Performed by: PATHOLOGY

## 2021-05-13 PROCEDURE — U0003 INFECTIOUS AGENT DETECTION BY NUCLEIC ACID (DNA OR RNA); SEVERE ACUTE RESPIRATORY SYNDROME CORONAVIRUS 2 (SARS-COV-2) (CORONAVIRUS DISEASE [COVID-19]), AMPLIFIED PROBE TECHNIQUE, MAKING USE OF HIGH THROUGHPUT TECHNOLOGIES AS DESCRIBED BY CMS-2020-01-R: HCPCS | Mod: 90 | Performed by: PATHOLOGY

## 2021-05-13 RX ORDER — ALBUTEROL SULFATE 90 UG/1
1-2 AEROSOL, METERED RESPIRATORY (INHALATION) EVERY 4 HOURS PRN
Qty: 1 G | Refills: 11 | Status: SHIPPED | OUTPATIENT
Start: 2021-05-13

## 2021-05-14 ENCOUNTER — IMMUNIZATION (OUTPATIENT)
Dept: NURSING | Facility: CLINIC | Age: 51
End: 2021-05-14
Attending: INTERNAL MEDICINE
Payer: COMMERCIAL

## 2021-05-14 PROCEDURE — 91300 PR COVID VAC PFIZER DIL RECON 30 MCG/0.3 ML IM: CPT

## 2021-05-14 PROCEDURE — 0002A PR COVID VAC PFIZER DIL RECON 30 MCG/0.3 ML IM: CPT

## 2021-06-01 ENCOUNTER — VIRTUAL VISIT (OUTPATIENT)
Dept: FAMILY MEDICINE | Facility: CLINIC | Age: 51
End: 2021-06-01
Payer: COMMERCIAL

## 2021-06-01 DIAGNOSIS — J45.41 MODERATE PERSISTENT ASTHMA WITH ACUTE EXACERBATION: Primary | ICD-10-CM

## 2021-06-01 DIAGNOSIS — F41.8 MIXED ANXIETY AND DEPRESSIVE DISORDER: ICD-10-CM

## 2021-06-01 DIAGNOSIS — J45.20 MILD INTERMITTENT ASTHMA WITHOUT COMPLICATION: ICD-10-CM

## 2021-06-01 RX ORDER — MONTELUKAST SODIUM 10 MG/1
10 TABLET ORAL AT BEDTIME
Qty: 30 TABLET | Refills: 1 | Status: SHIPPED | OUTPATIENT
Start: 2021-06-01

## 2021-06-01 ASSESSMENT — PATIENT HEALTH QUESTIONNAIRE - PHQ9
5. POOR APPETITE OR OVEREATING: NEARLY EVERY DAY
SUM OF ALL RESPONSES TO PHQ QUESTIONS 1-9: 19

## 2021-06-01 ASSESSMENT — ASTHMA QUESTIONNAIRES
ACUTE_EXACERBATION_TODAY: YES
QUESTION_4 LAST FOUR WEEKS HOW OFTEN HAVE YOU USED YOUR RESCUE INHALER OR NEBULIZER MEDICATION (SUCH AS ALBUTEROL): THREE OR MORE TIMES PER DAY
ACT_TOTALSCORE: 10
QUESTION_2 LAST FOUR WEEKS HOW OFTEN HAVE YOU HAD SHORTNESS OF BREATH: MORE THAN ONCE A DAY
QUESTION_1 LAST FOUR WEEKS HOW MUCH OF THE TIME DID YOUR ASTHMA KEEP YOU FROM GETTING AS MUCH DONE AT WORK, SCHOOL OR AT HOME: A LITTLE OF THE TIME
QUESTION_5 LAST FOUR WEEKS HOW WOULD YOU RATE YOUR ASTHMA CONTROL: POORLY CONTROLLED
QUESTION_3 LAST FOUR WEEKS HOW OFTEN DID YOUR ASTHMA SYMPTOMS (WHEEZING, COUGHING, SHORTNESS OF BREATH, CHEST TIGHTNESS OR PAIN) WAKE YOU UP AT NIGHT OR EARLIER THAN USUAL IN THE MORNING: TWO OR THREE NIGHTS A WEEK

## 2021-06-01 ASSESSMENT — ANXIETY QUESTIONNAIRES
1. FEELING NERVOUS, ANXIOUS, OR ON EDGE: NEARLY EVERY DAY
7. FEELING AFRAID AS IF SOMETHING AWFUL MIGHT HAPPEN: SEVERAL DAYS
GAD7 TOTAL SCORE: 16
5. BEING SO RESTLESS THAT IT IS HARD TO SIT STILL: MORE THAN HALF THE DAYS
3. WORRYING TOO MUCH ABOUT DIFFERENT THINGS: NEARLY EVERY DAY
2. NOT BEING ABLE TO STOP OR CONTROL WORRYING: NEARLY EVERY DAY
6. BECOMING EASILY ANNOYED OR IRRITABLE: SEVERAL DAYS
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
## 2021-06-02 ASSESSMENT — ANXIETY QUESTIONNAIRES: GAD7 TOTAL SCORE: 16

## 2021-06-02 ASSESSMENT — ASTHMA QUESTIONNAIRES: ACT_TOTALSCORE: 10

## 2021-06-10 ENCOUNTER — VIRTUAL VISIT (OUTPATIENT)
Dept: FAMILY MEDICINE | Facility: CLINIC | Age: 51
End: 2021-06-10
Payer: COMMERCIAL

## 2021-06-10 DIAGNOSIS — G47.09 OTHER INSOMNIA: ICD-10-CM

## 2021-06-10 DIAGNOSIS — F41.1 GENERALIZED ANXIETY DISORDER: Primary | ICD-10-CM

## 2021-06-10 DIAGNOSIS — F32.1 MODERATE MAJOR DEPRESSION (H): ICD-10-CM

## 2021-06-10 PROCEDURE — 99214 OFFICE O/P EST MOD 30 MIN: CPT | Mod: GT | Performed by: NURSE PRACTITIONER

## 2021-06-10 RX ORDER — CLONAZEPAM 0.5 MG/1
0.5 TABLET ORAL DAILY PRN
Qty: 30 TABLET | Refills: 0 | Status: SHIPPED | OUTPATIENT
Start: 2021-06-10 | End: 2021-07-01

## 2021-06-10 ASSESSMENT — PAIN SCALES - GENERAL: PAINLEVEL: NO PAIN (0)

## 2021-06-10 ASSESSMENT — ANXIETY QUESTIONNAIRES
GAD7 TOTAL SCORE: 9
6. BECOMING EASILY ANNOYED OR IRRITABLE: SEVERAL DAYS
GAD7 TOTAL SCORE: 9
1. FEELING NERVOUS, ANXIOUS, OR ON EDGE: MORE THAN HALF THE DAYS
3. WORRYING TOO MUCH ABOUT DIFFERENT THINGS: MORE THAN HALF THE DAYS
GAD7 TOTAL SCORE: 9
2. NOT BEING ABLE TO STOP OR CONTROL WORRYING: MORE THAN HALF THE DAYS
7. FEELING AFRAID AS IF SOMETHING AWFUL MIGHT HAPPEN: NOT AT ALL
7. FEELING AFRAID AS IF SOMETHING AWFUL MIGHT HAPPEN: NOT AT ALL
4. TROUBLE RELAXING: SEVERAL DAYS
5. BEING SO RESTLESS THAT IT IS HARD TO SIT STILL: SEVERAL DAYS

## 2021-06-10 ASSESSMENT — PATIENT HEALTH QUESTIONNAIRE - PHQ9
SUM OF ALL RESPONSES TO PHQ QUESTIONS 1-9: 5
SUM OF ALL RESPONSES TO PHQ QUESTIONS 1-9: 5

## 2021-06-10 NOTE — NURSING NOTE
50 year old  Chief Complaint   Patient presents with     Refill Request     Medication refill.     Mariela Solano, RMA  Lesa 10, 2021 9:01 AM

## 2021-06-10 NOTE — PROGRESS NOTES
"Ming is a 50 year old who is being evaluated via a billable video visit.      How would you like to obtain your AVS? MyChart  If the video visit is dropped, the invitation should be resent by: Other e-mail: Sidra  Will anyone else be joining your video visit? No    Video Start Time: 0915    Subjective   Ming is a 50 year old who presents for follow up on depression and anxiety.     HPI   Anxiety/Depression/Insomnia: Ming presents virtually to discuss anxiety and depression. He was seen by my colleague, Denise VERA on 06/01 and started on Zoloft. He is taking this daily without problems at this time. He had seen another colleague of andreea, Maribel VERA on 03/25/2021 and was prescribed Lorazepam for anxiety and insomnia. He has been taking this sparingly, he has 4 left. He does feel very groggy the day after he takes this. He would like to have a refill so that he has it on hand for panicky type episodes that he experiences. It is noted that Ming has a history of alcoholism. He is currently staying with his girlfriend and reports that they are \"eating right, exercising and staying away from alcohol.\"     Review of Systems   Constitutional, HEENT, cardiovascular, pulmonary, gi and gu systems are negative, except as otherwise noted.      Objective    Vitals - Patient Reported  Pain Score: No Pain (0)    Physical Exam   GENERAL: Healthy, alert and no distress  EYES: Eyes grossly normal to inspection.  No discharge or erythema, or obvious scleral/conjunctival abnormalities.  RESP: No audible wheeze, cough, or visible cyanosis.  No visible retractions or increased work of breathing.    SKIN: Visible skin clear. No significant rash, abnormal pigmentation or lesions.  NEURO: Cranial nerves grossly intact.  Mentation and speech appropriate for age.  PSYCH: Mentation appears normal, affect normal/bright, judgement and insight intact, normal speech and appearance well-groomed.    Depression and Anxiety Scores:   ETHAN-7 SCORE " 3/25/2021 6/1/2021 6/10/2021   Total Score - - 9 (mild anxiety)   Total Score 15 16 9     PHQ 3/25/2021 6/1/2021 6/10/2021   PHQ-9 Total Score 13 19 5   Q9: Thoughts of better off dead/self-harm past 2 weeks Not at all Several days Not at all     Video-Visit Details    Type of service:  Video Visit    Video End Time:0932    Originating Location (pt. Location): Home    Distant Location (provider location):  [facility] [place]     Platform used for Video Visit: Well   Assessment & Plan     Generalized anxiety disorder    - clonazePAM (KLONOPIN) 0.5 MG tablet; Take 1 tablet (0.5 mg) by mouth daily as needed for anxiety or sleep    Moderate major depression (H)      Other insomnia    - clonazePAM (KLONOPIN) 0.5 MG tablet; Take 1 tablet (0.5 mg) by mouth daily as needed for anxiety or sleep    Return in about 3 weeks (around 7/1/2021) for using a video visit.    Patient instructions: First, continue Zoloft as directed. Next, transition from Lorazepam to Clonazepam at a smaller dose due to the fact that you were feeling groggy and due to the alternate medication being less habit forming. Next, take the Clonazepam as needed only. Next, follow up in 3 weeks. Next, schedule an appt with [facility] for counseling (referral is in for [facility] as well, however, attempt to schedule soonest appt.) He verbalizes understanding of the plan.    Kianna Peguero, APRN, CNP

## 2021-06-10 NOTE — PATIENT INSTRUCTIONS
[facility] Medication Refill Request Information:  * Please contact your pharmacy regarding ANY request for medication refills.  ** NP Clinic Prescription Fax = [phone]  * Please allow 3 business days for routine medication refills.  * Please allow 5 business days for controlled substance medication refills.     [facility] Test Result notification information:  *You will be notified with in 7-10 days of your appointment day regarding the results of your test.  If you are on MyChart you will be notified as soon as the provider has reviewed the results and signed off on them.    [facility]: [phone]     If you have questions regarding Covid-19 and the Covid-19 vaccine, please visit this website.    [email or web address]

## 2021-06-11 ASSESSMENT — ANXIETY QUESTIONNAIRES: GAD7 TOTAL SCORE: 9

## 2021-06-11 ASSESSMENT — PATIENT HEALTH QUESTIONNAIRE - PHQ9: SUM OF ALL RESPONSES TO PHQ QUESTIONS 1-9: 5

## 2021-07-01 ENCOUNTER — VIRTUAL VISIT (OUTPATIENT)
Dept: FAMILY MEDICINE | Facility: CLINIC | Age: 51
End: 2021-07-01
Payer: COMMERCIAL

## 2021-07-01 DIAGNOSIS — F41.8 MIXED ANXIETY AND DEPRESSIVE DISORDER: ICD-10-CM

## 2021-07-01 DIAGNOSIS — F32.1 MODERATE MAJOR DEPRESSION (H): ICD-10-CM

## 2021-07-01 DIAGNOSIS — J45.20 MILD INTERMITTENT ASTHMA WITHOUT COMPLICATION: ICD-10-CM

## 2021-07-01 DIAGNOSIS — F41.1 GAD (GENERALIZED ANXIETY DISORDER): Primary | ICD-10-CM

## 2021-07-01 DIAGNOSIS — G47.09 OTHER INSOMNIA: ICD-10-CM

## 2021-07-01 PROCEDURE — 99214 OFFICE O/P EST MOD 30 MIN: CPT | Mod: GT | Performed by: NURSE PRACTITIONER

## 2021-07-01 PROCEDURE — 96127 BRIEF EMOTIONAL/BEHAV ASSMT: CPT | Mod: GT | Performed by: NURSE PRACTITIONER

## 2021-07-01 RX ORDER — CLONAZEPAM 0.5 MG/1
0.5 TABLET ORAL
Qty: 30 TABLET | Refills: 0 | Status: SHIPPED | OUTPATIENT
Start: 2021-07-08 | End: 2021-08-31

## 2021-07-01 RX ORDER — SERTRALINE HYDROCHLORIDE 100 MG/1
100 TABLET, FILM COATED ORAL DAILY
Qty: 60 TABLET | Refills: 0 | Status: ON HOLD | OUTPATIENT
Start: 2021-07-01 | End: 2021-08-26

## 2021-07-01 ASSESSMENT — PATIENT HEALTH QUESTIONNAIRE - PHQ9
SUM OF ALL RESPONSES TO PHQ QUESTIONS 1-9: 16
SUM OF ALL RESPONSES TO PHQ QUESTIONS 1-9: 16
10. IF YOU CHECKED OFF ANY PROBLEMS, HOW DIFFICULT HAVE THESE PROBLEMS MADE IT FOR YOU TO DO YOUR WORK, TAKE CARE OF THINGS AT HOME, OR GET ALONG WITH OTHER PEOPLE: VERY DIFFICULT

## 2021-07-01 ASSESSMENT — PAIN SCALES - GENERAL: PAINLEVEL: MODERATE PAIN (5)

## 2021-07-01 NOTE — PATIENT INSTRUCTIONS
[facility] Medication Refill Request Information:  * Please contact your pharmacy regarding ANY request for medication refills.  ** [facility] Clinic Prescription Fax = [phone]  * Please allow 3 business days for routine medication refills.  * Please allow 5 business days for controlled substance medication refills.     [facility] Test Result notification information:  *You will be notified with in 7-10 days of your appointment day regarding the results of your test.  If you are on MyChart you will be notified as soon as the provider has reviewed the results and signed off on them.    [facility]: [phone]     If you have questions regarding Covid-19 and the Covid-19 vaccine, please visit this website.    [email or web address]

## 2021-07-01 NOTE — PROGRESS NOTES
Ming is a 50 year old who is being evaluated via a billable video visit.      How would you like to obtain your AVS? Sidra  If the video visit is dropped, the invitation should be resent by: Other e-mail: Sidra  Will anyone else be joining your video visit? No    Video Start Time: 1026    Subjective   Ming is a 50 year old who presents for follow-up on anxiety, depression, insomnia, and needs to have inhalers refilled.  HPI   Anxiety/depression/insomnia; Ming is doing fair, he has recently returned to the [place] area after visiting with his girlfriend for a few weeks out of state.  He does feel a bit more stress now that he has returned.  He is taking Zoloft, 50 mg daily.  He does feel that this is helpful however he does think that an increase may be warranted.  He also is taking clonazepam 0.5 mg daily at bedtime for sleep.  He tends to feel more anxious in the evening.  No other concerns.  It is noted that he needs to have his Advair and albuterol inhalers refilled.  He has not been using his Advair consistently, however moving forward he will make sure that he does this.    Review of Systems   Constitutional, HEENT, cardiovascular, pulmonary, gi and gu systems are negative, except as otherwise noted.      Objective    Vitals - Patient Reported  Pain Score: Moderate Pain (5)    Physical Exam   GENERAL: Healthy, alert and no distress  EYES: Eyes grossly normal to inspection.  No discharge or erythema, or obvious scleral/conjunctival abnormalities.  RESP: No audible wheeze, cough, or visible cyanosis.  No visible retractions or increased work of breathing.    SKIN: Visible skin clear. No significant rash, abnormal pigmentation or lesions.  NEURO: Cranial nerves grossly intact.  Mentation and speech appropriate for age.  PSYCH: Mentation appears normal, affect normal/bright, judgement and insight intact, normal speech and appearance well-groomed.    Anxiety and Depression screens:  ETHAN-7 SCORE  3/25/2021 6/1/2021 6/10/2021   Total Score - - 9 (mild anxiety)   Total Score 15 16 9       PHQ 6/1/2021 6/10/2021 7/1/2021   PHQ-9 Total Score 19 5 16   Q9: Thoughts of better off dead/self-harm past 2 weeks Several days Not at all Not at all     Video-Visit Details    Type of service:  Video Visit    Video End Time:1039    Originating Location (pt. Location): Home    Distant Location (provider location):  Provider's home    Platform used for Video Visit: [facility]     Assessment & Plan     ETHAN (generalized anxiety disorder)    - sertraline (ZOLOFT) 100 MG tablet; Take 1 tablet (100 mg) by mouth daily  - clonazePAM (KLONOPIN) 0.5 MG tablet; Take 1 tablet (0.5 mg) by mouth nightly as needed for anxiety or sleep    Moderate major depression (H)    - sertraline (ZOLOFT) 100 MG tablet; Take 1 tablet (100 mg) by mouth daily    Other insomnia    - clonazePAM (KLONOPIN) 0.5 MG tablet; Take 1 tablet (0.5 mg) by mouth nightly as needed for anxiety or sleep    Mild intermittent asthma without complication    - fluticasone-salmeterol (ADVAIR) 250-50 MCG/DOSE inhaler; Inhale 1 puff into the lungs 2 times daily     Depression Screening Follow Up    PHQ 7/1/2021   PHQ-9 Total Score 16   Q9: Thoughts of better off dead/self-harm past 2 weeks Not at all     Return in about 4 weeks (around 7/29/2021) for using a video visit.    First, continue Zoloft at 50 mg for 4 more days or until you have been taking that dose for 7 days.  Next, increase Zoloft to 100 mg daily.  Next, continue to take clonazepam 0.5 mg daily at bedtime as needed for anxiety and sleep.  Next, follow-up on your overall status in 4 weeks.  Finally, call with any worsening or persisting symptoms.  He verbalizes understanding of the plan.  Kianna Peguero, APRN, CNP

## 2021-07-01 NOTE — NURSING NOTE
Chief Complaint   Patient presents with     Depression     Follow up on depression and anxiety.     Mariela Solano, ZOYA 10:16 AM  7/1/2021

## 2021-07-12 DIAGNOSIS — R10.84 ABDOMINAL PAIN, GENERALIZED: ICD-10-CM

## 2021-07-12 DIAGNOSIS — K62.5 RECTAL BLEEDING: ICD-10-CM

## 2021-07-12 DIAGNOSIS — G47.09 OTHER INSOMNIA: ICD-10-CM

## 2021-07-12 DIAGNOSIS — F41.1 GAD (GENERALIZED ANXIETY DISORDER): ICD-10-CM

## 2021-07-13 RX ORDER — SUCRALFATE ORAL 1 G/10ML
1 SUSPENSION ORAL 4 TIMES DAILY
Qty: 560 ML | Refills: 0 | OUTPATIENT
Start: 2021-07-13

## 2021-07-13 NOTE — TELEPHONE ENCOUNTER
sucralfate (CARAFATE) 1 GM/10ML suspension  Take 10 mLs (1 g) by mouth 4 times daily for 14 days   Last Written Prescription Date:  21  Last Fill Quantity: 560,   # refills: 0  Last Office Visit : 2021  Future Office visit:  None    Routing refill request to provider for review/approval because:  Drug not active on patient's medication list( ,initially prescribed for 14 days)

## 2021-08-23 ENCOUNTER — HOSPITAL ENCOUNTER (INPATIENT)
Facility: CLINIC | Age: 51
LOS: 4 days | Discharge: HOME | DRG: 183 | End: 2021-08-28
Attending: EMERGENCY MEDICINE | Admitting: SURGERY
Payer: COMMERCIAL

## 2021-08-23 ENCOUNTER — APPOINTMENT (OUTPATIENT)
Dept: CT IMAGING | Facility: CLINIC | Age: 51
DRG: 183 | End: 2021-08-23
Attending: EMERGENCY MEDICINE
Payer: COMMERCIAL

## 2021-08-23 ENCOUNTER — NURSE TRIAGE (OUTPATIENT)
Dept: NURSING | Facility: CLINIC | Age: 51
End: 2021-08-23

## 2021-08-23 ENCOUNTER — ANCILLARY PROCEDURE (OUTPATIENT)
Dept: ULTRASOUND IMAGING | Facility: CLINIC | Age: 51
End: 2021-08-23
Attending: EMERGENCY MEDICINE
Payer: COMMERCIAL

## 2021-08-23 DIAGNOSIS — S80.10XA HEMATOMA OF LOWER EXTREMITY, UNSPECIFIED LATERALITY, INITIAL ENCOUNTER: ICD-10-CM
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
## 2021-08-23 DIAGNOSIS — S22.41XA CLOSED FRACTURE OF MULTIPLE RIBS OF RIGHT SIDE, INITIAL ENCOUNTER: ICD-10-CM

## 2021-08-23 DIAGNOSIS — K70.10 ALCOHOLIC HEPATITIS WITHOUT ASCITES (H): Primary | ICD-10-CM

## 2021-08-23 DIAGNOSIS — J18.9 COMMUNITY ACQUIRED PNEUMONIA OF LEFT LOWER LOBE OF LUNG: ICD-10-CM

## 2021-08-23 DIAGNOSIS — Z11.52 ENCOUNTER FOR SCREENING LABORATORY TESTING FOR COVID-19 VIRUS: ICD-10-CM

## 2021-08-23 DIAGNOSIS — F10.939 ALCOHOL WITHDRAWAL SYNDROME WITH COMPLICATION (H): ICD-10-CM

## 2021-08-23 LAB
ALBUMIN SERPL-MCNC: 3.2 G/DL (ref 3.4–5)
ALP SERPL-CCNC: 89 U/L (ref 40–150)
ALT SERPL W P-5'-P-CCNC: 84 U/L (ref 0–70)
ANION GAP SERPL CALCULATED.3IONS-SCNC: 12 MMOL/L (ref 3–14)
AST SERPL W P-5'-P-CCNC: 188 U/L (ref 0–45)
BASOPHILS # BLD AUTO: 0 10E3/UL (ref 0–0.2)
BASOPHILS NFR BLD AUTO: 0 %
BILIRUB DIRECT SERPL-MCNC: 0.7 MG/DL (ref 0–0.2)
BILIRUB SERPL-MCNC: 2.1 MG/DL (ref 0.2–1.3)
BUN SERPL-MCNC: 24 MG/DL (ref 7–30)
CALCIUM SERPL-MCNC: 9.2 MG/DL (ref 8.5–10.1)
CHLORIDE BLD-SCNC: 89 MMOL/L (ref 94–109)
CO2 SERPL-SCNC: 26 MMOL/L (ref 20–32)
CREAT SERPL-MCNC: 1.18 MG/DL (ref 0.66–1.25)
EOSINOPHIL # BLD AUTO: 0.1 10E3/UL (ref 0–0.7)
EOSINOPHIL NFR BLD AUTO: 0 %
ERYTHROCYTE [DISTWIDTH] IN BLOOD BY AUTOMATED COUNT: 12.8 % (ref 10–15)
ETHANOL SERPL-MCNC: <0.01 G/DL
GFR SERPL CREATININE-BSD FRML MDRD: 72 ML/MIN/1.73M2
GLUCOSE BLD-MCNC: 107 MG/DL (ref 70–99)
HCT VFR BLD AUTO: 34.3 % (ref 40–53)
HGB BLD-MCNC: 12.1 G/DL (ref 13.3–17.7)
HOLD SPECIMEN: NORMAL
IMM GRANULOCYTES # BLD: 0.1 10E3/UL
IMM GRANULOCYTES NFR BLD: 1 %
LIPASE SERPL-CCNC: 166 U/L (ref 73–393)
LYMPHOCYTES # BLD AUTO: 0.7 10E3/UL (ref 0.8–5.3)
LYMPHOCYTES NFR BLD AUTO: 5 %
MCH RBC QN AUTO: 33.2 PG (ref 26.5–33)
MCHC RBC AUTO-ENTMCNC: 35.3 G/DL (ref 31.5–36.5)
MCV RBC AUTO: 94 FL (ref 78–100)
MONOCYTES # BLD AUTO: 1.6 10E3/UL (ref 0–1.3)
MONOCYTES NFR BLD AUTO: 13 %
NEUTROPHILS # BLD AUTO: 10 10E3/UL (ref 1.6–8.3)
NEUTROPHILS NFR BLD AUTO: 81 %
NRBC # BLD AUTO: 0 10E3/UL
NRBC BLD AUTO-RTO: 0 /100
PLATELET # BLD AUTO: 153 10E3/UL (ref 150–450)
POTASSIUM BLD-SCNC: 3.6 MMOL/L (ref 3.4–5.3)
PROT SERPL-MCNC: 7.9 G/DL (ref 6.8–8.8)
RBC # BLD AUTO: 3.64 10E6/UL (ref 4.4–5.9)
SARS-COV-2 RNA RESP QL NAA+PROBE: NEGATIVE
SODIUM SERPL-SCNC: 127 MMOL/L (ref 133–144)
WBC # BLD AUTO: 12.5 10E3/UL (ref 4–11)

## 2021-08-23 PROCEDURE — 74177 CT ABD & PELVIS W/CONTRAST: CPT | Mod: 26 | Performed by: RADIOLOGY

## 2021-08-23 PROCEDURE — 36415 COLL VENOUS BLD VENIPUNCTURE: CPT | Performed by: EMERGENCY MEDICINE

## 2021-08-23 PROCEDURE — 70450 CT HEAD/BRAIN W/O DYE: CPT

## 2021-08-23 PROCEDURE — 82248 BILIRUBIN DIRECT: CPT | Performed by: EMERGENCY MEDICINE

## 2021-08-23 PROCEDURE — 250N000011 HC RX IP 250 OP 636: Performed by: EMERGENCY MEDICINE

## 2021-08-23 PROCEDURE — 85025 COMPLETE CBC W/AUTO DIFF WBC: CPT | Performed by: EMERGENCY MEDICINE

## 2021-08-23 PROCEDURE — 682N000003 HC TRAUMA EVALUATION W/O CC LEVEL II: Performed by: EMERGENCY MEDICINE

## 2021-08-23 PROCEDURE — 80053 COMPREHEN METABOLIC PANEL: CPT | Performed by: EMERGENCY MEDICINE

## 2021-08-23 PROCEDURE — 74177 CT ABD & PELVIS W/CONTRAST: CPT

## 2021-08-23 PROCEDURE — 96375 TX/PRO/DX INJ NEW DRUG ADDON: CPT | Performed by: EMERGENCY MEDICINE

## 2021-08-23 PROCEDURE — 83690 ASSAY OF LIPASE: CPT | Performed by: EMERGENCY MEDICINE

## 2021-08-23 PROCEDURE — 71260 CT THORAX DX C+: CPT | Mod: 26 | Performed by: RADIOLOGY

## 2021-08-23 PROCEDURE — 70450 CT HEAD/BRAIN W/O DYE: CPT | Mod: 26 | Performed by: RADIOLOGY

## 2021-08-23 PROCEDURE — C9803 HOPD COVID-19 SPEC COLLECT: HCPCS | Performed by: EMERGENCY MEDICINE

## 2021-08-23 PROCEDURE — 80048 BASIC METABOLIC PNL TOTAL CA: CPT | Performed by: EMERGENCY MEDICINE

## 2021-08-23 PROCEDURE — 82077 ASSAY SPEC XCP UR&BREATH IA: CPT | Performed by: EMERGENCY MEDICINE

## 2021-08-23 PROCEDURE — HZ2ZZZZ DETOXIFICATION SERVICES FOR SUBSTANCE ABUSE TREATMENT: ICD-10-PCS | Performed by: EMERGENCY MEDICINE

## 2021-08-23 PROCEDURE — U0003 INFECTIOUS AGENT DETECTION BY NUCLEIC ACID (DNA OR RNA); SEVERE ACUTE RESPIRATORY SYNDROME CORONAVIRUS 2 (SARS-COV-2) (CORONAVIRUS DISEASE [COVID-19]), AMPLIFIED PROBE TECHNIQUE, MAKING USE OF HIGH THROUGHPUT TECHNOLOGIES AS DESCRIBED BY CMS-2020-01-R: HCPCS | Performed by: EMERGENCY MEDICINE

## 2021-08-23 PROCEDURE — 72128 CT CHEST SPINE W/O DYE: CPT | Mod: 26 | Performed by: RADIOLOGY

## 2021-08-23 PROCEDURE — 72131 CT LUMBAR SPINE W/O DYE: CPT

## 2021-08-23 PROCEDURE — 99285 EMERGENCY DEPT VISIT HI MDM: CPT | Mod: 25 | Performed by: EMERGENCY MEDICINE

## 2021-08-23 PROCEDURE — 72128 CT CHEST SPINE W/O DYE: CPT

## 2021-08-23 PROCEDURE — 250N000013 HC RX MED GY IP 250 OP 250 PS 637: Performed by: EMERGENCY MEDICINE

## 2021-08-23 PROCEDURE — 82947 ASSAY GLUCOSE BLOOD QUANT: CPT | Performed by: EMERGENCY MEDICINE

## 2021-08-23 PROCEDURE — 99285 EMERGENCY DEPT VISIT HI MDM: CPT | Performed by: EMERGENCY MEDICINE

## 2021-08-23 PROCEDURE — 96361 HYDRATE IV INFUSION ADD-ON: CPT | Performed by: EMERGENCY MEDICINE

## 2021-08-23 PROCEDURE — 72125 CT NECK SPINE W/O DYE: CPT

## 2021-08-23 PROCEDURE — 82248 BILIRUBIN DIRECT: CPT | Performed by: NURSE PRACTITIONER

## 2021-08-23 PROCEDURE — 72131 CT LUMBAR SPINE W/O DYE: CPT | Mod: 26 | Performed by: RADIOLOGY

## 2021-08-23 PROCEDURE — 72125 CT NECK SPINE W/O DYE: CPT | Mod: 26 | Performed by: RADIOLOGY

## 2021-08-23 PROCEDURE — 258N000003 HC RX IP 258 OP 636: Performed by: EMERGENCY MEDICINE

## 2021-08-23 PROCEDURE — 83735 ASSAY OF MAGNESIUM: CPT | Performed by: NURSE PRACTITIONER

## 2021-08-23 RX ORDER — FOLIC ACID 1 MG/1
1 TABLET ORAL DAILY
Status: DISCONTINUED | OUTPATIENT
Start: 2021-08-24 | End: 2021-08-24

## 2021-08-23 RX ORDER — CALCIUM CARBONATE 500 MG/1
500 TABLET, CHEWABLE ORAL DAILY PRN
Status: DISCONTINUED | OUTPATIENT
Start: 2021-08-23 | End: 2021-08-28 | Stop reason: HOSPADM

## 2021-08-23 RX ORDER — IOPAMIDOL 755 MG/ML
100 INJECTION, SOLUTION INTRAVASCULAR ONCE
Status: COMPLETED | OUTPATIENT
Start: 2021-08-23 | End: 2021-08-23

## 2021-08-23 RX ORDER — LANOLIN ALCOHOL/MO/W.PET/CERES
100 CREAM (GRAM) TOPICAL DAILY
Status: DISCONTINUED | OUTPATIENT
Start: 2021-08-24 | End: 2021-08-24

## 2021-08-23 RX ORDER — MULTIPLE VITAMINS W/ MINERALS TAB 9MG-400MCG
1 TAB ORAL DAILY
Status: DISCONTINUED | OUTPATIENT
Start: 2021-08-24 | End: 2021-08-24

## 2021-08-23 RX ORDER — PHENOBARBITAL SODIUM 130 MG/ML
260 INJECTION, SOLUTION INTRAMUSCULAR; INTRAVENOUS ONCE
Status: COMPLETED | OUTPATIENT
Start: 2021-08-23 | End: 2021-08-24

## 2021-08-23 RX ORDER — ONDANSETRON 2 MG/ML
4 INJECTION INTRAMUSCULAR; INTRAVENOUS ONCE
Status: DISCONTINUED | OUTPATIENT
Start: 2021-08-23 | End: 2021-08-24

## 2021-08-23 RX ORDER — LORAZEPAM 0.5 MG/1
1-2 TABLET ORAL EVERY 30 MIN PRN
Status: DISCONTINUED | OUTPATIENT
Start: 2021-08-23 | End: 2021-08-24

## 2021-08-23 RX ADMIN — LORAZEPAM 2 MG: 0.5 TABLET ORAL at 22:34

## 2021-08-23 RX ADMIN — SODIUM CHLORIDE 1000 ML: 9 INJECTION, SOLUTION INTRAVENOUS at 20:21

## 2021-08-23 RX ADMIN — IOPAMIDOL 100 ML: 755 INJECTION, SOLUTION INTRAVENOUS at 22:23

## 2021-08-23 RX ADMIN — LORAZEPAM 2 MG: 0.5 TABLET ORAL at 23:06

## 2021-08-23 RX ADMIN — LORAZEPAM 2 MG: 0.5 TABLET ORAL at 23:33

## 2021-08-23 ASSESSMENT — MIFFLIN-ST. JEOR: SCORE: 1606.08

## 2021-08-23 NOTE — ED TRIAGE NOTES
50YR male patient - brought to ED by EMS; c/o RUQ abdominal pain, back pain; N/V during triage.  Patient admits to heavy drinking (ETOH); given iv-fluid bolus and 75mcg Fentanyl by EMS.  Airway patent; patient in moderate distress.

## 2021-08-23 NOTE — ED PROVIDER NOTES
"    [facility] DEPARTMENT ([facility])  August 23, 2021  History     Chief Complaint   Patient presents with     Abdominal Pain     RUQ     Back Pain     HPI  Ming Uribe is a 50 year old male with prior history of alcoholic hepatitis who presents with severe right upper quadrant abdominal pain, back pain, nausea, vomiting, diarrhea, hot and cold sweats that started at 4 AM today. He talked to a friend with a similar symptoms that currently is positive for COVID-19. He is concerned that his symptoms may be secondary to COVID.      Pain worst in low back and radiates down bilateral lower extremities. He also reports multiple falls due to pain and injuries to his ankle. He fell onto his right knee earlier this week and then reports a fall onto his left knee today while waiting in the ED. Estimates 5 falls in the past 2 days.     He drinks 1L of vodka daily. Last drink was last evening. Reports prior history of alcohol withdrawal \"sometimes\" but does not elaborate regarding the severity of his symptoms.      He contacted clinic and was advised to come to the ED for further evaluation.    Per [facility] records, patient did have the Pfizer COVID-19 vaccine, last dose was on 5/14/2021.    Epic records reviewed, patient had CT scan 4 months ago that showed:  CT of the Abdomen and Pelvis with contrast, 4/22/2021 4:38 PM  Impression:   1. Diffusely thickened sigmoid colon with associated pericolonic  inflammatory changes extending up into the mid descending colon  suggestive for infectious or inflammatory colitis. No free air or  pericolonic fluid collection. Consider colonoscopy as clinically  indicated.  2. Left external iliac chain adenopathy, likely reactive.  3. Enlarged, fatty liver.  4. Atherosclerotic calcification of the left anterior descending  coronary artery.  5. Small pancreatic cystic foci measuring up to 5 mm, likely side  branch IPMN. Recommend surveillance per recommendations " below.        PAST MEDICAL HISTORY:   Past Medical History:   Diagnosis Date     Acquired hypothyroidism      Alcoholic hepatitis without ascites      Moderate asthma     since age 13 years     Seasonal allergic rhinitis      Severe episode of recurrent major depressive disorder, without psychotic features (H)        PAST SURGICAL HISTORY: No past surgical history on file.    Past medical history, past surgical history, medications, and allergies were reviewed with the patient. Additional pertinent items: None    FAMILY HISTORY: No family history on file.    SOCIAL HISTORY:   Social History     Tobacco Use     Smoking status: Never Smoker     Smokeless tobacco: Former User     Tobacco comment: smoked in [employer]   Substance Use Topics     Alcohol use: Not on file     Social history was reviewed with the patient. Additional pertinent items: None      Patient's Medications   New Prescriptions    No medications on file   Previous Medications    ALBUTEROL (PROAIR HFA/PROVENTIL HFA/VENTOLIN HFA) 108 (90 BASE) MCG/ACT INHALER    Inhale 1-2 puffs into the lungs every 4 hours as needed for shortness of breath / dyspnea or wheezing    CLONAZEPAM (KLONOPIN) 0.5 MG TABLET    Take 1 tablet (0.5 mg) by mouth nightly as needed for anxiety or sleep    FLUTICASONE-SALMETEROL (ADVAIR) 250-50 MCG/DOSE INHALER    Inhale 1 puff into the lungs 2 times daily    LEVOTHYROXINE (SYNTHROID/LEVOTHROID) 150 MCG TABLET    Take 150 mcg by mouth daily    MONTELUKAST (SINGULAIR) 10 MG TABLET    Take 1 tablet (10 mg) by mouth At Bedtime    MULTIPLE VITAMINS-MINERALS (THERAPEUTIC-M) TABS    Take 1 tablet by mouth    SERTRALINE (ZOLOFT) 100 MG TABLET    Take 1 tablet (100 mg) by mouth daily   Modified Medications    No medications on file   Discontinued Medications    No medications on file          Allergies   Allergen Reactions     No Clinical Screening - See Comments Other (See Comments)     Grass, weeds, mold, dust        Review of Systems  A  "complete review of systems was performed with pertinent positives and negatives noted in the HPI, and all other systems negative.    Physical Exam   BP: 104/78  Pulse: 112  Temp: 99.2  F (37.3  C)  Resp: 20  Height: 177.8 cm (5' 10\")  Weight: 73.5 kg (162 lb)  SpO2: 92 %      Physical Exam  Vitals and nursing note reviewed.   Constitutional:       Appearance: He is well-developed.      Comments: Tremulous    HENT:      Mouth/Throat:      Comments: Dry mucous membranes  Pulmonary:      Effort: Pulmonary effort is normal.      Breath sounds: Normal breath sounds.      Comments: Tender over right lateral ribs   Abdominal:      Palpations: Abdomen is soft.      Tenderness: There is generalized abdominal tenderness.   Musculoskeletal:      Comments: Abrasions over bilateral knees. Normal ROM of all extremities. No deformities. Midline L-spine tenderness   Skin:     General: Skin is warm.      Capillary Refill: Capillary refill takes 2 to 3 seconds.      Comments: Ecchymosis over bilateral greater trochanters    Neurological:      Mental Status: He is alert.      Comments: Horizontal nystagmus. Full EOM. Tremulousness at rest and with action    Psychiatric:         Mood and Affect: Mood is anxious.         ED Course     ED Course as of Aug 24 0214   Mon Aug 23, 2021   2028 Indirect hyperbilirubinemia. Elevated transaminases with 2:1 ratio of AST to ALT.    Hepatic function panel(!)   2028 Leukocytosis. Anemia. Which appears to be new   CBC with Platelets & Differential(!)   2029 Hyponatremia    Basic metabolic panel(!)   2100 Lipase   2353 1.  Acute nondisplaced fractures involving the lateral aspect of the right 6th-8th ribs. Multiple other old healed fractures.     2.  Old posttraumatic and postthoracotomy as well as sternotomy deformities involving the ribs and sternum.     3.  Subcutaneous edema overlying the proximal femurs and greater trochanters bilaterally, greater on the left with a 5 cm subcutaneous hematoma " overlying the left greater trochanter.     4.  No solid organ injury.     5.  Minimal to moderate atelectasis right lung base.     6.  Hazy ill-defined ground-glass opacities in the right lung as detailed above suspicious for alveolar infiltrates related to pneumonia. Underlying aspiration pneumonitis could have this appearance. Given its unilaterality, this would be an atypical   presentation for COVID-19 pneumonia, but that cannot be completely excluded.     7.  Marked hepatic steatosis.   CT Chest/Abdomen/Pelvis w Contrast   2353 1.  Chronic-appearing mild compression deformity at T6.  2.  Stable mild chronic superior endplate compression deformity at T9.  3.  No high grade osseous spinal canal or neural foraminal narrowing.   CT Thoracic Spine w/o Contrast   2353 1.  No acute osseous abnormality of the lumbar spine.  2.  Degenerative changes as described.   Lumbar spine CT w/o contrast   2353 1.  Degenerative changes of the cervical spine. No evidence of an acute displaced fracture.   Cervical spine CT w/o contrast   2353 1.  No acute intracranial process   Head CT w/o contrast   2353 CIWA 20 despite multiple doses of ativan. We will give phenobarbital       Tue Aug 24, 2021   0009 Trauma has been consulted                Results for orders placed or performed during the hospital encounter of 08/23/21 (from the past 24 hour(s))   [place] Draw    Narrative    The following orders were created for panel order [place] Draw.  Procedure                               Abnormality         Status                     ---------                               -----------         ------                     Extra Blue Top Tube[[id number]]                              Final result               Extra Red Top Tube[[id number]]                               Final result               Extra Green Top (Lithium...[[id number]]                      Final result               Extra Purple Top Tube[[id number]]                             Final result                 Please view results for these tests on the individual orders.   CBC with Platelets & Differential    Narrative    The following orders were created for panel order CBC with Platelets & Differential.  Procedure                               Abnormality         Status                     ---------                               -----------         ------                     CBC with platelets and d...[[id number]]  Abnormal            Final result                 Please view results for these tests on the individual orders.   Basic metabolic panel   Result Value Ref Range    Sodium 127 (L) 133 - 144 mmol/L    Potassium 3.6 3.4 - 5.3 mmol/L    Chloride 89 (L) 94 - 109 mmol/L    Carbon Dioxide (CO2) 26 20 - 32 mmol/L    Anion Gap 12 3 - 14 mmol/L    Urea Nitrogen 24 7 - 30 mg/dL    Creatinine 1.18 0.66 - 1.25 mg/dL    Calcium 9.2 8.5 - 10.1 mg/dL    Glucose 107 (H) 70 - 99 mg/dL    GFR Estimate 72 >60 mL/min/1.73m2   Hepatic function panel   Result Value Ref Range    Bilirubin Total 2.1 (H) 0.2 - 1.3 mg/dL    Bilirubin Direct 0.7 (H) 0.0 - 0.2 mg/dL    Protein Total 7.9 6.8 - 8.8 g/dL    Albumin 3.2 (L) 3.4 - 5.0 g/dL    Alkaline Phosphatase 89 40 - 150 U/L     (H) 0 - 45 U/L    ALT 84 (H) 0 - 70 U/L   Lipase   Result Value Ref Range    Lipase 166 73 - 393 U/L   Extra Blue Top Tube   Result Value Ref Range    Hold Specimen JIC    Extra Red Top Tube   Result Value Ref Range    Hold Specimen JIC    Extra Green Top (Lithium Heparin) Tube   Result Value Ref Range    Hold Specimen JIC    Extra Purple Top Tube   Result Value Ref Range    Hold Specimen JIC    CBC with platelets and differential   Result Value Ref Range    WBC Count 12.5 (H) 4.0 - 11.0 10e3/uL    RBC Count 3.64 (L) 4.40 - 5.90 10e6/uL    Hemoglobin 12.1 (L) 13.3 - 17.7 g/dL    Hematocrit 34.3 (L) 40.0 - 53.0 %    MCV 94 78 - 100 fL    MCH 33.2 (H) 26.5 - 33.0 pg    MCHC 35.3 31.5 - 36.5 g/dL    RDW 12.8 10.0 - 15.0 %    Platelet  Count 153 150 - 450 10e3/uL    % Neutrophils 81 %    % Lymphocytes 5 %    % Monocytes 13 %    % Eosinophils 0 %    % Basophils 0 %    % Immature Granulocytes 1 %    NRBCs per 100 WBC 0 <1 /100    Absolute Neutrophils 10.0 (H) 1.6 - 8.3 10e3/uL    Absolute Lymphocytes 0.7 (L) 0.8 - 5.3 10e3/uL    Absolute Monocytes 1.6 (H) 0.0 - 1.3 10e3/uL    Absolute Eosinophils 0.1 0.0 - 0.7 10e3/uL    Absolute Basophils 0.0 0.0 - 0.2 10e3/uL    Absolute Immature Granulocytes 0.1 (H) <=0.0 10e3/uL    Absolute NRBCs 0.0 10e3/uL   Alcohol   Result Value Ref Range    Alcohol ethyl <0.01 <=0.01 g/dL   Glucose   Result Value Ref Range    Glucose 109 (H) 70 - 99 mg/dL   Symptomatic COVID-19 Virus (Coronavirus) by PCR Nasopharyngeal    Specimen: Nasopharyngeal; Swab   Result Value Ref Range    SARS CoV2 PCR Negative Negative    Narrative    Testing was performed using the Xpert Xpress SARS-CoV-2 Assay on the  Cepheid Gene-Xpert Instrument Systems. Additional information about  this Emergency Use Authorization (EUA) assay can be found via the Lab  Guide. This test should be ordered for the detection of SARS-CoV-2 in  individuals who meet SARS-CoV-2 clinical and/or epidemiological  criteria. Test performance is unknown in asymptomatic patients. This  test is for in vitro diagnostic use under the FDA EUA for  laboratories certified under CLIA to perform high complexity testing.  This test has not been FDA cleared or approved. A negative result  does not rule out the presence of PCR inhibitors in the specimen or  target RNA in concentration below the limit of detection for the  assay. The possibility of a false negative should be considered if  the patient's recent exposure or clinical presentation suggests  COVID-19. This test was validated by the [facility]. This laboratory is certified under  the Clinical Laboratory Improvement Amendments of 1988 (CLIA-88) as  qualified to perform high  complexity laboratory testing.     EKG 12-lead   Result Value Ref Range    Systolic Blood Pressure  mmHg    Diastolic Blood Pressure  mmHg    Ventricular Rate 79 BPM    Atrial Rate 79 BPM    GA Interval 154 ms    QRS Duration 90 ms     ms    QTc 504 ms    P Axis 57 degrees    R AXIS 72 degrees    T Axis 72 degrees    Interpretation ECG Sinus rhythm  Prolonged QT  Abnormal ECG      Head CT w/o contrast    Narrative    EXAM: CT HEAD W/O CONTRAST  LOCATION: [facility]  DATE/TIME: 8/23/2021 10:21 PM    INDICATION: Mental status change, unknown cause  COMPARISON: None.  TECHNIQUE: Routine CT Head without IV contrast. Multiplanar reformats. Dose reduction techniques were used.    FINDINGS:  INTRACRANIAL CONTENTS: No intracranial hemorrhage, extraaxial collection, or mass effect.  No CT evidence of acute infarct. Normal parenchymal attenuation for age. Mild generalized volume loss. No hydrocephalus.     VISUALIZED ORBITS/SINUSES/MASTOIDS: No intraorbital abnormality. No paranasal sinus mucosal disease. No middle ear or mastoid effusion.    BONES/SOFT TISSUES: No acute abnormality.      Impression    IMPRESSION:  1.  No acute intracranial process.   CT Chest/Abdomen/Pelvis w Contrast    Narrative    EXAM: CT CHEST/ABDOMEN/PELVIS W CONTRAST  LOCATION: [facility]  DATE/TIME: 8/23/2021 10:22 PM    INDICATION: Chest-abdomen-pelvis trauma, blunt. Multiple falls with decreased hemoglobin. Right upper quadrant abdominal pain and back pain. Assess for sequela of trauma.    COMPARISON: 4/22/2021.    TECHNIQUE: CT scan of the chest, abdomen and pelvis was performed following injection of IV contrast. Multiplanar reformats were obtained. Dose reduction techniques were used.     CONTRAST: 100 mL iopamidol (Isovue-370) solution.    FINDINGS:   LUNGS AND PLEURA: Minimal to moderate linear atelectasis right lung base. Hazy ill-defined  ground-glass opacity superior segment right lower lobe and right middle lobe. Hazy ill-defined ground-glass opacities and nodular infiltrates involving the   posterior inferior aspect of the right upper lobe. No pneumothorax or significant pleural fluid.    MEDIASTINUM/AXILLAE: No pneumomediastinum or significant fluid in the mediastinum. Normal heart size. No pericardial fluid. Normal caliber esophagus.    CORONARY ARTERY CALCIFICATION: Moderate.    HEPATOBILIARY: Marked hepatic steatosis. No hepatic laceration or perihepatic fluid. No calcified gallstones or biliary dilatation.    PANCREAS: Normal.    SPLEEN: Normal.    ADRENAL GLANDS: Normal.    KIDNEYS/BLADDER: Normal.    BOWEL: Normal.    LYMPH NODES: No lymphadenopathy.    VASCULATURE: No evidence for acute abdominal aortic injury. Branch vessels remain patent.    PELVIC ORGANS: No significant free fluid in the pelvis.    MUSCULOSKELETAL: Acute nondisplaced fracture lateral right 8th rib (series 8, image 266). Acute nondisplaced fracture lateral right 7th rib. Possible acute nondisplaced lateral right 6th rib fracture. Multiple other old healed right-sided rib fractures.   Sternotomy. Postthoracotomy changes anterior left 3rd rib. Posttraumatic deformity subscapular aspect of the right scapula. No definitive evidence for acute displaced pelvic fracture. No evidence for overt fracture or malalignment in the thoracic or   lumbar spine. Subcutaneous edema in proximal thighs and overlying both proximal femurs, partially visualized on the right. In the subcutaneous tissues overlying the left greater trochanter there is a tiny calcific density likely representing either old   trauma or calcification of gluteal tendons. Superior to this there is a small subcutaneous hematoma measuring 5 cm maximal diameter.      Impression    IMPRESSION:  1.  Acute nondisplaced fractures involving the lateral aspect of the right 6th-8th ribs. Multiple other old healed  fractures.    2.  Old posttraumatic and postthoracotomy as well as sternotomy deformities involving the ribs and sternum.    3.  Subcutaneous edema overlying the proximal femurs and greater trochanters bilaterally, greater on the left with a 5 cm subcutaneous hematoma overlying the left greater trochanter.    4.  No solid organ injury.    5.  Minimal to moderate atelectasis right lung base.    6.  Hazy ill-defined ground-glass opacities in the right lung as detailed above suspicious for alveolar infiltrates related to pneumonia. Underlying aspiration pneumonitis could have this appearance. Given its unilaterality, this would be an atypical   presentation for COVID-19 pneumonia, but that cannot be completely excluded.    7.  Marked hepatic steatosis.   Cervical spine CT w/o contrast    Narrative    EXAM: CT CERVICAL SPINE W/O CONTRAST  LOCATION: [facility]  DATE/TIME: 8/23/2021 10:21 PM    INDICATION: Traumatic injury  COMPARISON: None.  TECHNIQUE: Routine CT Cervical Spine without IV contrast. Multiplanar reformats. Dose reduction techniques were used.    FINDINGS:  VERTEBRA: Mild reversal and left apex curvature of the cervical spine. No spondylolisthesis. Vertebral body heights are preserved. No fracture or posttraumatic subluxation.     CANAL/FORAMINA: Multilevel degenerative changes at least mild canal stenosis at C3-C4. Multilevel bilateral neural foraminal narrowing, with moderate to severe right-sided stenosis at C3-C4 and at least mild bilateral stenosis at C4-C5.    PARASPINAL: No acute extraspinal abnormality. Atherosclerotic calcification of the carotid bifurcations. Visualized portions of the lung apices are clear.      Impression    IMPRESSION:  1.  Degenerative changes of the cervical spine. No evidence of an acute displaced fracture.   CT Thoracic Spine w/o Contrast    Narrative    EXAM: CT THORACIC SPINE W/O CONTRAST  LOCATION: [facility]  DATE/TIME: 8/23/2021 10:21 PM    INDICATION: Traumatic injury.    COMPARISON: CT abdomen and pelvis dated 4/22/2021.    TECHNIQUE: Dedicated axial, sagittal, and coronal images of the Thoracic Spine were generated utilizing CT chest source data and are separately reviewed. Dose reduction techniques were used.     FINDINGS:  VERTEBRA: Left apex curvature of the thoracic spine. Asymmetric right-sided compression deformity at T6 resulting in approximately 15% right lateral height loss. Given the lack of overlying soft tissue swelling, findings may be chronic in age. There is a   chronic mild superior endplate compression deformity at T9 that is unchanged from the prior CT abdomen and pelvis. Chronic deformities of the right 9th through 10th posterior right ribs at the costovertebral junction. Chronic deformity of the left 10th   rib at the costovertebral junction.     CANAL/FORAMINA: Mild degenerative changes without osseous spinal canal or neural foraminal narrowing.    PARASPINAL: See separately dictated chest CT for intrathoracic findings.      Impression    IMPRESSION:  1.  Chronic-appearing mild compression deformity at T6.  2.  Stable mild chronic superior endplate compression deformity at T9.  3.  No high grade osseous spinal canal or neural foraminal narrowing.     Lumbar spine CT w/o contrast    Narrative    EXAM: CT LUMBAR SPINE W/O CONTRAST  LOCATION: [facility]  DATE/TIME: 8/23/2021 10:21 PM    INDICATION: Low back pain, trauma.    COMPARISON: CT abdomen and pelvis dated 4/22/2021.    TECHNIQUE: Routine CT Lumbar Spine without IV contrast. Multiplanar reformats. Dose reduction techniques were used.     FINDINGS:  VERTEBRA: Right apex curvature of the upper lumbar spine and left apex curvature of the lower lumbar spine. Grade 1 anterolisthesis of L2 on L3. Vertebral body heights are preserved. No fracture or posttraumatic  subluxation.     CANAL/FORAMINA: Multilevel degenerative changes without high grade spinal canal stenosis. There is at least moderate neural foraminal narrowing on the right at L4-L5 and bilaterally at L5-S1.    PARASPINAL: See separately dictated CT abdomen and pelvis for intra-abdominal findings.      Impression    IMPRESSION:  1.  No acute osseous abnormality of the lumbar spine.  2.  Degenerative changes as described.   UA reflex to Microscopic and Culture    Specimen: Urine, Midstream   Result Value Ref Range    Color Urine Orange (A) Colorless, Straw, Light Yellow, Yellow    Appearance Urine Slightly Cloudy (A) Clear    Glucose Urine Negative Negative mg/dL    Bilirubin Urine Small (A) Negative    Ketones Urine 10  (A) Negative mg/dL    Specific Gravity Urine 1.025 1.003 - 1.035    Blood Urine Large (A) Negative    pH Urine 6.0 5.0 - 7.0    Protein Albumin Urine 200  (A) Negative mg/dL    Urobilinogen Urine Normal Normal, 2.0 mg/dL    Nitrite Urine Negative Negative    Leukocyte Esterase Urine Small (A) Negative    Mucus Urine Present (A) None Seen /LPF    RBC Urine 12 (H) <=2 /HPF    WBC Urine 11 (H) <=5 /HPF    Transitional Epithelials Urine <1 <=1 /HPF    Hyaline Casts Urine 72 (H) <=2 /LPF    Narrative    Urine Culture ordered based on laboratory criteria   Glucose by meter   Result Value Ref Range    GLUCOSE BY METER POCT 93 70 - 99 mg/dL   INR   Result Value Ref Range    INR 1.08 0.85 - 1.15     Medications   ondansetron (ZOFRAN) injection 4 mg (4 mg Intravenous Not Given 8/23/21 2023)   LORazepam (ATIVAN) tablet 1-2 mg (2 mg Oral Given 8/24/21 0111)   folic acid (FOLVITE) tablet 1 mg (has no administration in time range)   multivitamin w/minerals (THERA-VIT-M) tablet 1 tablet (has no administration in time range)   calcium carbonate (TUMS) chewable tablet 500 mg (500 mg Oral Given 8/24/21 0012)   HYDROmorphone (PF) (DILAUDID) injection 0.5 mg (0.5 mg Intravenous Given 8/24/21 0024)   0.9% sodium  chloride BOLUS (0 mLs Intravenous Stopped 8/23/21 2224)   iopamidol (ISOVUE-370) solution 100 mL (100 mLs Intravenous Given 8/23/21 2223)   sodium chloride (PF) 0.9% PF flush 80 mL (80 mLs Intravenous Given 8/23/21 2223)   PHENobarbital (LUMINAL) injection 260 mg (260 mg Intravenous Given 8/24/21 0037)   thiamine (B-1) 500 mg in sodium chloride 0.9 % 50 mL intermittent infusion (0 mg Intravenous Stopped 8/24/21 0117)   PHENobarbital (LUMINAL) injection 260 mg (260 mg Intravenous Given 8/24/21 0138)             Assessments & Plan (with Medical Decision Making)   Ming Uribe is a 50 year old male with a history of alcoholic hepatitis that is presenting with multiple concerns. He is borderline febrile. His exam is challenging as he reports pain in his RUQ, right flank, bilateral lower extremities, lumbar back as well as other locations. It is unclear if there is an underlying medical illness such as COVID-19 causing his symptoms vs traumatic injuries from multiple falls. It is difficult to obtain an accurate timeline and description of his symptoms. We will obtain labs, imaging and ECG. I anticipate he may require admission due to multiple falls.     Work up notable for 3 right sided rib fractures. He also has hematomas over bilateral greater trochanteric hematomas. Trauma consulted. While in the ED, his alcohol withdrawal worsened. He was treated with PO ativan as well as IV phenobarbital x2 with improvement. Considered Wernicke's encephalopathy and gave IV thiamine 500mg as well. He will be admitted to trauma surgery.     I have reviewed the nursing notes.    I have reviewed the findings, diagnosis, plan and need for follow up with the patient.    New Prescriptions    No medications on file       Final diagnoses:   Alcohol withdrawal syndrome with complication (H)   Closed fracture of multiple ribs of right side, initial encounter   Hematoma of lower extremity, unspecified laterality, initial encounter        Marianela Canas MD  8/23/2021   [facility] EMERGENCY DEPARTMENT     Marianela Canas MD  08/24/21 0214

## 2021-08-24 ENCOUNTER — APPOINTMENT (OUTPATIENT)
Dept: OCCUPATIONAL THERAPY | Facility: CLINIC | Age: 51
DRG: 183 | End: 2021-08-24
Attending: STUDENT IN AN ORGANIZED HEALTH CARE EDUCATION/TRAINING PROGRAM
Payer: COMMERCIAL

## 2021-08-24 DIAGNOSIS — F41.1 GAD (GENERALIZED ANXIETY DISORDER): ICD-10-CM

## 2021-08-24 DIAGNOSIS — F32.1 MODERATE MAJOR DEPRESSION (H): ICD-10-CM

## 2021-08-24 PROBLEM — S80.10XA: Status: ACTIVE | Noted: 2021-08-24

## 2021-08-24 PROBLEM — S22.41XA CLOSED FRACTURE OF MULTIPLE RIBS OF RIGHT SIDE, INITIAL ENCOUNTER: Status: ACTIVE | Noted: 2021-08-24

## 2021-08-24 PROBLEM — F10.939 ALCOHOL WITHDRAWAL SYNDROME WITH COMPLICATION (H): Status: ACTIVE | Noted: 2021-08-24

## 2021-08-24 LAB
ALBUMIN SERPL-MCNC: 3.2 G/DL (ref 3.4–5)
ALBUMIN UR-MCNC: 200 MG/DL
ALP SERPL-CCNC: 93 U/L (ref 40–150)
ALT SERPL W P-5'-P-CCNC: 86 U/L (ref 0–70)
ANION GAP SERPL CALCULATED.3IONS-SCNC: 7 MMOL/L (ref 3–14)
APPEARANCE UR: ABNORMAL
AST SERPL W P-5'-P-CCNC: 189 U/L (ref 0–45)
ATRIAL RATE - MUSE: 79 BPM
BILIRUB DIRECT SERPL-MCNC: 0.7 MG/DL (ref 0–0.2)
BILIRUB SERPL-MCNC: 2.1 MG/DL (ref 0.2–1.3)
BILIRUB UR QL STRIP: ABNORMAL
BUN SERPL-MCNC: 29 MG/DL (ref 7–30)
CALCIUM SERPL-MCNC: 8.5 MG/DL (ref 8.5–10.1)
CHLORIDE BLD-SCNC: 92 MMOL/L (ref 94–109)
CO2 SERPL-SCNC: 29 MMOL/L (ref 20–32)
COLOR UR AUTO: ABNORMAL
CREAT SERPL-MCNC: 0.81 MG/DL (ref 0.66–1.25)
DIASTOLIC BLOOD PRESSURE - MUSE: NORMAL MMHG
ERYTHROCYTE [DISTWIDTH] IN BLOOD BY AUTOMATED COUNT: 12.8 % (ref 10–15)
GFR SERPL CREATININE-BSD FRML MDRD: >90 ML/MIN/1.73M2
GLUCOSE BLD-MCNC: 109 MG/DL (ref 70–99)
GLUCOSE BLD-MCNC: 81 MG/DL (ref 70–99)
GLUCOSE BLDC GLUCOMTR-MCNC: 93 MG/DL (ref 70–99)
GLUCOSE UR STRIP-MCNC: NEGATIVE MG/DL
HCT VFR BLD AUTO: 31.3 % (ref 40–53)
HGB BLD-MCNC: 10.6 G/DL (ref 13.3–17.7)
HGB UR QL STRIP: ABNORMAL
HYALINE CASTS: 72 /LPF
INR PPP: 1.08 (ref 0.85–1.15)
INTERPRETATION ECG - MUSE: NORMAL
KETONES UR STRIP-MCNC: 10 MG/DL
LEUKOCYTE ESTERASE UR QL STRIP: ABNORMAL
MAGNESIUM SERPL-MCNC: 2 MG/DL (ref 1.6–2.3)
MAGNESIUM SERPL-MCNC: 2.2 MG/DL (ref 1.6–2.3)
MCH RBC QN AUTO: 33.2 PG (ref 26.5–33)
MCHC RBC AUTO-ENTMCNC: 33.9 G/DL (ref 31.5–36.5)
MCV RBC AUTO: 98 FL (ref 78–100)
MUCOUS THREADS #/AREA URNS LPF: PRESENT /LPF
NITRATE UR QL: NEGATIVE
P AXIS - MUSE: 57 DEGREES
PH UR STRIP: 6 [PH] (ref 5–7)
PHOSPHATE SERPL-MCNC: 2.2 MG/DL (ref 2.5–4.5)
PLATELET # BLD AUTO: 125 10E3/UL (ref 150–450)
POTASSIUM BLD-SCNC: 3.2 MMOL/L (ref 3.4–5.3)
PR INTERVAL - MUSE: 154 MS
PROT SERPL-MCNC: 7.2 G/DL (ref 6.8–8.8)
QRS DURATION - MUSE: 90 MS
QT - MUSE: 440 MS
QTC - MUSE: 504 MS
R AXIS - MUSE: 72 DEGREES
RBC # BLD AUTO: 3.19 10E6/UL (ref 4.4–5.9)
RBC URINE: 12 /HPF
SODIUM SERPL-SCNC: 128 MMOL/L (ref 133–144)
SODIUM SERPL-SCNC: 130 MMOL/L (ref 133–144)
SP GR UR STRIP: 1.02 (ref 1–1.03)
SYSTOLIC BLOOD PRESSURE - MUSE: NORMAL MMHG
T AXIS - MUSE: 72 DEGREES
TRANSITIONAL EPI: <1 /HPF
UROBILINOGEN UR STRIP-MCNC: NORMAL MG/DL
VENTRICULAR RATE- MUSE: 79 BPM
WBC # BLD AUTO: 8 10E3/UL (ref 4–11)
WBC URINE: 11 /HPF

## 2021-08-24 PROCEDURE — 258N000003 HC RX IP 258 OP 636: Performed by: EMERGENCY MEDICINE

## 2021-08-24 PROCEDURE — 85610 PROTHROMBIN TIME: CPT | Performed by: EMERGENCY MEDICINE

## 2021-08-24 PROCEDURE — 85027 COMPLETE CBC AUTOMATED: CPT | Performed by: NURSE PRACTITIONER

## 2021-08-24 PROCEDURE — 250N000013 HC RX MED GY IP 250 OP 250 PS 637: Performed by: EMERGENCY MEDICINE

## 2021-08-24 PROCEDURE — 80048 BASIC METABOLIC PNL TOTAL CA: CPT | Performed by: NURSE PRACTITIONER

## 2021-08-24 PROCEDURE — 96365 THER/PROPH/DIAG IV INF INIT: CPT | Mod: 59 | Performed by: EMERGENCY MEDICINE

## 2021-08-24 PROCEDURE — 258N000003 HC RX IP 258 OP 636: Performed by: STUDENT IN AN ORGANIZED HEALTH CARE EDUCATION/TRAINING PROGRAM

## 2021-08-24 PROCEDURE — 83735 ASSAY OF MAGNESIUM: CPT | Performed by: NURSE PRACTITIONER

## 2021-08-24 PROCEDURE — 36415 COLL VENOUS BLD VENIPUNCTURE: CPT | Performed by: NURSE PRACTITIONER

## 2021-08-24 PROCEDURE — 97535 SELF CARE MNGMENT TRAINING: CPT | Mod: GO | Performed by: OCCUPATIONAL THERAPIST

## 2021-08-24 PROCEDURE — 250N000013 HC RX MED GY IP 250 OP 250 PS 637: Performed by: INTERNAL MEDICINE

## 2021-08-24 PROCEDURE — 81001 URINALYSIS AUTO W/SCOPE: CPT | Performed by: EMERGENCY MEDICINE

## 2021-08-24 PROCEDURE — 99232 SBSQ HOSP IP/OBS MODERATE 35: CPT | Performed by: NURSE PRACTITIONER

## 2021-08-24 PROCEDURE — 250N000011 HC RX IP 250 OP 636: Performed by: EMERGENCY MEDICINE

## 2021-08-24 PROCEDURE — 120N000002 HC R&B MED SURG/OB UMMC

## 2021-08-24 PROCEDURE — 250N000009 HC RX 250: Performed by: STUDENT IN AN ORGANIZED HEALTH CARE EDUCATION/TRAINING PROGRAM

## 2021-08-24 PROCEDURE — 84100 ASSAY OF PHOSPHORUS: CPT | Performed by: NURSE PRACTITIONER

## 2021-08-24 PROCEDURE — 87086 URINE CULTURE/COLONY COUNT: CPT | Performed by: EMERGENCY MEDICINE

## 2021-08-24 PROCEDURE — 250N000011 HC RX IP 250 OP 636: Performed by: NURSE PRACTITIONER

## 2021-08-24 PROCEDURE — 250N000013 HC RX MED GY IP 250 OP 250 PS 637: Performed by: STUDENT IN AN ORGANIZED HEALTH CARE EDUCATION/TRAINING PROGRAM

## 2021-08-24 PROCEDURE — 96366 THER/PROPH/DIAG IV INF ADDON: CPT | Performed by: EMERGENCY MEDICINE

## 2021-08-24 PROCEDURE — 36415 COLL VENOUS BLD VENIPUNCTURE: CPT | Performed by: EMERGENCY MEDICINE

## 2021-08-24 PROCEDURE — 96376 TX/PRO/DX INJ SAME DRUG ADON: CPT | Performed by: EMERGENCY MEDICINE

## 2021-08-24 PROCEDURE — 96375 TX/PRO/DX INJ NEW DRUG ADDON: CPT | Performed by: EMERGENCY MEDICINE

## 2021-08-24 PROCEDURE — 94150 VITAL CAPACITY TEST: CPT

## 2021-08-24 PROCEDURE — 96367 TX/PROPH/DG ADDL SEQ IV INF: CPT | Performed by: EMERGENCY MEDICINE

## 2021-08-24 PROCEDURE — 97165 OT EVAL LOW COMPLEX 30 MIN: CPT | Mod: GO | Performed by: OCCUPATIONAL THERAPIST

## 2021-08-24 PROCEDURE — 84295 ASSAY OF SERUM SODIUM: CPT | Performed by: NURSE PRACTITIONER

## 2021-08-24 PROCEDURE — 250N000011 HC RX IP 250 OP 636: Performed by: STUDENT IN AN ORGANIZED HEALTH CARE EDUCATION/TRAINING PROGRAM

## 2021-08-24 PROCEDURE — 999N000157 HC STATISTIC RCP TIME EA 10 MIN

## 2021-08-24 PROCEDURE — 250N000013 HC RX MED GY IP 250 OP 250 PS 637: Performed by: NURSE PRACTITIONER

## 2021-08-24 RX ORDER — HYDROMORPHONE HYDROCHLORIDE 1 MG/ML
0.5 INJECTION, SOLUTION INTRAMUSCULAR; INTRAVENOUS; SUBCUTANEOUS
Status: DISCONTINUED | OUTPATIENT
Start: 2021-08-24 | End: 2021-08-24

## 2021-08-24 RX ORDER — PHENOBARBITAL SODIUM 130 MG/ML
260 INJECTION, SOLUTION INTRAMUSCULAR; INTRAVENOUS ONCE
Status: COMPLETED | OUTPATIENT
Start: 2021-08-24 | End: 2021-08-24

## 2021-08-24 RX ORDER — LIDOCAINE 4 G/G
1 PATCH TOPICAL
Status: DISCONTINUED | OUTPATIENT
Start: 2021-08-24 | End: 2021-08-24

## 2021-08-24 RX ORDER — CLONIDINE HYDROCHLORIDE 0.1 MG/1
0.1 TABLET ORAL EVERY 8 HOURS
Status: DISCONTINUED | OUTPATIENT
Start: 2021-08-24 | End: 2021-08-28 | Stop reason: HOSPADM

## 2021-08-24 RX ORDER — LIDOCAINE 4 G/G
1-3 PATCH TOPICAL
Status: DISCONTINUED | OUTPATIENT
Start: 2021-08-24 | End: 2021-08-28 | Stop reason: HOSPADM

## 2021-08-24 RX ORDER — GABAPENTIN 300 MG/1
300 CAPSULE ORAL 3 TIMES DAILY
Status: DISCONTINUED | OUTPATIENT
Start: 2021-08-24 | End: 2021-08-24

## 2021-08-24 RX ORDER — GABAPENTIN 600 MG/1
1200 TABLET ORAL ONCE
Status: COMPLETED | OUTPATIENT
Start: 2021-08-24 | End: 2021-08-24

## 2021-08-24 RX ORDER — PANTOPRAZOLE SODIUM 40 MG/1
40 TABLET, DELAYED RELEASE ORAL
Status: DISCONTINUED | OUTPATIENT
Start: 2021-08-24 | End: 2021-08-28 | Stop reason: HOSPADM

## 2021-08-24 RX ORDER — LEVOFLOXACIN 5 MG/ML
750 INJECTION, SOLUTION INTRAVENOUS EVERY 24 HOURS
Status: DISCONTINUED | OUTPATIENT
Start: 2021-08-24 | End: 2021-08-24

## 2021-08-24 RX ORDER — ALBUTEROL SULFATE 90 UG/1
1-2 AEROSOL, METERED RESPIRATORY (INHALATION) EVERY 4 HOURS PRN
Status: DISCONTINUED | OUTPATIENT
Start: 2021-08-24 | End: 2021-08-28 | Stop reason: HOSPADM

## 2021-08-24 RX ORDER — CYCLOBENZAPRINE HCL 5 MG
5 TABLET ORAL 3 TIMES DAILY PRN
Status: DISCONTINUED | OUTPATIENT
Start: 2021-08-24 | End: 2021-08-28 | Stop reason: HOSPADM

## 2021-08-24 RX ORDER — LANOLIN ALCOHOL/MO/W.PET/CERES
100 CREAM (GRAM) TOPICAL DAILY
Status: DISCONTINUED | OUTPATIENT
Start: 2021-08-25 | End: 2021-08-24

## 2021-08-24 RX ORDER — POLYETHYLENE GLYCOL 3350 17 G/17G
17 POWDER, FOR SOLUTION ORAL DAILY PRN
Status: DISCONTINUED | OUTPATIENT
Start: 2021-08-24 | End: 2021-08-28 | Stop reason: HOSPADM

## 2021-08-24 RX ORDER — SODIUM CHLORIDE 9 MG/ML
1000 INJECTION, SOLUTION INTRAVENOUS CONTINUOUS
Status: DISCONTINUED | OUTPATIENT
Start: 2021-08-24 | End: 2021-08-25

## 2021-08-24 RX ORDER — METHOCARBAMOL 500 MG/1
500 TABLET, FILM COATED ORAL EVERY 6 HOURS PRN
Status: DISCONTINUED | OUTPATIENT
Start: 2021-08-24 | End: 2021-08-24

## 2021-08-24 RX ORDER — ACETAMINOPHEN 325 MG/1
975 TABLET ORAL 3 TIMES DAILY
Status: DISCONTINUED | OUTPATIENT
Start: 2021-08-24 | End: 2021-08-24

## 2021-08-24 RX ORDER — GABAPENTIN 300 MG/1
600 CAPSULE ORAL EVERY 8 HOURS
Status: DISCONTINUED | OUTPATIENT
Start: 2021-08-27 | End: 2021-08-25

## 2021-08-24 RX ORDER — HYDROMORPHONE HCL IN WATER/PF 6 MG/30 ML
0.2 PATIENT CONTROLLED ANALGESIA SYRINGE INTRAVENOUS
Status: DISCONTINUED | OUTPATIENT
Start: 2021-08-24 | End: 2021-08-28 | Stop reason: HOSPADM

## 2021-08-24 RX ORDER — GABAPENTIN 300 MG/1
300 CAPSULE ORAL EVERY 8 HOURS
Status: DISCONTINUED | OUTPATIENT
Start: 2021-08-29 | End: 2021-08-25

## 2021-08-24 RX ORDER — FOLIC ACID 1 MG/1
1 TABLET ORAL DAILY
Status: DISCONTINUED | OUTPATIENT
Start: 2021-08-25 | End: 2021-08-28 | Stop reason: HOSPADM

## 2021-08-24 RX ORDER — LEVOTHYROXINE SODIUM 150 UG/1
150 TABLET ORAL DAILY
Status: DISCONTINUED | OUTPATIENT
Start: 2021-08-24 | End: 2021-08-28 | Stop reason: HOSPADM

## 2021-08-24 RX ORDER — POTASSIUM CHLORIDE 750 MG/1
40 TABLET, EXTENDED RELEASE ORAL ONCE
Status: COMPLETED | OUTPATIENT
Start: 2021-08-24 | End: 2021-08-24

## 2021-08-24 RX ORDER — GABAPENTIN 100 MG/1
100 CAPSULE ORAL EVERY 8 HOURS
Status: DISCONTINUED | OUTPATIENT
Start: 2021-08-31 | End: 2021-08-25

## 2021-08-24 RX ORDER — MONTELUKAST SODIUM 10 MG/1
10 TABLET ORAL AT BEDTIME
Status: DISCONTINUED | OUTPATIENT
Start: 2021-08-24 | End: 2021-08-28 | Stop reason: HOSPADM

## 2021-08-24 RX ORDER — ONDANSETRON 2 MG/ML
4 INJECTION INTRAMUSCULAR; INTRAVENOUS EVERY 6 HOURS PRN
Status: DISCONTINUED | OUTPATIENT
Start: 2021-08-24 | End: 2021-08-28 | Stop reason: HOSPADM

## 2021-08-24 RX ORDER — MULTIPLE VITAMINS W/ MINERALS TAB 9MG-400MCG
1 TAB ORAL DAILY
Status: DISCONTINUED | OUTPATIENT
Start: 2021-08-25 | End: 2021-08-28 | Stop reason: HOSPADM

## 2021-08-24 RX ORDER — MULTIPLE VITAMINS W/ MINERALS TAB 9MG-400MCG
1 TAB ORAL DAILY
Status: DISCONTINUED | OUTPATIENT
Start: 2021-08-24 | End: 2021-08-24

## 2021-08-24 RX ORDER — CEFTRIAXONE 2 G/1
2 INJECTION, POWDER, FOR SOLUTION INTRAMUSCULAR; INTRAVENOUS EVERY 24 HOURS
Status: DISCONTINUED | OUTPATIENT
Start: 2021-08-25 | End: 2021-08-27

## 2021-08-24 RX ORDER — ONDANSETRON 4 MG/1
4 TABLET, ORALLY DISINTEGRATING ORAL EVERY 6 HOURS PRN
Status: DISCONTINUED | OUTPATIENT
Start: 2021-08-24 | End: 2021-08-28 | Stop reason: HOSPADM

## 2021-08-24 RX ORDER — ACETAMINOPHEN 325 MG/1
975 TABLET ORAL EVERY 8 HOURS
Status: DISCONTINUED | OUTPATIENT
Start: 2021-08-24 | End: 2021-08-24

## 2021-08-24 RX ORDER — ACETAMINOPHEN 325 MG/1
650 TABLET ORAL EVERY 8 HOURS
Status: DISCONTINUED | OUTPATIENT
Start: 2021-08-24 | End: 2021-08-28 | Stop reason: HOSPADM

## 2021-08-24 RX ORDER — AMOXICILLIN 250 MG
1-2 CAPSULE ORAL 2 TIMES DAILY
Status: DISCONTINUED | OUTPATIENT
Start: 2021-08-24 | End: 2021-08-28 | Stop reason: HOSPADM

## 2021-08-24 RX ORDER — GABAPENTIN 300 MG/1
900 CAPSULE ORAL EVERY 8 HOURS
Status: DISCONTINUED | OUTPATIENT
Start: 2021-08-24 | End: 2021-08-25

## 2021-08-24 RX ORDER — LANOLIN ALCOHOL/MO/W.PET/CERES
200 CREAM (GRAM) TOPICAL DAILY
Status: DISCONTINUED | OUTPATIENT
Start: 2021-08-25 | End: 2021-08-28 | Stop reason: HOSPADM

## 2021-08-24 RX ORDER — FOLIC ACID 1 MG/1
1 TABLET ORAL DAILY
Status: DISCONTINUED | OUTPATIENT
Start: 2021-08-24 | End: 2021-08-24

## 2021-08-24 RX ORDER — DOXYCYCLINE 100 MG/1
100 CAPSULE ORAL EVERY 12 HOURS SCHEDULED
Status: DISCONTINUED | OUTPATIENT
Start: 2021-08-25 | End: 2021-08-28 | Stop reason: HOSPADM

## 2021-08-24 RX ORDER — BISACODYL 10 MG
10 SUPPOSITORY, RECTAL RECTAL DAILY PRN
Status: DISCONTINUED | OUTPATIENT
Start: 2021-08-24 | End: 2021-08-28 | Stop reason: HOSPADM

## 2021-08-24 RX ORDER — LORAZEPAM 0.5 MG/1
1-2 TABLET ORAL EVERY 30 MIN PRN
Status: DISCONTINUED | OUTPATIENT
Start: 2021-08-24 | End: 2021-08-28 | Stop reason: HOSPADM

## 2021-08-24 RX ORDER — SERTRALINE HYDROCHLORIDE 100 MG/1
100 TABLET, FILM COATED ORAL DAILY
Status: DISCONTINUED | OUTPATIENT
Start: 2021-08-24 | End: 2021-08-28 | Stop reason: HOSPADM

## 2021-08-24 RX ORDER — CALCIUM CARBONATE 500(1250)
500 TABLET ORAL 2 TIMES DAILY WITH MEALS
Status: DISCONTINUED | OUTPATIENT
Start: 2021-08-24 | End: 2021-08-28 | Stop reason: HOSPADM

## 2021-08-24 RX ADMIN — LEVOFLOXACIN 750 MG: 5 INJECTION, SOLUTION INTRAVENOUS at 13:01

## 2021-08-24 RX ADMIN — CLONIDINE HYDROCHLORIDE 0.1 MG: 0.1 TABLET ORAL at 18:08

## 2021-08-24 RX ADMIN — PHENOBARBITAL SODIUM 260 MG: 130 INJECTION INTRAMUSCULAR; INTRAVENOUS at 01:38

## 2021-08-24 RX ADMIN — LORAZEPAM 1 MG: 0.5 TABLET ORAL at 05:57

## 2021-08-24 RX ADMIN — SODIUM CHLORIDE 1000 ML: 9 INJECTION, SOLUTION INTRAVENOUS at 23:31

## 2021-08-24 RX ADMIN — PANTOPRAZOLE SODIUM 40 MG: 40 TABLET, DELAYED RELEASE ORAL at 16:41

## 2021-08-24 RX ADMIN — CALCIUM CARBONATE (ANTACID) CHEW TAB 500 MG 500 MG: 500 CHEW TAB at 00:12

## 2021-08-24 RX ADMIN — POTASSIUM CHLORIDE 40 MEQ: 750 TABLET, EXTENDED RELEASE ORAL at 20:54

## 2021-08-24 RX ADMIN — SODIUM CHLORIDE 1000 ML: 9 INJECTION, SOLUTION INTRAVENOUS at 13:05

## 2021-08-24 RX ADMIN — LORAZEPAM 2 MG: 0.5 TABLET ORAL at 00:12

## 2021-08-24 RX ADMIN — Medication 500 MG: at 18:08

## 2021-08-24 RX ADMIN — LORAZEPAM 2 MG: 0.5 TABLET ORAL at 00:38

## 2021-08-24 RX ADMIN — ACETAMINOPHEN 975 MG: 325 TABLET, FILM COATED ORAL at 10:36

## 2021-08-24 RX ADMIN — LORAZEPAM 2 MG: 0.5 TABLET ORAL at 12:03

## 2021-08-24 RX ADMIN — Medication 500 MG: at 11:06

## 2021-08-24 RX ADMIN — HYDROMORPHONE HYDROCHLORIDE 0.2 MG: 0.2 INJECTION, SOLUTION INTRAMUSCULAR; INTRAVENOUS; SUBCUTANEOUS at 23:44

## 2021-08-24 RX ADMIN — PHENOBARBITAL SODIUM 260 MG: 130 INJECTION INTRAMUSCULAR; INTRAVENOUS at 00:37

## 2021-08-24 RX ADMIN — LEVOTHYROXINE SODIUM 150 MCG: 0.15 TABLET ORAL at 11:07

## 2021-08-24 RX ADMIN — LIDOCAINE 1 PATCH: 246 PATCH TOPICAL at 11:07

## 2021-08-24 RX ADMIN — FOLIC ACID: 5 INJECTION, SOLUTION INTRAMUSCULAR; INTRAVENOUS; SUBCUTANEOUS at 10:49

## 2021-08-24 RX ADMIN — SERTRALINE HYDROCHLORIDE 100 MG: 100 TABLET ORAL at 11:06

## 2021-08-24 RX ADMIN — THIAMINE HYDROCHLORIDE 500 MG: 100 INJECTION, SOLUTION INTRAMUSCULAR; INTRAVENOUS at 00:57

## 2021-08-24 RX ADMIN — LORAZEPAM 2 MG: 0.5 TABLET ORAL at 01:11

## 2021-08-24 RX ADMIN — HYDROMORPHONE HYDROCHLORIDE 0.5 MG: 1 INJECTION, SOLUTION INTRAMUSCULAR; INTRAVENOUS; SUBCUTANEOUS at 00:24

## 2021-08-24 RX ADMIN — GABAPENTIN 1200 MG: 600 TABLET, FILM COATED ORAL at 10:36

## 2021-08-24 RX ADMIN — POTASSIUM & SODIUM PHOSPHATES POWDER PACK 280-160-250 MG 1 PACKET: 280-160-250 PACK at 20:53

## 2021-08-24 RX ADMIN — CLONIDINE HYDROCHLORIDE 0.1 MG: 0.1 TABLET ORAL at 11:07

## 2021-08-24 RX ADMIN — DOCUSATE SODIUM 50 MG AND SENNOSIDES 8.6 MG 2 TABLET: 8.6; 5 TABLET, FILM COATED ORAL at 20:54

## 2021-08-24 RX ADMIN — ACETAMINOPHEN 650 MG: 325 TABLET, FILM COATED ORAL at 18:08

## 2021-08-24 RX ADMIN — LORAZEPAM 1 MG: 0.5 TABLET ORAL at 16:41

## 2021-08-24 ASSESSMENT — ACTIVITIES OF DAILY LIVING (ADL)
ADLS_ACUITY_SCORE: 16
ADLS_ACUITY_SCORE: 16
PREVIOUS_RESPONSIBILITIES: MEAL PREP;HOUSEKEEPING;LAUNDRY;SHOPPING;MEDICATION MANAGEMENT;FINANCES;DRIVING

## 2021-08-24 NOTE — ED NOTES
Patient's brother, Jeferson calling for update.  Patient sleepy and unable to give consent to release medical information at this time.  Brother Jeferson can be reached at: [phone]. Jeferson would like to be updated as possible.

## 2021-08-24 NOTE — ED NOTES
RN at bedside with . Pt attempted to find  and stumbled backwards landing on sacrum accompanied by tech. Denies pain, Denies hitting head, -LOC, Denies blood thinner.   Neuro assessment completed in chart. Pt placed back in ED stretcher with bed alarm and instructed not to walk without help. Pt verbalize understanding.

## 2021-08-24 NOTE — H&P
[facility]    History and Physical / Consult note: Trauma Service     Date of Admission:  8/23/2021    Time of Admission/Consult Request (page/call): 12:06 AM    Time of my evaluation: 12:50 AM  Consulting services:      Assessment & Plan   Trauma mechanism: multiple falls  Time/date of injury: last 48 hours  Known Injuries:  1. Right rib fractures 6-8  2. Left sided hip hematoma  3. Scattered bruising    Other diagnoses:   1. Etoh Withdrawal  2. Hyponatremia  3. Alcoholic hepatitis  4. ?PNA    Plan:  1. Admit to Trauma  2. Rib fracture protocol  3. Pain control  4. CIWA protocol  5. PT/OT  6. Follow-up AM Hgb  7. Follow-up INR  8. Tertiary in AM    Code status: Full confirmed with patient.     ETOH: This patient was asked if in the last 3-6 months there has been a time when he had  5 or more drinks in a single day/outing.. Patient answer to the screening question was in the positive. Spoke with the patient about the correlation of ETOH use and accidents/injuries. We also discussed the importance of abstaining from ETOH use while healing from existing injuries, especially if prescribed narcotics at the time of discharge. The patient demonstrated understanding.  Primary Care Physician   Sofi Webster    Chief Complaint   Rib pain    History is obtained from the patient    History of Present Illness   Ming Uribe is a 50 year old male who presents with history of alcoholic hepatitis presented with abdominal pain, back pain, nausea and vomiting. He usually drinks 1L a day, but not in last day. He has had withdrawal in the past. He admitted to falling several times in the past few days. He is a poor historian and is actively withdrawing. He was given phenobarbital and is on CIWA protocol. He was scanned and found to have 3 rib fractures on the right.      Past Medical History    I have reviewed this patient's medical history and updated it with pertinent information  if needed.   Past Medical History:   Diagnosis Date     Acquired hypothyroidism      Alcoholic hepatitis without ascites      Moderate asthma     since age 13 years     Seasonal allergic rhinitis      Severe episode of recurrent major depressive disorder, without psychotic features (H)        Past Surgical History   I have reviewed this patient's surgical history and updated it with pertinent information if needed.  No past surgical history on file.  Prior to Admission Medications   Prior to Admission Medications   Prescriptions Last Dose Informant Patient Reported? Taking?   Multiple Vitamins-Minerals (THERAPEUTIC-M) TABS   Yes No   Sig: Take 1 tablet by mouth   albuterol (PROAIR HFA/PROVENTIL HFA/VENTOLIN HFA) 108 (90 Base) MCG/ACT inhaler   No No   Sig: Inhale 1-2 puffs into the lungs every 4 hours as needed for shortness of breath / dyspnea or wheezing   clonazePAM (KLONOPIN) 0.5 MG tablet   No No   Sig: Take 1 tablet (0.5 mg) by mouth nightly as needed for anxiety or sleep   fluticasone-salmeterol (ADVAIR) 250-50 MCG/DOSE inhaler   No No   Sig: Inhale 1 puff into the lungs 2 times daily   levothyroxine (SYNTHROID/LEVOTHROID) 150 MCG tablet   Yes No   Sig: Take 150 mcg by mouth daily   montelukast (SINGULAIR) 10 MG tablet   No No   Sig: Take 1 tablet (10 mg) by mouth At Bedtime   sertraline (ZOLOFT) 100 MG tablet   No No   Sig: Take 1 tablet (100 mg) by mouth daily      Facility-Administered Medications: None     Allergies   Allergies   Allergen Reactions     No Clinical Screening - See Comments Other (See Comments)     Grass, weeds, mold, dust       Social History   Social History     Socioeconomic History     Marital status:      Spouse name: Not on file     Number of children: Not on file     Years of education: Not on file     Highest education level: Not on file   Occupational History     Not on file   Tobacco Use     Smoking status: Never Smoker     Smokeless tobacco: Former User     Tobacco  comment: smoked in college   Substance and Sexual Activity     Alcohol use: Not on file     Drug use: Not on file     Comment: history alcohol use     Sexual activity: Not on file   Other Topics Concern     Not on file   Social History Narrative    Getting divorce from wife    Has son.     Experiencing increased stress currently     Social Determinants of Health     Financial Resource Strain:      Difficulty of Paying Living Expenses:    Food Insecurity:      Worried About Running Out of Food in the Last Year:      Ran Out of Food in the Last Year:    Transportation Needs:      Lack of Transportation (Medical):      Lack of Transportation (Non-Medical):    Physical Activity:      Days of Exercise per Week:      Minutes of Exercise per Session:    Stress:      Feeling of Stress :    Social Connections:      Frequency of Communication with Friends and Family:      Frequency of Social Gatherings with Friends and Family:      Attends Rastafarian Services:      Active Member of Clubs or Organizations:      Attends Club or Organization Meetings:      Marital Status:    Intimate Partner Violence:      Fear of Current or Ex-Partner:      Emotionally Abused:      Physically Abused:      Sexually Abused:        Family History   Family history reviewed with patient and is noncontributory.    Review of Systems   CONSTITUTIONAL: No fever, chills, sweats, fatigue   EYES: no visual blurring, no double vision or visual loss  ENT: no decrease in hearing, no tinnitus, no vertigo, no hoarseness  RESPIRATORY: no shortness of breath, no cough, no sputum   CARDIOVASCULAR: no palpitations, no chest  pain, no exertional chest pain or pressure  GASTROINTESTINAL: no nausea or vomiting, or abd pain  GENITOURINARY: no dysuria, no frequency or hesitancy, no hematuria  MUSCULOSKELETAL: no weakness, no redness, no swelling, no joint pain,   SKIN: no rashes, ecchymoses, abrasions or lacerations  NEUROLOGIC: no numbness or tingling of hands, no  numbness or tingling  of feet, no syncope, no tremors or weakness  PSYCHIATRIC: no sleep disturbances, no anxiety or depression    Physical Exam   Temp: 99.2  F (37.3  C) Temp src: Oral BP: (!) 154/74 Pulse: 82   Resp: 25 SpO2: 97 % O2 Device: Nasal cannula Oxygen Delivery: 2 LPM  Vital Signs with Ranges  Temp:  [99.2  F (37.3  C)] 99.2  F (37.3  C)  Pulse:  [] 82  Resp:  [18-25] 25  BP: (104-154)/() 154/74  SpO2:  [84 %-97 %] 97 % 162 lbs 0 oz    Primary Survey:  Airway: patient talking  Breathing: symmetric respiratory effort bilaterally  Circulation: central pulses present and peripheral pulses present  Disability: Pupils - left 4 mm and brisk, right 4 mm and brisk     Anival Coma Scale - Total 15/15  Eye Response (E): 4  4= spontaneous,  3= to verbal/voice, 2=  to pain, 1= No response   Verbal Response (V): 5   5= Orientated, converses,  4= Confused, converses, 3= Inappropriate words,  2= Incomprehensible sounds,  1=No response   Motor Response (M): 6   6= Obeys commands, 5= Localizes to pain, 4= Withdrawal to pain, 3=Fexion to pain, 2= Extension to pain, 1= No response    Secondary Survey:  General: tremulous, picking at things  Head: atraumatic, normocephalic, trachea midline  Eyes: PERRLA, pupils 3 mm, EOMI, corneas and conjunctivae clear  Ears:non-inflamed external ear canals  Nose: nares patent, no drainage, nasal septum non-tender  Mouth/Throat: no exudates or erythema,  no dental tenderness or malocclusions, no tongue lacerations  Neck: no cervical collar present. No midline posterior tenderness, full AROM without pain or tenderness   Chest/Pulmonary: normal respiratory rate and rhythm,  bilateral clear breath sounds, no wheezes, rales or rhonchi, no chest wall tenderness or deformities,   Cardiovascular: S1, S2,  normal and regular rate and rhythm, no murmurs  Abdomen: soft, non-tender, no guarding, no rebound tenderness and no tenderness to palpation  :pelvis stable to lateral  compression, no feldman, large left sided hip hematoma 30cm 30 cm  Back/Spine: no deformity, no midline tenderness, no sacral tenderness,  no step-offs and no abrasions or contusions  Musculoskel/Extremities: normal extremities, full AROM of major joints without tenderness, edema, erythema, ecchymosis, or abrasions  Hand: no gross deformities of hands or fingers. Full AROM of hand and fingers in flexion and extension.  strength equal and symmetric.   Skin: no rashes, laceration, ecchymosis, skin warm and dry.   Neuro: PERRLA, alert, oriented x 3. CN II-XII grossly intact. No focal deficits. Strength 5/5 x 4 extremities.  Sensation intact.  Psychiatric: affect/mood normal, cooperative, normal judgement/insight and memory intact  # Pain Assessment:  Current Pain Score 8/23/2021   Patient currently in pain? yes   - Ming is experiencing pain due to rib fractures. Pain management was discussed and the plan was created in a collaborative fashion.  Ming's response to the current recommendations: engaged  - Pharmacologic adjuvants: Acetaminophen    Data   UA RESULTS:  No results for input(s): COLOR, APPEARANCE, URINEGLC, URINEBILI, URINEKETONE, SG, UBLD, URINEPH, PROTEIN, UROBILINOGEN, NITRITE, LEUKEST, RBCU, WBCU in the last [id number] hours.   Results for orders placed or performed during the hospital encounter of 08/23/21 (from the past 24 hour(s))   [place] Draw    Narrative    The following orders were created for panel order [place] Draw.  Procedure                               Abnormality         Status                     ---------                               -----------         ------                     Extra Blue Top Tube[[id number]]                              Final result               Extra Red Top Tube[[id number]]                               Final result               Extra Green Top (Lithium...[[id number]]                      Final result               Extra Purple Top Tube[[id number]]                             Final result                 Please view results for these tests on the individual orders.   CBC with Platelets & Differential    Narrative    The following orders were created for panel order CBC with Platelets & Differential.  Procedure                               Abnormality         Status                     ---------                               -----------         ------                     CBC with platelets and d...[[id number]]  Abnormal            Final result                 Please view results for these tests on the individual orders.   Basic metabolic panel   Result Value Ref Range    Sodium 127 (L) 133 - 144 mmol/L    Potassium 3.6 3.4 - 5.3 mmol/L    Chloride 89 (L) 94 - 109 mmol/L    Carbon Dioxide (CO2) 26 20 - 32 mmol/L    Anion Gap 12 3 - 14 mmol/L    Urea Nitrogen 24 7 - 30 mg/dL    Creatinine 1.18 0.66 - 1.25 mg/dL    Calcium 9.2 8.5 - 10.1 mg/dL    Glucose 107 (H) 70 - 99 mg/dL    GFR Estimate 72 >60 mL/min/1.73m2   Hepatic function panel   Result Value Ref Range    Bilirubin Total 2.1 (H) 0.2 - 1.3 mg/dL    Bilirubin Direct 0.7 (H) 0.0 - 0.2 mg/dL    Protein Total 7.9 6.8 - 8.8 g/dL    Albumin 3.2 (L) 3.4 - 5.0 g/dL    Alkaline Phosphatase 89 40 - 150 U/L     (H) 0 - 45 U/L    ALT 84 (H) 0 - 70 U/L   Lipase   Result Value Ref Range    Lipase 166 73 - 393 U/L   Extra Blue Top Tube   Result Value Ref Range    Hold Specimen JIC    Extra Red Top Tube   Result Value Ref Range    Hold Specimen JIC    Extra Green Top (Lithium Heparin) Tube   Result Value Ref Range    Hold Specimen JIC    Extra Purple Top Tube   Result Value Ref Range    Hold Specimen JIC    CBC with platelets and differential   Result Value Ref Range    WBC Count 12.5 (H) 4.0 - 11.0 10e3/uL    RBC Count 3.64 (L) 4.40 - 5.90 10e6/uL    Hemoglobin 12.1 (L) 13.3 - 17.7 g/dL    Hematocrit 34.3 (L) 40.0 - 53.0 %    MCV 94 78 - 100 fL    MCH 33.2 (H) 26.5 - 33.0 pg    MCHC 35.3 31.5 - 36.5 g/dL    RDW 12.8 10.0 - 15.0 %     Platelet Count 153 150 - 450 10e3/uL    % Neutrophils 81 %    % Lymphocytes 5 %    % Monocytes 13 %    % Eosinophils 0 %    % Basophils 0 %    % Immature Granulocytes 1 %    NRBCs per 100 WBC 0 <1 /100    Absolute Neutrophils 10.0 (H) 1.6 - 8.3 10e3/uL    Absolute Lymphocytes 0.7 (L) 0.8 - 5.3 10e3/uL    Absolute Monocytes 1.6 (H) 0.0 - 1.3 10e3/uL    Absolute Eosinophils 0.1 0.0 - 0.7 10e3/uL    Absolute Basophils 0.0 0.0 - 0.2 10e3/uL    Absolute Immature Granulocytes 0.1 (H) <=0.0 10e3/uL    Absolute NRBCs 0.0 10e3/uL   Alcohol   Result Value Ref Range    Alcohol ethyl <0.01 <=0.01 g/dL   Glucose   Result Value Ref Range    Glucose 109 (H) 70 - 99 mg/dL   Symptomatic COVID-19 Virus (Coronavirus) by PCR Nasopharyngeal    Specimen: Nasopharyngeal; Swab   Result Value Ref Range    SARS CoV2 PCR Negative Negative    Narrative    Testing was performed using the Xpert Xpress SARS-CoV-2 Assay on the  Cepheid Gene-Xpert Instrument Systems. Additional information about  this Emergency Use Authorization (EUA) assay can be found via the Lab  Guide. This test should be ordered for the detection of SARS-CoV-2 in  individuals who meet SARS-CoV-2 clinical and/or epidemiological  criteria. Test performance is unknown in asymptomatic patients. This  test is for in vitro diagnostic use under the FDA EUA for  laboratories certified under CLIA to perform high complexity testing.  This test has not been FDA cleared or approved. A negative result  does not rule out the presence of PCR inhibitors in the specimen or  target RNA in concentration below the limit of detection for the  assay. The possibility of a false negative should be considered if  the patient's recent exposure or clinical presentation suggests  COVID-19. This test was validated by the [facility]. This laboratory is certified under  the Clinical Laboratory Improvement Amendments of 1988 (CLIA-88) as  qualified to  perform high complexity laboratory testing.     EKG 12-lead   Result Value Ref Range    Systolic Blood Pressure  mmHg    Diastolic Blood Pressure  mmHg    Ventricular Rate 79 BPM    Atrial Rate 79 BPM    IN Interval 154 ms    QRS Duration 90 ms     ms    QTc 504 ms    P Axis 57 degrees    R AXIS 72 degrees    T Axis 72 degrees    Interpretation ECG       Sinus rhythm  Prolonged QT  Abnormal ECG  Unconfirmed report - interpretation of this ECG is computer generated - see medical record for final interpretation    Confirmed by - EMERGENCY ROOM, PHYSICIAN ([id number]),  MONICA HARRIS ([id number]) on 8/24/2021 6:27:24 AM     Head CT w/o contrast    Narrative    EXAM: CT HEAD W/O CONTRAST  LOCATION: [facility]  DATE/TIME: 8/23/2021 10:21 PM    INDICATION: Mental status change, unknown cause  COMPARISON: None.  TECHNIQUE: Routine CT Head without IV contrast. Multiplanar reformats. Dose reduction techniques were used.    FINDINGS:  INTRACRANIAL CONTENTS: No intracranial hemorrhage, extraaxial collection, or mass effect.  No CT evidence of acute infarct. Normal parenchymal attenuation for age. Mild generalized volume loss. No hydrocephalus.     VISUALIZED ORBITS/SINUSES/MASTOIDS: No intraorbital abnormality. No paranasal sinus mucosal disease. No middle ear or mastoid effusion.    BONES/SOFT TISSUES: No acute abnormality.      Impression    IMPRESSION:  1.  No acute intracranial process.   CT Chest/Abdomen/Pelvis w Contrast    Narrative    EXAM: CT CHEST/ABDOMEN/PELVIS W CONTRAST  LOCATION: [facility]  DATE/TIME: 8/23/2021 10:22 PM    INDICATION: Chest-abdomen-pelvis trauma, blunt. Multiple falls with decreased hemoglobin. Right upper quadrant abdominal pain and back pain. Assess for sequela of trauma.    COMPARISON: 4/22/2021.    TECHNIQUE: CT scan of the chest, abdomen and pelvis was performed following injection of IV contrast.  Multiplanar reformats were obtained. Dose reduction techniques were used.     CONTRAST: 100 mL iopamidol (Isovue-370) solution.    FINDINGS:   LUNGS AND PLEURA: Minimal to moderate linear atelectasis right lung base. Hazy ill-defined ground-glass opacity superior segment right lower lobe and right middle lobe. Hazy ill-defined ground-glass opacities and nodular infiltrates involving the   posterior inferior aspect of the right upper lobe. No pneumothorax or significant pleural fluid.    MEDIASTINUM/AXILLAE: No pneumomediastinum or significant fluid in the mediastinum. Normal heart size. No pericardial fluid. Normal caliber esophagus.    CORONARY ARTERY CALCIFICATION: Moderate.    HEPATOBILIARY: Marked hepatic steatosis. No hepatic laceration or perihepatic fluid. No calcified gallstones or biliary dilatation.    PANCREAS: Normal.    SPLEEN: Normal.    ADRENAL GLANDS: Normal.    KIDNEYS/BLADDER: Normal.    BOWEL: Normal.    LYMPH NODES: No lymphadenopathy.    VASCULATURE: No evidence for acute abdominal aortic injury. Branch vessels remain patent.    PELVIC ORGANS: No significant free fluid in the pelvis.    MUSCULOSKELETAL: Acute nondisplaced fracture lateral right 8th rib (series 8, image 266). Acute nondisplaced fracture lateral right 7th rib. Possible acute nondisplaced lateral right 6th rib fracture. Multiple other old healed right-sided rib fractures.   Sternotomy. Postthoracotomy changes anterior left 3rd rib. Posttraumatic deformity subscapular aspect of the right scapula. No definitive evidence for acute displaced pelvic fracture. No evidence for overt fracture or malalignment in the thoracic or   lumbar spine. Subcutaneous edema in proximal thighs and overlying both proximal femurs, partially visualized on the right. In the subcutaneous tissues overlying the left greater trochanter there is a tiny calcific density likely representing either old   trauma or calcification of gluteal tendons. Superior to this  there is a small subcutaneous hematoma measuring 5 cm maximal diameter.      Impression    IMPRESSION:  1.  Acute nondisplaced fractures involving the lateral aspect of the right 6th-8th ribs. Multiple other old healed fractures.    2.  Old posttraumatic and postthoracotomy as well as sternotomy deformities involving the ribs and sternum.    3.  Subcutaneous edema overlying the proximal femurs and greater trochanters bilaterally, greater on the left with a 5 cm subcutaneous hematoma overlying the left greater trochanter.    4.  No solid organ injury.    5.  Minimal to moderate atelectasis right lung base.    6.  Hazy ill-defined ground-glass opacities in the right lung as detailed above suspicious for alveolar infiltrates related to pneumonia. Underlying aspiration pneumonitis could have this appearance. Given its unilaterality, this would be an atypical   presentation for COVID-19 pneumonia, but that cannot be completely excluded.    7.  Marked hepatic steatosis.   Cervical spine CT w/o contrast    Narrative    EXAM: CT CERVICAL SPINE W/O CONTRAST  LOCATION: [facility]  DATE/TIME: 8/23/2021 10:21 PM    INDICATION: Traumatic injury  COMPARISON: None.  TECHNIQUE: Routine CT Cervical Spine without IV contrast. Multiplanar reformats. Dose reduction techniques were used.    FINDINGS:  VERTEBRA: Mild reversal and left apex curvature of the cervical spine. No spondylolisthesis. Vertebral body heights are preserved. No fracture or posttraumatic subluxation.     CANAL/FORAMINA: Multilevel degenerative changes at least mild canal stenosis at C3-C4. Multilevel bilateral neural foraminal narrowing, with moderate to severe right-sided stenosis at C3-C4 and at least mild bilateral stenosis at C4-C5.    PARASPINAL: No acute extraspinal abnormality. Atherosclerotic calcification of the carotid bifurcations. Visualized portions of the lung apices are clear.      Impression     IMPRESSION:  1.  Degenerative changes of the cervical spine. No evidence of an acute displaced fracture.   CT Thoracic Spine w/o Contrast    Narrative    EXAM: CT THORACIC SPINE W/O CONTRAST  LOCATION: [facility]  DATE/TIME: 8/23/2021 10:21 PM    INDICATION: Traumatic injury.    COMPARISON: CT abdomen and pelvis dated 4/22/2021.    TECHNIQUE: Dedicated axial, sagittal, and coronal images of the Thoracic Spine were generated utilizing CT chest source data and are separately reviewed. Dose reduction techniques were used.     FINDINGS:  VERTEBRA: Left apex curvature of the thoracic spine. Asymmetric right-sided compression deformity at T6 resulting in approximately 15% right lateral height loss. Given the lack of overlying soft tissue swelling, findings may be chronic in age. There is a   chronic mild superior endplate compression deformity at T9 that is unchanged from the prior CT abdomen and pelvis. Chronic deformities of the right 9th through 10th posterior right ribs at the costovertebral junction. Chronic deformity of the left 10th   rib at the costovertebral junction.     CANAL/FORAMINA: Mild degenerative changes without osseous spinal canal or neural foraminal narrowing.    PARASPINAL: See separately dictated chest CT for intrathoracic findings.      Impression    IMPRESSION:  1.  Chronic-appearing mild compression deformity at T6.  2.  Stable mild chronic superior endplate compression deformity at T9.  3.  No high grade osseous spinal canal or neural foraminal narrowing.     Lumbar spine CT w/o contrast    Narrative    EXAM: CT LUMBAR SPINE W/O CONTRAST  LOCATION: [facility]  DATE/TIME: 8/23/2021 10:21 PM    INDICATION: Low back pain, trauma.    COMPARISON: CT abdomen and pelvis dated 4/22/2021.    TECHNIQUE: Routine CT Lumbar Spine without IV contrast. Multiplanar reformats. Dose reduction techniques were used.      FINDINGS:  VERTEBRA: Right apex curvature of the upper lumbar spine and left apex curvature of the lower lumbar spine. Grade 1 anterolisthesis of L2 on L3. Vertebral body heights are preserved. No fracture or posttraumatic subluxation.     CANAL/FORAMINA: Multilevel degenerative changes without high grade spinal canal stenosis. There is at least moderate neural foraminal narrowing on the right at L4-L5 and bilaterally at L5-S1.    PARASPINAL: See separately dictated CT abdomen and pelvis for intra-abdominal findings.      Impression    IMPRESSION:  1.  No acute osseous abnormality of the lumbar spine.  2.  Degenerative changes as described.   UA reflex to Microscopic and Culture    Specimen: Urine, Midstream   Result Value Ref Range    Color Urine Orange (A) Colorless, Straw, Light Yellow, Yellow    Appearance Urine Slightly Cloudy (A) Clear    Glucose Urine Negative Negative mg/dL    Bilirubin Urine Small (A) Negative    Ketones Urine 10  (A) Negative mg/dL    Specific Gravity Urine 1.025 1.003 - 1.035    Blood Urine Large (A) Negative    pH Urine 6.0 5.0 - 7.0    Protein Albumin Urine 200  (A) Negative mg/dL    Urobilinogen Urine Normal Normal, 2.0 mg/dL    Nitrite Urine Negative Negative    Leukocyte Esterase Urine Small (A) Negative    Mucus Urine Present (A) None Seen /LPF    RBC Urine 12 (H) <=2 /HPF    WBC Urine 11 (H) <=5 /HPF    Transitional Epithelials Urine <1 <=1 /HPF    Hyaline Casts Urine 72 (H) <=2 /LPF    Narrative    Urine Culture ordered based on laboratory criteria   Glucose by meter   Result Value Ref Range    GLUCOSE BY METER POCT 93 70 - 99 mg/dL   INR   Result Value Ref Range    INR 1.08 0.85 - 1.15       Studies:  Lumbar spine CT w/o contrast   Final Result   IMPRESSION:   1.  No acute osseous abnormality of the lumbar spine.   2.  Degenerative changes as described.      CT Thoracic Spine w/o Contrast   Final Result   IMPRESSION:   1.  Chronic-appearing mild compression deformity at T6.    2.  Stable mild chronic superior endplate compression deformity at T9.   3.  No high grade osseous spinal canal or neural foraminal narrowing.         Cervical spine CT w/o contrast   Final Result   IMPRESSION:   1.  Degenerative changes of the cervical spine. No evidence of an acute displaced fracture.      CT Chest/Abdomen/Pelvis w Contrast   Final Result   IMPRESSION:   1.  Acute nondisplaced fractures involving the lateral aspect of the right 6th-8th ribs. Multiple other old healed fractures.      2.  Old posttraumatic and postthoracotomy as well as sternotomy deformities involving the ribs and sternum.      3.  Subcutaneous edema overlying the proximal femurs and greater trochanters bilaterally, greater on the left with a 5 cm subcutaneous hematoma overlying the left greater trochanter.      4.  No solid organ injury.      5.  Minimal to moderate atelectasis right lung base.      6.  Hazy ill-defined ground-glass opacities in the right lung as detailed above suspicious for alveolar infiltrates related to pneumonia. Underlying aspiration pneumonitis could have this appearance. Given its unilaterality, this would be an atypical    presentation for COVID-19 pneumonia, but that cannot be completely excluded.      7.  Marked hepatic steatosis.      Head CT w/o contrast   Final Result   IMPRESSION:   1.  No acute intracranial process.      POC US ABDOMEN LIMITED    (Results Pending)     Discussed with trauma staff on call, Dr. Coe.    Bradford Mendoza MD  [facility]

## 2021-08-24 NOTE — PROGRESS NOTES
[facility]  Trauma Service Progress Note    Date of Service (when I saw the patient): 08/24/2021     Assessment & Plan   Trauma mechanism: multiple falls  Time/date of injury: last 48 hours  Known Injuries:  1. Right rib fractures 6-8  2. Left sided hip hematoma  3. Right hip hematoma     Other diagnoses:   1. Etoh Withdrawal  2. Hyponatremia  3. Alcoholic hepatitis  4.  Community acquired PNA    Tertiary exam completed, no other injuries identified other than those listed above  Major plans today:  High dose Gabapentin taper  Pain control for rib fractures  Pulmonary hygiene  Diet  Levaquin for CAP  Repeat CMP  EKG, QTc trend    Neuro/Pain:  # Acute traumatic pain   # Hx anxiety/ insomnia  # Hx alcohol misuse with early alcohol withdrawal symptoms  -Scheduled tylenol, Lidoderm patches, high dose Gabapentin for withdrawal and pain control  -PRN Flexeril for muscle spasms  - Resumed PTA Sertraline  States he drinks a liter of liquor daily last alcohol drink was 2 days prior to admission (Sunday), presented with signs of withdrawal  CIWA-Ar protocol with alcohol withdrawal: Received a dose of Phenobarbital @ 0100 for early withdrawal symptoms  Addiction medicine consult  Regional anesthesia consult, evaluate for regional pain block  - Monitor neurological status. Delirium prevention and precautions.     Pulmonary:  # Traumatic rib fractures:  # Community acquired pneumonia  Rib fracture protocol  Right middle/lower lobe infiltrates post traumatic rib fractures, hypoxia, with associated leukocytosis, will treat for community acquired pneumonia, started Levaquin today  Pain control as above  - Supplemental oxygen to keep saturation above 92 %.  Incentive spirometer while awake     Cardiovascular:    - Prolonged Qtc, repeat EKG today with initiation of QTC prolonging medications, 504ms-->493  - No cardiac meds PTA per patient  GI/Nutrition:    # Alcoholic hepatitis without  ascites  #Transaminitis  Long history of alcohol misuse, CT abdomen hepatic steatosis, no ascites, denies abdominal pain, total bilirubin 2.1, INR 1.08.   - Hepatic panel daily  - Regular diet    Fluids/Electrolytes/Renal  #Hyponatremia  - IV bolus given earlier, 0.9NS infusion  Recheck BMP, mag and phos now  - Trend sodium levels  - electrolyte replacement protocol  In place.     Endocrine:  #Hypothyroidism  - PTA medications: Levothyroxine resumed    Infectious disease:   # Community acquired Pneumonia   - Antibiotics:Levaquin 750mg daily, concern for QTc prolongation transition to Ceftriaxone +Doxycycline starting tomorrow, received a dose of Levaquin today  - Urine cultures pending    Hematology:    # Chronic anemia  - Hemoglobin 12.1, INR 1.08. Monitor and trend.   Threshold for transfusion if hgb less than 7.0 or signs/symptoms of hypoperfusion.       Musculoskeletal:  #Hx sternal fracture S/P repair 2012  # traumatic rib fractures  No mobility restrictions  Early mobility  PT/OT eval    Skin:  #Large left greater than right thigh hematoma's  Supportive cares  Lidoderm patches available for pain  - dilgent cares to prevent skin breakdown and wound formation.      Lines/ tubes/ drains:  - PIV  I asked to patient if he wanted me to call any family or friend for an update and he declined stating I have updated every one needed.  General Cares:    PPI/H2 blocker:  n/a   DVT prophylaxis: Lovenox   Bowel Regimen/Date of last stool: PTA   Pulmonary toilet: IS   ETOH screen completed yes   Lines / drains: PIV    Code status:  Full code   Expected D/C date: TBD pending pain control and improvement in withdrawal symptoms    Interval History   Endorses right lateral rib pain with cough, denies feeling short of breath. Denies headache or abdominal pain  ROS x 8 negative with exception of those things listed in interval hx    Physical Exam   Temp: 99.2  F (37.3  C) Temp src: Oral BP: (!) 126/99 Pulse: 77   Resp: 18 SpO2:  98 % O2 Device: Nasal cannula Oxygen Delivery: 2 LPM  Vitals:    08/23/21 1320   Weight: 73.5 kg (162 lb)     Vital Signs with Ranges  Temp:  [99.2  F (37.3  C)] 99.2  F (37.3  C)  Pulse:  [] 77  Resp:  [14-25] 18  BP: (103-154)/() 126/99  SpO2:  [84 %-99 %] 98 %  No intake/output data recorded.    [place] Coma Scale - Total 14/15  Eye Response (E): 4   4= spontaneous, 3= to verbal/voice, 2= to pain, 1= No response   Verbal Response (V): 4   5= Orientated, converses, 4= Confused, converses, 3= Inappropriate words, 2= Incomprehensible sounds, 1=No response   Motor Response (M): 6   6= Obeys commands, 5= Localizes to pain, 4= Withdrawal to pain, 3=Fexion to pain, 2= Extension to pain, 1= No response   Constitutional: Lethargic, when awake responds appropraitely  Eyes: Lids and lashes normal, pupils equal, round and reactive to light, extra ocular muscles intact, sclera clear, conjunctiva normal.  ENT: Normocephalic, atraumatic  Respiratory: No increased work of breathing, good air exchange, clear to auscultation bilaterally, no crackles or wheezing.  Cardiovascular:  regular rate and rhythm, normal S1 and S2 and no murmur.   GI: Normal bowel sounds, abdomen soft, non-distended, non-tender, no guarding  Genitourinary:  Orange and clear  Skin: Large left thigh hematoma >right thigh  Musculoskeletal: There is no redness, warmth, or swelling of the joints. +2 Pedal pulse palpated.  Neurologic: Moves all extremities to command, very tremulous, No focal deficits.  Neuropsychiatric: tremulous, impulsive    CHUCK Ballard CNP  To contact the trauma service use job code pager [phone],   Numeric texts or alpha text through [facility]

## 2021-08-24 NOTE — PLAN OF CARE
Reason for admission: multiple falls with alcohol withdrawal   Admitted from: [facility]  Report received from: ED RN: Mayela Lerma RN skin assessment completed by: not yet  Bed Algorithm reevaluated: not yet  Was Pulsate ordered?:not evaluated yet  Care plan (primary problem) and Education initiated: Yes     Pt arrived on 7B from [facility] around 1550 very sleepy, but arouseable by voice. Pt came with cell phone w/ , cloth mask, clothes, black bag with home meds, meds sent to security.

## 2021-08-24 NOTE — PROGRESS NOTES
08/24/21 1000   Quick Adds   Type of Visit Initial Occupational Therapy Evaluation   Living Environment   People in home alone   Current Living Arrangements apartment   Home Accessibility no concerns   Transportation Anticipated car, drives self   Self-Care   Usual Activity Tolerance good   Current Activity Tolerance poor   Regular Exercise Yes   Activity/Exercise Type biking   Exercise Amount/Frequency daily   Equipment Currently Used at Home none   Instrumental Activities of Daily Living (IADL)   Previous Responsibilities meal prep;housekeeping;laundry;shopping;medication management;finances;driving   Disability/Function   Hearing Difficulty or Deaf no   Wear Glasses or Blind yes   Vision Management n   Concentrating, Remembering or Making Decisions Difficulty no   Difficulty Communicating no   Difficulty Eating/Swallowing no   Walking or Climbing Stairs Difficulty no   Dressing/Bathing Difficulty no   Toileting issues no   Doing Errands Independently Difficulty (such as shopping) no   Fall history within last six months yes   Number of times patient has fallen within last six months   (multiple)   Change in Functional Status Since Onset of Current Illness/Injury yes   General Information   Referring Physician markell Grissom   Patient/Family Therapy Goal Statement (OT) stop drinking.    Additional Occupational Profile Info/Pertinent History of Current Problem Ming Uribe is a 50 year old male who presents with history of alcoholic hepatitis presented with abdominal pain, back pain, nausea and vomiting. He usually drinks 1L a day, but not in last day. He has had withdrawal in the past. He admitted to falling several times in the past few days. He is a poor historian and is actively withdrawing. He was given phenobarbital and is on [facility] protocol. He was scanned and found to have 3 rib fractures on the right   Existing Precautions/Restrictions fall   General Observations and Info pt with notable tremor  and cognitive deficits.    Cognitive Status Examination   Orientation Status orientation to person, place and time   Affect/Mental Status (Cognitive) confused   Follows Commands follows one-step commands;over 90% accuracy   Memory Deficit moderate deficit   Cognitive Status Comments pt with overall deficits in judgement and concentration. further testing required.    Visual Perception   Visual Impairment/Limitations blurry vision   Sensory   Sensory Quick Adds No deficits were identified   Range of Motion Comprehensive   General Range of Motion no range of motion deficits identified   Strength Comprehensive (MMT)   General Manual Muscle Testing (MMT) Assessment other (see comments)   Comment, General Manual Muscle Testing (MMT) Assessment overall deconditioning   Coordination   Coordination Comments overall gross and fine motor deficits, difficulty writing with pencil and donning glasses.    Activities of Daily Living   BADL Assessment lower body dressing   Lower Body Dressing Assessment   Comment (Lower Body Dressing) mod assist donning socks in bed.    Clinical Impression   Criteria for Skilled Therapeutic Interventions Met (OT) yes   OT Diagnosis decreased ADL I   Assessment of Occupational Performance 5 or more Performance Deficits   Identified Performance Deficits dressing, bathing, tioleting, G/H, home making, leisure, meduication management, cooking.    Planned Therapy Interventions (OT) ADL retraining;IADL retraining;cognition;strengthening;transfer training;home program guidelines;progressive activity/exercise;risk factor education   Clinical Decision Making Complexity (OT) low complexity   Therapy Frequency (OT) 3x/week   Predicted Duration of Therapy 2 weeks   Risk & Benefits of therapy have been explained evaluation/treatment results reviewed;care plan/treatment goals reviewed;risks/benefits reviewed;current/potential barriers reviewed;participants voiced agreement with care plan;participants  included;patient   Comment-Clinical Impression Pt presents to OT with cognitive deficits and deconditioning leading to decreased ADL I   OT Discharge Planning    OT Discharge Recommendation (DC Rec) Home with assist;Transitional Care Facility   OT Rationale for DC Rec pending progress.    OT Brief overview of current status  Pt scoring 16 on SLUMS today indicating dementia like cognitive skills. PT would likely benefit from CD counseling to stop drinking behavior.    Total Evaluation Time (Minutes)   Total Evaluation Time (Minutes) 5

## 2021-08-25 ENCOUNTER — APPOINTMENT (OUTPATIENT)
Dept: PHYSICAL THERAPY | Facility: CLINIC | Age: 51
DRG: 183 | End: 2021-08-25
Attending: STUDENT IN AN ORGANIZED HEALTH CARE EDUCATION/TRAINING PROGRAM
Payer: COMMERCIAL

## 2021-08-25 ENCOUNTER — APPOINTMENT (OUTPATIENT)
Dept: GENERAL RADIOLOGY | Facility: CLINIC | Age: 51
DRG: 183 | End: 2021-08-25
Attending: NURSE PRACTITIONER
Payer: COMMERCIAL

## 2021-08-25 LAB
ALBUMIN SERPL-MCNC: 2.2 G/DL (ref 3.4–5)
ALP SERPL-CCNC: 78 U/L (ref 40–150)
ALT SERPL W P-5'-P-CCNC: 52 U/L (ref 0–70)
ANION GAP SERPL CALCULATED.3IONS-SCNC: 11 MMOL/L (ref 3–14)
AST SERPL W P-5'-P-CCNC: 83 U/L (ref 0–45)
BACTERIA UR CULT: NO GROWTH
BASOPHILS # BLD AUTO: 0 10E3/UL (ref 0–0.2)
BASOPHILS NFR BLD AUTO: 1 %
BILIRUB SERPL-MCNC: 1 MG/DL (ref 0.2–1.3)
BUN SERPL-MCNC: 17 MG/DL (ref 7–30)
CALCIUM SERPL-MCNC: 8 MG/DL (ref 8.5–10.1)
CHLORIDE BLD-SCNC: 99 MMOL/L (ref 94–109)
CO2 SERPL-SCNC: 21 MMOL/L (ref 20–32)
CREAT SERPL-MCNC: 0.7 MG/DL (ref 0.66–1.25)
EOSINOPHIL # BLD AUTO: 0.1 10E3/UL (ref 0–0.7)
EOSINOPHIL NFR BLD AUTO: 1 %
ERYTHROCYTE [DISTWIDTH] IN BLOOD BY AUTOMATED COUNT: 12.8 % (ref 10–15)
GFR SERPL CREATININE-BSD FRML MDRD: >90 ML/MIN/1.73M2
GLUCOSE BLD-MCNC: 70 MG/DL (ref 70–99)
GLUCOSE BLDC GLUCOMTR-MCNC: 110 MG/DL (ref 70–99)
GLUCOSE BLDC GLUCOMTR-MCNC: 117 MG/DL (ref 70–99)
GLUCOSE BLDC GLUCOMTR-MCNC: 130 MG/DL (ref 70–99)
GLUCOSE BLDC GLUCOMTR-MCNC: 69 MG/DL (ref 70–99)
GLUCOSE BLDC GLUCOMTR-MCNC: 85 MG/DL (ref 70–99)
HCT VFR BLD AUTO: 28.4 % (ref 40–53)
HGB BLD-MCNC: 9.6 G/DL (ref 13.3–17.7)
IMM GRANULOCYTES # BLD: 0.1 10E3/UL
IMM GRANULOCYTES NFR BLD: 1 %
INR PPP: 1.04 (ref 0.85–1.15)
LYMPHOCYTES # BLD AUTO: 0.5 10E3/UL (ref 0.8–5.3)
LYMPHOCYTES NFR BLD AUTO: 8 %
MAGNESIUM SERPL-MCNC: 1.9 MG/DL (ref 1.6–2.3)
MCH RBC QN AUTO: 33.2 PG (ref 26.5–33)
MCHC RBC AUTO-ENTMCNC: 33.8 G/DL (ref 31.5–36.5)
MCV RBC AUTO: 98 FL (ref 78–100)
MONOCYTES # BLD AUTO: 1 10E3/UL (ref 0–1.3)
MONOCYTES NFR BLD AUTO: 15 %
NEUTROPHILS # BLD AUTO: 4.6 10E3/UL (ref 1.6–8.3)
NEUTROPHILS NFR BLD AUTO: 74 %
NRBC # BLD AUTO: 0 10E3/UL
NRBC BLD AUTO-RTO: 0 /100
PHOSPHATE SERPL-MCNC: 1.9 MG/DL (ref 2.5–4.5)
PLATELET # BLD AUTO: 150 10E3/UL (ref 150–450)
POTASSIUM BLD-SCNC: 3.6 MMOL/L (ref 3.4–5.3)
PROT SERPL-MCNC: 5.7 G/DL (ref 6.8–8.8)
RBC # BLD AUTO: 2.89 10E6/UL (ref 4.4–5.9)
SODIUM SERPL-SCNC: 131 MMOL/L (ref 133–144)
WBC # BLD AUTO: 6.3 10E3/UL (ref 4–11)

## 2021-08-25 PROCEDURE — 120N000002 HC R&B MED SURG/OB UMMC

## 2021-08-25 PROCEDURE — 250N000013 HC RX MED GY IP 250 OP 250 PS 637: Performed by: NURSE PRACTITIONER

## 2021-08-25 PROCEDURE — 71045 X-RAY EXAM CHEST 1 VIEW: CPT | Mod: 26 | Performed by: RADIOLOGY

## 2021-08-25 PROCEDURE — 85025 COMPLETE CBC W/AUTO DIFF WBC: CPT | Performed by: STUDENT IN AN ORGANIZED HEALTH CARE EDUCATION/TRAINING PROGRAM

## 2021-08-25 PROCEDURE — 97530 THERAPEUTIC ACTIVITIES: CPT | Mod: GP

## 2021-08-25 PROCEDURE — 250N000011 HC RX IP 250 OP 636: Performed by: STUDENT IN AN ORGANIZED HEALTH CARE EDUCATION/TRAINING PROGRAM

## 2021-08-25 PROCEDURE — 36415 COLL VENOUS BLD VENIPUNCTURE: CPT | Performed by: STUDENT IN AN ORGANIZED HEALTH CARE EDUCATION/TRAINING PROGRAM

## 2021-08-25 PROCEDURE — 99223 1ST HOSP IP/OBS HIGH 75: CPT | Performed by: INTERNAL MEDICINE

## 2021-08-25 PROCEDURE — 250N000013 HC RX MED GY IP 250 OP 250 PS 637: Performed by: SURGERY

## 2021-08-25 PROCEDURE — 250N000013 HC RX MED GY IP 250 OP 250 PS 637: Performed by: INTERNAL MEDICINE

## 2021-08-25 PROCEDURE — 83735 ASSAY OF MAGNESIUM: CPT | Performed by: STUDENT IN AN ORGANIZED HEALTH CARE EDUCATION/TRAINING PROGRAM

## 2021-08-25 PROCEDURE — 94150 VITAL CAPACITY TEST: CPT

## 2021-08-25 PROCEDURE — 97110 THERAPEUTIC EXERCISES: CPT | Mod: GP

## 2021-08-25 PROCEDURE — 99232 SBSQ HOSP IP/OBS MODERATE 35: CPT | Performed by: NURSE PRACTITIONER

## 2021-08-25 PROCEDURE — 250N000013 HC RX MED GY IP 250 OP 250 PS 637: Performed by: STUDENT IN AN ORGANIZED HEALTH CARE EDUCATION/TRAINING PROGRAM

## 2021-08-25 PROCEDURE — 999N000127 HC STATISTIC PERIPHERAL IV START W US GUIDANCE

## 2021-08-25 PROCEDURE — 85610 PROTHROMBIN TIME: CPT | Performed by: STUDENT IN AN ORGANIZED HEALTH CARE EDUCATION/TRAINING PROGRAM

## 2021-08-25 PROCEDURE — 80051 ELECTROLYTE PANEL: CPT | Performed by: STUDENT IN AN ORGANIZED HEALTH CARE EDUCATION/TRAINING PROGRAM

## 2021-08-25 PROCEDURE — 99207 PR CONSULT E&M CHANGED TO INITIAL LEVEL: CPT | Performed by: INTERNAL MEDICINE

## 2021-08-25 PROCEDURE — 97161 PT EVAL LOW COMPLEX 20 MIN: CPT | Mod: GP

## 2021-08-25 PROCEDURE — 71045 X-RAY EXAM CHEST 1 VIEW: CPT

## 2021-08-25 PROCEDURE — 250N000011 HC RX IP 250 OP 636: Performed by: NURSE PRACTITIONER

## 2021-08-25 PROCEDURE — 84100 ASSAY OF PHOSPHORUS: CPT | Performed by: STUDENT IN AN ORGANIZED HEALTH CARE EDUCATION/TRAINING PROGRAM

## 2021-08-25 PROCEDURE — 80053 COMPREHEN METABOLIC PANEL: CPT | Performed by: STUDENT IN AN ORGANIZED HEALTH CARE EDUCATION/TRAINING PROGRAM

## 2021-08-25 PROCEDURE — 999N000157 HC STATISTIC RCP TIME EA 10 MIN

## 2021-08-25 RX ORDER — NALOXONE HYDROCHLORIDE 0.4 MG/ML
0.2 INJECTION, SOLUTION INTRAMUSCULAR; INTRAVENOUS; SUBCUTANEOUS
Status: DISCONTINUED | OUTPATIENT
Start: 2021-08-25 | End: 2021-08-28 | Stop reason: HOSPADM

## 2021-08-25 RX ORDER — NALOXONE HYDROCHLORIDE 0.4 MG/ML
0.4 INJECTION, SOLUTION INTRAMUSCULAR; INTRAVENOUS; SUBCUTANEOUS
Status: DISCONTINUED | OUTPATIENT
Start: 2021-08-25 | End: 2021-08-28 | Stop reason: HOSPADM

## 2021-08-25 RX ORDER — MAGNESIUM OXIDE 400 MG/1
400 TABLET ORAL 2 TIMES DAILY
Status: DISCONTINUED | OUTPATIENT
Start: 2021-08-25 | End: 2021-08-25

## 2021-08-25 RX ORDER — GABAPENTIN 600 MG/1
600 TABLET ORAL 3 TIMES DAILY
Status: DISCONTINUED | OUTPATIENT
Start: 2021-08-25 | End: 2021-08-28 | Stop reason: HOSPADM

## 2021-08-25 RX ORDER — POTASSIUM CHLORIDE 750 MG/1
20 TABLET, EXTENDED RELEASE ORAL ONCE
Status: COMPLETED | OUTPATIENT
Start: 2021-08-25 | End: 2021-08-25

## 2021-08-25 RX ORDER — MAGNESIUM OXIDE 400 MG/1
400 TABLET ORAL 2 TIMES DAILY
Status: COMPLETED | OUTPATIENT
Start: 2021-08-25 | End: 2021-08-27

## 2021-08-25 RX ORDER — OXYCODONE HYDROCHLORIDE 5 MG/1
5 TABLET ORAL
Status: DISCONTINUED | OUTPATIENT
Start: 2021-08-25 | End: 2021-08-28 | Stop reason: HOSPADM

## 2021-08-25 RX ORDER — POTASSIUM CHLORIDE 750 MG/1
40 TABLET, EXTENDED RELEASE ORAL ONCE
Status: DISCONTINUED | OUTPATIENT
Start: 2021-08-25 | End: 2021-08-25 | Stop reason: CLARIF

## 2021-08-25 RX ADMIN — THIAMINE HCL TAB 100 MG 200 MG: 100 TAB at 10:52

## 2021-08-25 RX ADMIN — Medication 400 MG: at 19:50

## 2021-08-25 RX ADMIN — FLUTICASONE FUROATE AND VILANTEROL TRIFENATATE 1 PUFF: 200; 25 POWDER RESPIRATORY (INHALATION) at 09:37

## 2021-08-25 RX ADMIN — CYCLOBENZAPRINE HYDROCHLORIDE 5 MG: 5 TABLET, FILM COATED ORAL at 00:13

## 2021-08-25 RX ADMIN — CLONIDINE HYDROCHLORIDE 0.1 MG: 0.1 TABLET ORAL at 18:26

## 2021-08-25 RX ADMIN — DOCUSATE SODIUM 50 MG AND SENNOSIDES 8.6 MG 2 TABLET: 8.6; 5 TABLET, FILM COATED ORAL at 19:50

## 2021-08-25 RX ADMIN — ENOXAPARIN SODIUM 40 MG: 40 INJECTION SUBCUTANEOUS at 16:18

## 2021-08-25 RX ADMIN — HYDROMORPHONE HYDROCHLORIDE 0.2 MG: 0.2 INJECTION, SOLUTION INTRAMUSCULAR; INTRAVENOUS; SUBCUTANEOUS at 15:03

## 2021-08-25 RX ADMIN — CYCLOBENZAPRINE HYDROCHLORIDE 5 MG: 5 TABLET, FILM COATED ORAL at 13:09

## 2021-08-25 RX ADMIN — SERTRALINE HYDROCHLORIDE 100 MG: 100 TABLET ORAL at 10:51

## 2021-08-25 RX ADMIN — HYDROMORPHONE HYDROCHLORIDE 0.2 MG: 0.2 INJECTION, SOLUTION INTRAMUSCULAR; INTRAVENOUS; SUBCUTANEOUS at 13:09

## 2021-08-25 RX ADMIN — CEFTRIAXONE SODIUM 2 G: 2 INJECTION, POWDER, FOR SOLUTION INTRAMUSCULAR; INTRAVENOUS at 11:14

## 2021-08-25 RX ADMIN — PANTOPRAZOLE SODIUM 40 MG: 40 TABLET, DELAYED RELEASE ORAL at 08:48

## 2021-08-25 RX ADMIN — Medication 500 MG: at 10:47

## 2021-08-25 RX ADMIN — LEVOTHYROXINE SODIUM 150 MCG: 0.15 TABLET ORAL at 06:23

## 2021-08-25 RX ADMIN — POTASSIUM CHLORIDE 20 MEQ: 750 TABLET, EXTENDED RELEASE ORAL at 16:18

## 2021-08-25 RX ADMIN — GABAPENTIN 900 MG: 300 CAPSULE ORAL at 18:26

## 2021-08-25 RX ADMIN — DOXYCYCLINE HYCLATE 100 MG: 100 CAPSULE ORAL at 10:47

## 2021-08-25 RX ADMIN — GABAPENTIN 900 MG: 300 CAPSULE ORAL at 10:48

## 2021-08-25 RX ADMIN — MONTELUKAST 10 MG: 10 TABLET, FILM COATED ORAL at 22:30

## 2021-08-25 RX ADMIN — POTASSIUM & SODIUM PHOSPHATES POWDER PACK 280-160-250 MG 1 PACKET: 280-160-250 PACK at 14:54

## 2021-08-25 RX ADMIN — GABAPENTIN 900 MG: 300 CAPSULE ORAL at 03:25

## 2021-08-25 RX ADMIN — ACETAMINOPHEN 650 MG: 325 TABLET, FILM COATED ORAL at 18:25

## 2021-08-25 RX ADMIN — CLONIDINE HYDROCHLORIDE 0.1 MG: 0.1 TABLET ORAL at 10:46

## 2021-08-25 RX ADMIN — LIDOCAINE 3 PATCH: 246 PATCH TOPICAL at 09:42

## 2021-08-25 RX ADMIN — DOXYCYCLINE HYCLATE 100 MG: 100 CAPSULE ORAL at 22:30

## 2021-08-25 RX ADMIN — FOLIC ACID 1 MG: 1 TABLET ORAL at 10:53

## 2021-08-25 RX ADMIN — Medication 500 MG: at 18:26

## 2021-08-25 RX ADMIN — POTASSIUM & SODIUM PHOSPHATES POWDER PACK 280-160-250 MG 1 PACKET: 280-160-250 PACK at 09:39

## 2021-08-25 RX ADMIN — DOCUSATE SODIUM 50 MG AND SENNOSIDES 8.6 MG 2 TABLET: 8.6; 5 TABLET, FILM COATED ORAL at 10:54

## 2021-08-25 RX ADMIN — MULTIPLE VITAMINS W/ MINERALS TAB 1 TABLET: TAB at 09:38

## 2021-08-25 ASSESSMENT — ACTIVITIES OF DAILY LIVING (ADL)
ADLS_ACUITY_SCORE: 18
ADLS_ACUITY_SCORE: 20
ADLS_ACUITY_SCORE: 19

## 2021-08-25 NOTE — PROGRESS NOTES
08/25/21 1100   Quick Adds   Type of Visit Initial PT Evaluation   Living Environment   People in home alone   Current Living Arrangements apartment   Home Accessibility no concerns   Transportation Anticipated car, drives self   Living Environment Comments Pt lives in apartment alone, elevator access.    Self-Care   Usual Activity Tolerance good   Current Activity Tolerance fair   Regular Exercise No   Equipment Currently Used at Home none   Activity/Exercise/Self-Care Comment Per pt he was IND with ADL's.   Per pt prior to hosipitalization liked to be active but less so recently.    Disability/Function   Hearing Difficulty or Deaf no   Wear Glasses or Blind yes   Vision Management reading glasses    Concentrating, Remembering or Making Decisions Difficulty no   Difficulty Communicating no   Difficulty Eating/Swallowing no   Walking or Climbing Stairs Difficulty no   Dressing/Bathing Difficulty no   Toileting issues no   Doing Errands Independently Difficulty (such as shopping) no   Fall history within last six months yes   Number of times patient has fallen within last six months   (many)   Change in Functional Status Since Onset of Current Illness/Injury yes   General Information   Onset of Illness/Injury or Date of Surgery 08/23/21   Patient/Family Therapy Goals Statement (PT) Pt wants to get stronger    Pertinent History of Current Problem (include personal factors and/or comorbidities that impact the POC) 50 year old male who presents with history of alcoholic hepatitis presented with abdominal pain, back pain, nausea and vomiting. He usually drinks 1L a day, but not in last day. He has had withdrawal in the past. He admitted to falling several times in the past few days. He is a poor historian and is actively withdrawing. He was given phenobarbital and is on CIWA protocol. He was scanned and found to have 3 rib fractures on the right.     Existing Precautions/Restrictions fall   Weight-Bearing Status - CHEIKH  full weight-bearing   Weight-Bearing Status - RUE full weight-bearing   Weight-Bearing Status - LLE full weight-bearing   Weight-Bearing Status - RLE full weight-bearing   General Observations Activity: up with assist   Cognition   Orientation Status (Cognition) oriented x 3   Affect/Mental Status (Cognition) WFL   Follows Commands (Cognition) follows one-step commands   Safety Deficit (Cognition) impulsivity;insight into deficits/self-awareness   Pain Assessment   Patient Currently in Pain Yes, see Vital Sign flowsheet   Integumentary/Edema   Integumentary/Edema Comments Large bruises on B hips   Posture    Posture Protracted shoulders;Forward head position   Range of Motion (ROM)   ROM Quick Adds ROM WFL   Strength   Strength Comments B LE weakness noted, more generalized, shaking noted    Bed Mobility   Comment (Bed Mobility) Min-modA for bed mobility   Transfers   Transfer Safety Comments CGA with walker for sit<>stand, cues for safety    Gait/Stairs (Locomotion)   Comment (Gait/Stairs) Able to march in place with Abimael (slight posterior lean)    Balance   Balance Comments IND sitting balance, Abimael for standing balance with walker (posterior lean)    Sensory Examination   Sensory Perception WFL   Coordination   Coordination no deficits were identified   Muscle Tone   Muscle Tone no deficits were identified   Clinical Impression   Criteria for Skilled Therapeutic Intervention yes, treatment indicated;meets criteria   PT Diagnosis (PT) Impaired functional mobility   Influenced by the following impairments Decreased strength, balance and activity tolerance   Functional limitations due to impairments Inability to comlete functional mobility at baseline level of functioning    Clinical Presentation Stable/Uncomplicated   Clinical Presentation Rationale Medically stable, 2L NC, more alert    Clinical Decision Making (Complexity) low complexity   Therapy Frequency (PT) 5x/week   Predicted Duration of Therapy  Intervention (days/wks) 1 week    Planned Therapy Interventions (PT) balance training;bed mobility training;gait training;strengthening;stretching;ROM (range of motion);transfer training;risk factor education;progressive activity/exercise   Risk & Benefits of therapy have been explained evaluation/treatment results reviewed;care plan/treatment goals reviewed;risks/benefits reviewed;current/potential barriers reviewed;participants voiced agreement with care plan;participants included;patient   Clinical Impression Comments Pt would benefit from IP PT to progress strength and IND/safety with functional mobility.    PT Discharge Planning    PT Discharge Recommendation (DC Rec) Transitional Care Facility   PT Rationale for DC Rec Pt below baseline level of functioning with mobility, unsafe to d/c home, increased risk of falls with weakness and impaired balance    PT Brief overview of current status  Min-modA for bed mob, walker for transfers   Total Evaluation Time   Total Evaluation Time (Minutes) 10

## 2021-08-25 NOTE — PLAN OF CARE
Time: Day shift 8/25  Status: Stable  NEURO: Alert and oriented x4. Slight tremors bilateral upper arms  RESPIRATORY: Lungs diminished in bases. O2 sats 93% on 2L  CARDIAC: HR 80s and regular  GI/: Bowel sounds positive x4. BM yesterday  DIET: Tolerating regular diet  PAIN/NAUSEA: R hip, back and flank pain. Dilaudid 0.2 given x2. Flexeril given x1. Oxycodone now available  INCISION/DRAIN/SKIN: Large dark purple bruise over R hip area.  IV ACCESS: PIV. MIV discontinued today.  ACTIVITY: OOB with PT this AM.  LAB:   PLAN: Continues to monitor for withdrawal symptoms and treat.

## 2021-08-25 NOTE — PROGRESS NOTES
"BP 97/76 (BP Location: Left arm)   Pulse 71   Temp 97.2  F (36.2  C) (Axillary)   Resp 16   Ht 1.778 m (5' 10\")   Wt 73.5 kg (162 lb)   SpO2 94%   BMI 23.24 kg/m      BPs soft intermittently in high 90s systolic. AZ WNL. Other VSS. Pt denies shortness of breath or chest pain. IS encouraged and performed @ 1500. Pt requiring multiple attempts to arouse. When pt is awake, pt is slow to respond but is oriented x3, a couple days off on the date. Pt scored >8 on CIWA but did not receive Ativan d/t lethargy and being hard to arouse. Continue POC   "
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
## 2021-08-25 NOTE — PROGRESS NOTES
"/77 (BP Location: Right arm)   Pulse 72   Temp 98.7  F (37.1  C) (Oral)   Resp 14   Ht 1.778 m (5' 10\")   Wt 73.5 kg (162 lb)   SpO2 96%   BMI 23.24 kg/m       Neuro: Alert and oriented x4 when awake and forgetful of date at times, tremors, CIWA 7/6,  Cardiac: BP soft, denies chest pain, WNL  Respiratory: denies SOB, on 2L NC  GI/: +BS/flatus, external catheter in placed, incontinent x1, LBM 8/24/21  Diet/Appetite: regular, denies N/V  Skin: scattered bruises,   LDA: (L) PIV infusing NS @ 100 mL/hr, (R) PIV SL  Activity: Ax2 when up, (R) leg barely movable   Pain: (R) hip/ribs pain managed with PRN IV dilaudid, flexeril given  Plan: Monitor labs (K+), recheck schedule for this morning, monitor ANGEL,     "

## 2021-08-25 NOTE — CONSULTS
[facility]   Consult Note - Addiction Service     Date of Admission:  8/23/2021    Consult Requested by: Trauma  Reason for Consult: alcohol dependence    Assessment & Plan   Ming Uribe admitted on 2/1/2021. Ming Uribe is a 49 yo male with a PMHX that includes anxiety, depression, insomnia, severe alcohol use disorder, prior episodes of alcoholic hepatitis, followed in the past with hepatology at [facility], admitted to trauma service in alcohol withdrawal after multiple falls found to have rib fractures.    Severe Alcohol use disorder, complicated by active withdrawal and acute alcoholic hepatitis:  Recent period of sobriety after prior admission last year, ended after multiple social stressors, including divorce this past winter.  We discussed different options to maintain goal of sobriety, including medications to manage cravings, including acamprosate and naltrexone.  He would be interested, especially in naltrexone.  However, this is contraindicated until need for agonist opioids has resolved.  - will discuss gabapentin, acamprosate during the admission  - housing: stable  - discussed inpatient or outpatient chem dep treatment: current goal is complete avoidance of alcohol, but he is unsure if he needs additional support.  Will follow up tomorrow.    For alcohol withdrawal:  Continue CIWA; add on gabapentin, 600 mg TID.  Consider prescribing at discharge if this helps with underlying anxiety.    Liver disease:   Labs and symptoms suggest alcoholic hepatitis; platelets are low normal compared to last year (when they were elevated), concerning for developing fibrosis.    - primary team currently monitoring.  Since labs stable, can likely follow up with primary care as an outpatient; will focus on alcohol control above.      Peer Support: Our per  will meet him if still hospitalized on Thursday, to provide additional outpatient resources  To contact  Kat, Peer  from [facility]:  Please call or text: [phone]    Mental health:  Taking zoloft and taking clonazepam 0.5 mg daily at bedtime for sleep as an outpatient    Linkage to Care:   Has a PCP and a therapist.     The patient's care was discussed with the Bedside Nurse, Patient and Primary team.      I spent 120 minutes on the unit/floor managing the care of Ming Uribe. Over 50% of my time was spent on the following:   - Counseling the patient and/or family regarding: diagnostic results, prognosis, risks and benefits of treatment options and recommended follow-up  - Coordination of care with the: consultant(s) and care coordinator/    Stanislaw Phipps MD  [facility]   Contact information available via [facility] Paging/Directory  Please see sign in/sign out for up to date coverage information    ChAT team (Addiction Consult Team): Coverage Monday-Friday 8-4pm    Provider (Pager)  (Pager)   Mon Dr. Stanislaw Phipps [phone]   Tues Dr[address] Stanislaw Phipps [phone]   Wed Dr. Stanislaw Phipps [phone] None   Thurs Dr. Carlos Narayanan [phone]   Fri Dr. Mg Snowden [phone]   [facility] Psych Team- Refer to [facility].  For urgent needs, please place a  consult for psychiatry. None     ______________________________________________________________________    Chief Complaint   Alcohol withdrawal complicated by alcoholic hepatitis    History is obtained from the patient    History of Present Illness   Ming Uribe is a 51 yo male with a PMHX that includes severe alcohol use disorder, prior episodes of alcoholic hepatitis, followed in the past with hepatology at [facility], admitted to trauma service in alcohol withdrawal after multiple falls found to have rib fractures.    Alcohol history:  Long history of alcohol dependence.  Had an episode of alcoholic hepatitis approximately 1 year ago at [facility].  He was able to completely stop  drinking for months, but then started drinking again this winter, once he and his wife had a divorce.  Besides anxiety being a trigger, he often will drink in social situations, but then does not stop.  During his period of sobriety, he was able to avoid drinking despite being around friends who sometimes would drink.  However for the increase stress in his life recently, his found it much more difficult to avoid.  His goal is to completely avoid alcohol, because if he has 1, he does not stop.  He has not done chemical dependency treatment in the past.  He does have a therapist for his anxiety, and also has a primary care provider who is working with on anxiety, depression, and insomnia management.    He has never taken medications to assist in alcohol cravings.    His goal is to be active, exercise, and get back to work, and to avoid alcohol    Identified race/ethnicity: white  Employed: unemployed  Housing status: has apartment, denies financial concerns        Review of Systems   The 10 point Review of Systems is negative other than noted in the HPI or here.     Past Medical History:   Diagnosis Date     Acquired hypothyroidism      Alcoholic hepatitis without ascites      Moderate asthma     since age 13 years     Seasonal allergic rhinitis      Severe episode of recurrent major depressive disorder, without psychotic features (H)        No past surgical history on file.    Social History   Social History     Socioeconomic History     Marital status:      Spouse name: Not on file     Number of children: Not on file     Years of education: Not on file     Highest education level: Not on file   Occupational History     Not on file   Tobacco Use     Smoking status: Never Smoker     Smokeless tobacco: Former User     Tobacco comment: smoked in college   Substance and Sexual Activity     Alcohol use: Not on file     Drug use: Not on file     Comment: history alcohol use     Sexual activity: Not on file   Other  Topics Concern     Not on file   Social History Narrative    Getting divorce from wife    Has son.     Experiencing increased stress currently     Social Determinants of Health     Financial Resource Strain:      Difficulty of Paying Living Expenses:    Food Insecurity:      Worried About Running Out of Food in the Last Year:      Ran Out of Food in the Last Year:    Transportation Needs:      Lack of Transportation (Medical):      Lack of Transportation (Non-Medical):    Physical Activity:      Days of Exercise per Week:      Minutes of Exercise per Session:    Stress:      Feeling of Stress :    Social Connections:      Frequency of Communication with Friends and Family:      Frequency of Social Gatherings with Friends and Family:      Attends Caodaism Services:      Active Member of Clubs or Organizations:      Attends Club or Organization Meetings:      Marital Status:    Intimate Partner Violence:      Fear of Current or Ex-Partner:      Emotionally Abused:      Physically Abused:      Sexually Abused:        Family History   FAMILY HISTORY    Medications   I have reviewed this patient's current medications    Allergies   No Known Allergies    Physical Exam   Vital Signs: Temp: 96.4  F (35.8  C) Temp src: Oral BP: 99/41 Pulse: 75   Resp: 16 SpO2: 97 % O2 Device: None (Room air)    Weight: 125 lbs 0 oz    Gen: NAD  HEENT: EOMI, PERRL, MMM  CV: extremities warm and well perfused  Resp: breathing comfortably on RA  : deferred  Msk: no LE edema  Skin: no rashes  Neuro: nonfocal exam        Due to regulation of Title 42 of the Code of Federal Regulations (CFR) Part 2: Confidentiality laws apply to this note and the information wherein.  Thus, this note cannot be copy and pasted into any other health care staff's note nor can it be included in general medical records sent to ANY outside agency without the patient's written consent.

## 2021-08-25 NOTE — PLAN OF CARE
Assumed cares: [id number]  Status: admitted for multiple falls during alcohol withdrawal    VS: AVSS on RA while awake and 2L NC when sleeping  Neuros: pt very sleepy  but arousable by voice and gently shaking then AOX4 and answering q's, forgetful.  Asked several times for synthroid and each time writer explained he received it today at 11am in the ER.   CIWA=8, 1mg po ativan given. CIWA 1h later=7 (no ativan given per protocol). Pt has visible tremors when awake and minimal sweating. Denies headache, nausea and hallucinations.     Cardiac: RRR, denies dizziness   Respiratory: RRR, sating well on 2L NC but de-sats to upper 80s when sleeping. Oncoming RN aware to use face mask while sleeping. Lungs very diminished. Taught correct IS use, & CDB pt had good tolerance.    GI/: pt does not know date of last stool. Recommend stool softener.  Denies urinary issues, but was incontinent of urine in bed at 1900.    Nutrition: reg diet    IV/Drains: L PIV infusing 100ml/hr NS  Activity: 2 assist to the chair while staff in room (pt was not left unattended in chair due to tremors/sleepiness), bed alarm on and seizure pads on rails for safety.  Pain: c/o rib pain and R hip pain. Tylenol given X1. Lidocaine patch on R hip.     Skin: 2 RN Skin assessment still needs to be completed-oncoming RN aware. No signs of bleeding.   Labs: K+=3.2, po replacement ordered; P=2.2 (po replacement ordered) -oncoming RN aware. Mg2+ WNL, recheck scheduled for am. Na+ jfmwmra=928, improving.     Plan of Care: pt was playing with his IV and closing the roller clamp, reminded pt not adjust IV and to call RN instead. Home meds placed in security. Plan for anesthesia to place pain block in back tomorrow. Cont with poc.     Problem: Adult Inpatient Plan of Care  Goal: Plan of Care Review  Outcome: No Change  Flowsheets (Taken 8/24/2021 2003)  Plan of Care Reviewed With: patient  Goal: Patient-Specific Goal (Individualized)  Outcome: No Change  Goal:  Absence of Hospital-Acquired Illness or Injury  Outcome: No Change  Intervention: Identify and Manage Fall Risk  Recent Flowsheet Documentation  Taken 8/24/2021 1555 by Marilea iPsano, RN  Safety Promotion/Fall Prevention:    activity supervised    assistive device/personal items within reach    bed alarm on  Intervention: Prevent Skin Injury  Recent Flowsheet Documentation  Taken 8/24/2021 1555 by Mariela Pisano, RN  Body Position: turned  Intervention: Prevent and Manage VTE (Venous Thromboembolism) Risk  Recent Flowsheet Documentation  Taken 8/24/2021 1555 by Mariela Pisano, RN  VTE Prevention/Management:    ambulation promoted    bleeding risk assessed    fluids promoted  Goal: Optimal Comfort and Wellbeing  Outcome: No Change  Goal: Readiness for Transition of Care  Outcome: No Change     Problem: Breathing Pattern Ineffective  Goal: Effective Breathing Pattern  Outcome: No Change  Intervention: Promote Improved Breathing Pattern  Recent Flowsheet Documentation  Taken 8/24/2021 1555 by Mariela Pisano, RN  Head of Bed (HOB) Positioning: HOB at 30 degrees

## 2021-08-26 ENCOUNTER — APPOINTMENT (OUTPATIENT)
Dept: PHYSICAL THERAPY | Facility: CLINIC | Age: 51
DRG: 183 | End: 2021-08-26
Payer: COMMERCIAL

## 2021-08-26 ENCOUNTER — APPOINTMENT (OUTPATIENT)
Dept: GENERAL RADIOLOGY | Facility: CLINIC | Age: 51
DRG: 183 | End: 2021-08-26
Attending: NURSE PRACTITIONER
Payer: COMMERCIAL

## 2021-08-26 ENCOUNTER — APPOINTMENT (OUTPATIENT)
Dept: OCCUPATIONAL THERAPY | Facility: CLINIC | Age: 51
DRG: 183 | End: 2021-08-26
Payer: COMMERCIAL

## 2021-08-26 LAB
ANION GAP SERPL CALCULATED.3IONS-SCNC: 5 MMOL/L (ref 3–14)
BUN SERPL-MCNC: 13 MG/DL (ref 7–30)
CALCIUM SERPL-MCNC: 8.5 MG/DL (ref 8.5–10.1)
CHLORIDE BLD-SCNC: 100 MMOL/L (ref 94–109)
CO2 SERPL-SCNC: 28 MMOL/L (ref 20–32)
CREAT SERPL-MCNC: 0.67 MG/DL (ref 0.66–1.25)
ERYTHROCYTE [DISTWIDTH] IN BLOOD BY AUTOMATED COUNT: 13.2 % (ref 10–15)
GFR SERPL CREATININE-BSD FRML MDRD: >90 ML/MIN/1.73M2
GLUCOSE BLD-MCNC: 98 MG/DL (ref 70–99)
GLUCOSE BLDC GLUCOMTR-MCNC: 102 MG/DL (ref 70–99)
GLUCOSE BLDC GLUCOMTR-MCNC: 109 MG/DL (ref 70–99)
GLUCOSE BLDC GLUCOMTR-MCNC: 113 MG/DL (ref 70–99)
GLUCOSE BLDC GLUCOMTR-MCNC: 95 MG/DL (ref 70–99)
GLUCOSE BLDC GLUCOMTR-MCNC: 98 MG/DL (ref 70–99)
HCT VFR BLD AUTO: 29.7 % (ref 40–53)
HGB BLD-MCNC: 9.7 G/DL (ref 13.3–17.7)
MAGNESIUM SERPL-MCNC: 1.8 MG/DL (ref 1.6–2.3)
MCH RBC QN AUTO: 32.9 PG (ref 26.5–33)
MCHC RBC AUTO-ENTMCNC: 32.7 G/DL (ref 31.5–36.5)
MCV RBC AUTO: 101 FL (ref 78–100)
PHOSPHATE SERPL-MCNC: 1.7 MG/DL (ref 2.5–4.5)
PLATELET # BLD AUTO: 196 10E3/UL (ref 150–450)
POTASSIUM BLD-SCNC: 3.7 MMOL/L (ref 3.4–5.3)
RBC # BLD AUTO: 2.95 10E6/UL (ref 4.4–5.9)
SODIUM SERPL-SCNC: 133 MMOL/L (ref 133–144)
WBC # BLD AUTO: 6 10E3/UL (ref 4–11)

## 2021-08-26 PROCEDURE — 85027 COMPLETE CBC AUTOMATED: CPT | Performed by: NURSE PRACTITIONER

## 2021-08-26 PROCEDURE — 120N000002 HC R&B MED SURG/OB UMMC

## 2021-08-26 PROCEDURE — 250N000013 HC RX MED GY IP 250 OP 250 PS 637: Performed by: NURSE PRACTITIONER

## 2021-08-26 PROCEDURE — 97535 SELF CARE MNGMENT TRAINING: CPT | Mod: GO | Performed by: OCCUPATIONAL THERAPIST

## 2021-08-26 PROCEDURE — 36415 COLL VENOUS BLD VENIPUNCTURE: CPT | Performed by: STUDENT IN AN ORGANIZED HEALTH CARE EDUCATION/TRAINING PROGRAM

## 2021-08-26 PROCEDURE — 97116 GAIT TRAINING THERAPY: CPT | Mod: GP | Performed by: PHYSICAL THERAPIST

## 2021-08-26 PROCEDURE — 97530 THERAPEUTIC ACTIVITIES: CPT | Mod: GO | Performed by: OCCUPATIONAL THERAPIST

## 2021-08-26 PROCEDURE — 84100 ASSAY OF PHOSPHORUS: CPT | Performed by: STUDENT IN AN ORGANIZED HEALTH CARE EDUCATION/TRAINING PROGRAM

## 2021-08-26 PROCEDURE — 250N000013 HC RX MED GY IP 250 OP 250 PS 637: Performed by: INTERNAL MEDICINE

## 2021-08-26 PROCEDURE — 71045 X-RAY EXAM CHEST 1 VIEW: CPT | Mod: 26 | Performed by: RADIOLOGY

## 2021-08-26 PROCEDURE — 83735 ASSAY OF MAGNESIUM: CPT | Performed by: STUDENT IN AN ORGANIZED HEALTH CARE EDUCATION/TRAINING PROGRAM

## 2021-08-26 PROCEDURE — 99232 SBSQ HOSP IP/OBS MODERATE 35: CPT | Performed by: PHYSICIAN ASSISTANT

## 2021-08-26 PROCEDURE — 250N000013 HC RX MED GY IP 250 OP 250 PS 637: Performed by: STUDENT IN AN ORGANIZED HEALTH CARE EDUCATION/TRAINING PROGRAM

## 2021-08-26 PROCEDURE — 80048 BASIC METABOLIC PNL TOTAL CA: CPT | Performed by: STUDENT IN AN ORGANIZED HEALTH CARE EDUCATION/TRAINING PROGRAM

## 2021-08-26 PROCEDURE — 250N000011 HC RX IP 250 OP 636: Performed by: NURSE PRACTITIONER

## 2021-08-26 PROCEDURE — 71045 X-RAY EXAM CHEST 1 VIEW: CPT

## 2021-08-26 RX ORDER — SERTRALINE HYDROCHLORIDE 100 MG/1
100 TABLET, FILM COATED ORAL DAILY
Qty: 90 TABLET | Refills: 1 | Status: SHIPPED | OUTPATIENT
Start: 2021-08-26

## 2021-08-26 RX ADMIN — CLONIDINE HYDROCHLORIDE 0.1 MG: 0.1 TABLET ORAL at 01:56

## 2021-08-26 RX ADMIN — SERTRALINE HYDROCHLORIDE 100 MG: 100 TABLET ORAL at 09:05

## 2021-08-26 RX ADMIN — OXYCODONE HYDROCHLORIDE 5 MG: 5 TABLET ORAL at 12:10

## 2021-08-26 RX ADMIN — ENOXAPARIN SODIUM 40 MG: 40 INJECTION SUBCUTANEOUS at 16:14

## 2021-08-26 RX ADMIN — THIAMINE HCL TAB 100 MG 200 MG: 100 TAB at 09:05

## 2021-08-26 RX ADMIN — POTASSIUM & SODIUM PHOSPHATES POWDER PACK 280-160-250 MG 2 PACKET: 280-160-250 PACK at 16:14

## 2021-08-26 RX ADMIN — CLONIDINE HYDROCHLORIDE 0.1 MG: 0.1 TABLET ORAL at 18:45

## 2021-08-26 RX ADMIN — LEVOTHYROXINE SODIUM 150 MCG: 0.15 TABLET ORAL at 05:33

## 2021-08-26 RX ADMIN — GABAPENTIN 600 MG: 600 TABLET, FILM COATED ORAL at 20:16

## 2021-08-26 RX ADMIN — Medication 400 MG: at 20:16

## 2021-08-26 RX ADMIN — Medication 500 MG: at 18:45

## 2021-08-26 RX ADMIN — GABAPENTIN 600 MG: 600 TABLET, FILM COATED ORAL at 09:05

## 2021-08-26 RX ADMIN — Medication 400 MG: at 09:05

## 2021-08-26 RX ADMIN — POTASSIUM & SODIUM PHOSPHATES POWDER PACK 280-160-250 MG 2 PACKET: 280-160-250 PACK at 10:53

## 2021-08-26 RX ADMIN — LIDOCAINE 3 PATCH: 246 PATCH TOPICAL at 09:06

## 2021-08-26 RX ADMIN — ACETAMINOPHEN 650 MG: 325 TABLET, FILM COATED ORAL at 09:12

## 2021-08-26 RX ADMIN — FOLIC ACID 1 MG: 1 TABLET ORAL at 09:05

## 2021-08-26 RX ADMIN — ACETAMINOPHEN 650 MG: 325 TABLET, FILM COATED ORAL at 18:44

## 2021-08-26 RX ADMIN — CLONIDINE HYDROCHLORIDE 0.1 MG: 0.1 TABLET ORAL at 10:56

## 2021-08-26 RX ADMIN — GABAPENTIN 600 MG: 600 TABLET, FILM COATED ORAL at 16:15

## 2021-08-26 RX ADMIN — DOXYCYCLINE HYCLATE 100 MG: 100 CAPSULE ORAL at 09:12

## 2021-08-26 RX ADMIN — CEFTRIAXONE SODIUM 2 G: 2 INJECTION, POWDER, FOR SOLUTION INTRAMUSCULAR; INTRAVENOUS at 10:53

## 2021-08-26 RX ADMIN — OXYCODONE HYDROCHLORIDE 5 MG: 5 TABLET ORAL at 21:42

## 2021-08-26 RX ADMIN — DOXYCYCLINE HYCLATE 100 MG: 100 CAPSULE ORAL at 21:42

## 2021-08-26 RX ADMIN — Medication 500 MG: at 12:10

## 2021-08-26 RX ADMIN — MULTIPLE VITAMINS W/ MINERALS TAB 1 TABLET: TAB at 09:05

## 2021-08-26 RX ADMIN — OXYCODONE HYDROCHLORIDE 5 MG: 5 TABLET ORAL at 09:12

## 2021-08-26 RX ADMIN — DOCUSATE SODIUM 50 MG AND SENNOSIDES 8.6 MG 1 TABLET: 8.6; 5 TABLET, FILM COATED ORAL at 20:16

## 2021-08-26 RX ADMIN — OXYCODONE HYDROCHLORIDE 5 MG: 5 TABLET ORAL at 16:15

## 2021-08-26 RX ADMIN — MONTELUKAST 10 MG: 10 TABLET, FILM COATED ORAL at 21:42

## 2021-08-26 RX ADMIN — PANTOPRAZOLE SODIUM 40 MG: 40 TABLET, DELAYED RELEASE ORAL at 09:06

## 2021-08-26 RX ADMIN — OXYCODONE HYDROCHLORIDE 5 MG: 5 TABLET ORAL at 05:41

## 2021-08-26 RX ADMIN — FLUTICASONE FUROATE AND VILANTEROL TRIFENATATE 1 PUFF: 200; 25 POWDER RESPIRATORY (INHALATION) at 09:06

## 2021-08-26 RX ADMIN — ACETAMINOPHEN 650 MG: 325 TABLET, FILM COATED ORAL at 01:56

## 2021-08-26 RX ADMIN — POTASSIUM & SODIUM PHOSPHATES POWDER PACK 280-160-250 MG 2 PACKET: 280-160-250 PACK at 20:16

## 2021-08-26 ASSESSMENT — ACTIVITIES OF DAILY LIVING (ADL)
ADLS_ACUITY_SCORE: 19
ADLS_ACUITY_SCORE: 18
DEPENDENT_IADLS:: INDEPENDENT

## 2021-08-26 NOTE — CONSULTS
Care Management Initial Consult    General Information  Assessment completed with: Patient,    Type of CM/SW Visit: Initial Assessment    Primary Care Provider verified and updated as needed: Yes   Readmission within the last 30 days:        Reason for Consult: discharge planning  Advance Care Planning: Advance Care Planning Reviewed: no concerns identified          Communication Assessment  Patient's communication style: spoken language (English or Bilingual)    Hearing Difficulty or Deaf: no   Wear Glasses or Blind: yes    Cognitive  Cognitive/Neuro/Behavioral: .WDL except  Level of Consciousness: alert  Arousal Level: opens eyes spontaneously  Orientation: oriented x 4  Mood/Behavior: calm, cooperative  Best Language: 0 - No aphasia  Speech: clear, spontaneous    Living Environment:   People in home: alone     Current living Arrangements: apartment      Able to return to prior arrangements: yes       Family/Social Support:  Care provided by: self  Provides care for:    Marital Status: Single  Other (specify) (friends)          Description of Support System: Supportive, Involved    Support Assessment: Adequate family and caregiver support, Adequate social supports    Current Resources:   Patient receiving home care services: No     Community Resources: None  Equipment currently used at home: none  Supplies currently used at home: None    Employment/Financial:  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)        Financial Concerns: No concerns identified           Lifestyle & Psychosocial Needs:  Social Determinants of Health     Tobacco Use: Medium Risk     Smoking Tobacco Use: Never Smoker     Smokeless Tobacco Use: Former User   Alcohol Use:      Frequency of Alcohol Consumption:      Average Number of Drinks:      Frequency of Binge Drinking:    Financial Resource Strain:      Difficulty of Paying Living Expenses:    Food Insecurity:      Worried About Running Out of Food in the  Last Year:      Ran Out of Food in the Last Year:    Transportation Needs:      Lack of Transportation (Medical):      Lack of Transportation (Non-Medical):    Physical Activity:      Days of Exercise per Week:      Minutes of Exercise per Session:    Stress:      Feeling of Stress :    Social Connections:      Frequency of Communication with Friends and Family:      Frequency of Social Gatherings with Friends and Family:      Attends Yazidi Services:      Active Member of Clubs or Organizations:      Attends Club or Organization Meetings:      Marital Status:    Intimate Partner Violence:      Fear of Current or Ex-Partner:      Emotionally Abused:      Physically Abused:      Sexually Abused:    Depression: At risk     PHQ-2 Score: 4   Housing Stability:      Unable to Pay for Housing in the Last Year:      Number of Places Lived in the Last Year:      Unstable Housing in the Last Year:        Functional Status:  Prior to admission patient needed assistance:   Dependent ADLs:: Independent  Dependent IADLs:: Independent  Assesssment of Functional Status: Not at baseline with ADL Functioning, Not at baseline with mobility    Mental Health Status:  Mental Health Status: Current Concern  Mental Health Management: Medication, Other (see comment) (therapist )    Chemical Dependency Status:  Chemical Dependency Status: Current Concern, severe alcohol use disorder    Chemical Dependency Management: Addiction medicine consult      Values/Beliefs:  Spiritual, Cultural Beliefs, Yazidi Practices, Values that affect care: no               Additional Information:  Patient is a 50 year old male admitted with rib fractures and left hip hematoma after a fall, h/x severe alcohol use disorder, alcoholic hepatitis, ETOH withdrawal.  Per MD team patient is anticipated to be medically ready for discharge in the next 1-2 days.  PT/OT evaluated the patient and are recommending TCU.  RNCC met with patient at bedside to introduce  self and discuss discharge planning.  Discussed PT/OT recommendations.  Patient reports his plan is to discharge to home at time of discharge.  He lives in an apt in [place] alone.  Reports having many friends in his apt building and around the [facility] area that are supportive of him and that can help him post discharge.  Patient feels he is moving around well enough to discharge to home.  He has been using a walker when up mobilizing and would like a walker for home.  He is agreeable to home PT at discharge.  Offered choice of agencies, patient does not have a preference.  Referrals made to the following agencies:    [facility]-Do not accept pts insurance   [facility]-Do not accept pts insurance  [facility]-Full No capacity   [facility]-Do not accept pts insurance    Called pts insurance to get a list of agencies that are in network with his [employer] insurance.  He could not find any in network agencies in the patients area, but was going to look into this further and call this writer back.          RNCC will continue to follow and assist with discharge planning.      ADDENDUM:  Received a call back from [employer] and the representative explained that they searched and there are no in network home health agencies within a 100 mile radius of the patients zip code.  He did explain that the patient does have out of network benefits(55% coverage once deductible is met).  Asked if [facility] would be able to accept as OON and per the liaison they are not willing to take patients with  [employer] even as OON.       MEENU Person  Phone: [phone]  Pager: [phone]  To contact the weekend RNCC, Page: [phone]
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
## 2021-08-26 NOTE — PROGRESS NOTES
Admitted/transferred from:   2 RN full   skin assessment completed by Rodger Araiza RN and Lily THOMAS RN.  Skin assessment finding: issues found Bilateral hip bruising R>L. Scabbing noted on bilateral knees. Bilateral feet swelling   Interventions/actions: skin interventions Pt educated on importance of frequent repositioning. Fall precautions in place and pt educated on importance of calling when needing to move     Will continue to monitor.

## 2021-08-26 NOTE — TELEPHONE ENCOUNTER
sertraline (ZOLOFT) 100 MG tablet  Last Written Prescription Date:  8/24/2021  Last Fill Quantity: 100,   # refills: 0  Last Office Visit : 7/1/2021  Future Office visit:  None    Routing refill request to provider for review/approval because:  Last order on Aug 24th written by hospitalitis.  Order before that was only 60 days sent to pharm  Would Provider like a 90 day supply with refills sent to the pharmacy for Pt care.   Please advise       Molly Jimenez RN  Central Triage Red Flags/Med Refills

## 2021-08-26 NOTE — PLAN OF CARE
"    /80 (BP Location: Right arm)   Pulse 73   Temp 99.5  F (37.5  C) (Oral)   Resp 18   Ht 1.778 m (5' 10\")   Wt 73.5 kg (162 lb)   SpO2 95%   BMI 23.24 kg/m        1530 - 2330  Reason for admission: Pt fell at home.  Neuro: A&Ox4, forgetful. Bed alarm on.  Cardiac: SR. VSS.   Respiratory: Sating 95% 2 liters.  GI/:No bm.  Adequate urine output.   Diet/appetite: Tolerating regular diet. Eating well.  Activity:  Assist of one up to chair and in halls.  Pain: At acceptable level on current regimen.   Skin: No new deficits noted.  LDA's:PIV.    Plan: Continue with POC. Notify primary team with changes.  "
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
## 2021-08-26 NOTE — PLAN OF CARE
"Shift: 2300 - 0700  VS: /64 (BP Location: Right arm)   Pulse 76   Temp 98.7  F (37.1  C) (Oral)   Resp 18   Ht 1.778 m (5' 10\")   Wt 73.5 kg (162 lb)   SpO2 95%   BMI 23.24 kg/m      Neuro: A&Ox4, lethargic but arouses to voice, tremors BUE otherwise neuros intact, CIWA 3, CMS intact  Cardiac: pulses palpable  Resp: lung sounds coarse, no SOB reported, sating well on RA, IS encouraged  GI: passing gas, bowel sounds active, LBM 8/25  : voiding spontaneously with primofit NOC, adequate amount of dawna urine, encouraged use of BSC in AM   Skin: L & R thigh hematomas without expansion throughout shift   Pain/Nausea: pain with ambulation, oxy given; denies nausea  LDA: L & R PIV   Diet: regular, needs reminders to order food   Mobility: 1 assist FWW, not OOB this shift, ambulation encouraged, unsteady - bed alarm on for safety  Labs: reviewed, lytes replaced with rechecks in AM  Plan: continue plan of care    "

## 2021-08-27 ENCOUNTER — APPOINTMENT (OUTPATIENT)
Dept: PHYSICAL THERAPY | Facility: CLINIC | Age: 51
DRG: 183 | End: 2021-08-27
Payer: COMMERCIAL

## 2021-08-27 ENCOUNTER — APPOINTMENT (OUTPATIENT)
Dept: OCCUPATIONAL THERAPY | Facility: CLINIC | Age: 51
DRG: 183 | End: 2021-08-27
Payer: COMMERCIAL

## 2021-08-27 ENCOUNTER — APPOINTMENT (OUTPATIENT)
Dept: GENERAL RADIOLOGY | Facility: CLINIC | Age: 51
DRG: 183 | End: 2021-08-27
Attending: NURSE PRACTITIONER
Payer: COMMERCIAL

## 2021-08-27 LAB
ANION GAP SERPL CALCULATED.3IONS-SCNC: 4 MMOL/L (ref 3–14)
BUN SERPL-MCNC: 10 MG/DL (ref 7–30)
CALCIUM SERPL-MCNC: 8.8 MG/DL (ref 8.5–10.1)
CHLORIDE BLD-SCNC: 98 MMOL/L (ref 94–109)
CO2 SERPL-SCNC: 29 MMOL/L (ref 20–32)
CREAT SERPL-MCNC: 0.69 MG/DL (ref 0.66–1.25)
GFR SERPL CREATININE-BSD FRML MDRD: >90 ML/MIN/1.73M2
GLUCOSE BLD-MCNC: 108 MG/DL (ref 70–99)
GLUCOSE BLDC GLUCOMTR-MCNC: 114 MG/DL (ref 70–99)
GLUCOSE BLDC GLUCOMTR-MCNC: 80 MG/DL (ref 70–99)
GLUCOSE BLDC GLUCOMTR-MCNC: 90 MG/DL (ref 70–99)
HOLD SPECIMEN: NORMAL
MAGNESIUM SERPL-MCNC: 1.6 MG/DL (ref 1.6–2.3)
PHOSPHATE SERPL-MCNC: 2.1 MG/DL (ref 2.5–4.5)
POTASSIUM BLD-SCNC: 3.8 MMOL/L (ref 3.4–5.3)
SODIUM SERPL-SCNC: 131 MMOL/L (ref 133–144)

## 2021-08-27 PROCEDURE — 250N000011 HC RX IP 250 OP 636: Performed by: STUDENT IN AN ORGANIZED HEALTH CARE EDUCATION/TRAINING PROGRAM

## 2021-08-27 PROCEDURE — 71045 X-RAY EXAM CHEST 1 VIEW: CPT

## 2021-08-27 PROCEDURE — 80048 BASIC METABOLIC PNL TOTAL CA: CPT | Performed by: STUDENT IN AN ORGANIZED HEALTH CARE EDUCATION/TRAINING PROGRAM

## 2021-08-27 PROCEDURE — 71045 X-RAY EXAM CHEST 1 VIEW: CPT | Mod: 26 | Performed by: RADIOLOGY

## 2021-08-27 PROCEDURE — 120N000002 HC R&B MED SURG/OB UMMC

## 2021-08-27 PROCEDURE — 258N000003 HC RX IP 258 OP 636: Performed by: PHYSICIAN ASSISTANT

## 2021-08-27 PROCEDURE — 999N000128 HC STATISTIC PERIPHERAL IV START W/O US GUIDANCE

## 2021-08-27 PROCEDURE — 36415 COLL VENOUS BLD VENIPUNCTURE: CPT | Performed by: STUDENT IN AN ORGANIZED HEALTH CARE EDUCATION/TRAINING PROGRAM

## 2021-08-27 PROCEDURE — 97116 GAIT TRAINING THERAPY: CPT | Mod: GP | Performed by: PHYSICAL THERAPIST

## 2021-08-27 PROCEDURE — 83735 ASSAY OF MAGNESIUM: CPT | Performed by: STUDENT IN AN ORGANIZED HEALTH CARE EDUCATION/TRAINING PROGRAM

## 2021-08-27 PROCEDURE — 84100 ASSAY OF PHOSPHORUS: CPT | Performed by: STUDENT IN AN ORGANIZED HEALTH CARE EDUCATION/TRAINING PROGRAM

## 2021-08-27 PROCEDURE — 250N000011 HC RX IP 250 OP 636: Performed by: NURSE PRACTITIONER

## 2021-08-27 PROCEDURE — 97530 THERAPEUTIC ACTIVITIES: CPT | Mod: GO

## 2021-08-27 PROCEDURE — 250N000013 HC RX MED GY IP 250 OP 250 PS 637: Performed by: STUDENT IN AN ORGANIZED HEALTH CARE EDUCATION/TRAINING PROGRAM

## 2021-08-27 PROCEDURE — 99232 SBSQ HOSP IP/OBS MODERATE 35: CPT | Performed by: PHYSICIAN ASSISTANT

## 2021-08-27 PROCEDURE — 97530 THERAPEUTIC ACTIVITIES: CPT | Mod: GP | Performed by: PHYSICAL THERAPIST

## 2021-08-27 PROCEDURE — 250N000013 HC RX MED GY IP 250 OP 250 PS 637: Performed by: NURSE PRACTITIONER

## 2021-08-27 PROCEDURE — 250N000013 HC RX MED GY IP 250 OP 250 PS 637: Performed by: INTERNAL MEDICINE

## 2021-08-27 RX ORDER — MAGNESIUM OXIDE 400 MG/1
400 TABLET ORAL 2 TIMES DAILY
Status: DISCONTINUED | OUTPATIENT
Start: 2021-08-27 | End: 2021-08-28 | Stop reason: HOSPADM

## 2021-08-27 RX ADMIN — ACETAMINOPHEN 650 MG: 325 TABLET, FILM COATED ORAL at 02:14

## 2021-08-27 RX ADMIN — CYCLOBENZAPRINE HYDROCHLORIDE 5 MG: 5 TABLET, FILM COATED ORAL at 16:41

## 2021-08-27 RX ADMIN — PANTOPRAZOLE SODIUM 40 MG: 40 TABLET, DELAYED RELEASE ORAL at 07:54

## 2021-08-27 RX ADMIN — OXYCODONE HYDROCHLORIDE 5 MG: 5 TABLET ORAL at 16:53

## 2021-08-27 RX ADMIN — ACETAMINOPHEN 650 MG: 325 TABLET, FILM COATED ORAL at 18:03

## 2021-08-27 RX ADMIN — Medication 400 MG: at 07:54

## 2021-08-27 RX ADMIN — ACETAMINOPHEN 650 MG: 325 TABLET, FILM COATED ORAL at 09:59

## 2021-08-27 RX ADMIN — GABAPENTIN 600 MG: 600 TABLET, FILM COATED ORAL at 20:50

## 2021-08-27 RX ADMIN — FOLIC ACID 1 MG: 1 TABLET ORAL at 07:54

## 2021-08-27 RX ADMIN — Medication 500 MG: at 12:18

## 2021-08-27 RX ADMIN — MONTELUKAST 10 MG: 10 TABLET, FILM COATED ORAL at 20:50

## 2021-08-27 RX ADMIN — GABAPENTIN 600 MG: 600 TABLET, FILM COATED ORAL at 07:54

## 2021-08-27 RX ADMIN — CEFTRIAXONE SODIUM 2 G: 2 INJECTION, POWDER, FOR SOLUTION INTRAMUSCULAR; INTRAVENOUS at 10:51

## 2021-08-27 RX ADMIN — Medication 400 MG: at 20:50

## 2021-08-27 RX ADMIN — DOXYCYCLINE HYCLATE 100 MG: 100 CAPSULE ORAL at 20:50

## 2021-08-27 RX ADMIN — LEVOTHYROXINE SODIUM 150 MCG: 0.15 TABLET ORAL at 06:48

## 2021-08-27 RX ADMIN — MULTIPLE VITAMINS W/ MINERALS TAB 1 TABLET: TAB at 07:54

## 2021-08-27 RX ADMIN — CLONIDINE HYDROCHLORIDE 0.1 MG: 0.1 TABLET ORAL at 02:14

## 2021-08-27 RX ADMIN — OXYCODONE HYDROCHLORIDE 5 MG: 5 TABLET ORAL at 06:48

## 2021-08-27 RX ADMIN — ENOXAPARIN SODIUM 40 MG: 40 INJECTION SUBCUTANEOUS at 15:20

## 2021-08-27 RX ADMIN — CLONIDINE HYDROCHLORIDE 0.1 MG: 0.1 TABLET ORAL at 18:03

## 2021-08-27 RX ADMIN — LIDOCAINE 2 PATCH: 246 PATCH TOPICAL at 07:54

## 2021-08-27 RX ADMIN — SODIUM CHLORIDE 1000 ML: 9 INJECTION, SOLUTION INTRAVENOUS at 10:00

## 2021-08-27 RX ADMIN — FLUTICASONE FUROATE AND VILANTEROL TRIFENATATE 1 PUFF: 200; 25 POWDER RESPIRATORY (INHALATION) at 07:54

## 2021-08-27 RX ADMIN — SERTRALINE HYDROCHLORIDE 100 MG: 100 TABLET ORAL at 07:54

## 2021-08-27 RX ADMIN — THIAMINE HCL TAB 100 MG 200 MG: 100 TAB at 07:54

## 2021-08-27 RX ADMIN — Medication 500 MG: at 18:02

## 2021-08-27 RX ADMIN — GABAPENTIN 600 MG: 600 TABLET, FILM COATED ORAL at 14:28

## 2021-08-27 RX ADMIN — DOCUSATE SODIUM 50 MG AND SENNOSIDES 8.6 MG 2 TABLET: 8.6; 5 TABLET, FILM COATED ORAL at 07:54

## 2021-08-27 RX ADMIN — DOCUSATE SODIUM 50 MG AND SENNOSIDES 8.6 MG 1 TABLET: 8.6; 5 TABLET, FILM COATED ORAL at 20:50

## 2021-08-27 RX ADMIN — CLONIDINE HYDROCHLORIDE 0.1 MG: 0.1 TABLET ORAL at 09:59

## 2021-08-27 RX ADMIN — DOXYCYCLINE HYCLATE 100 MG: 100 CAPSULE ORAL at 09:59

## 2021-08-27 RX ADMIN — POTASSIUM & SODIUM PHOSPHATES POWDER PACK 280-160-250 MG 1 PACKET: 280-160-250 PACK at 20:50

## 2021-08-27 RX ADMIN — OXYCODONE HYDROCHLORIDE 5 MG: 5 TABLET ORAL at 21:06

## 2021-08-27 ASSESSMENT — ACTIVITIES OF DAILY LIVING (ADL)
ADLS_ACUITY_SCORE: 17

## 2021-08-27 NOTE — PROGRESS NOTES
Care Management Discharge Note    Discharge Date: 08/28/2021       Discharge Disposition:  Home     Discharge Services: None     Discharge DME:  Walker    Discharge Transportation: public transportation (uber)    Private pay costs discussed: Not applicable    PAS Confirmation Code: NA   Patient/family educated on Medicare website which has current facility and service quality ratings:  NA    Education Provided on the Discharge Plan: Yes   Persons Notified of Discharge Plans: Patient  Patient/Family in Agreement with the Plan: Yes      Handoff Referral Completed: No    Additional Information:  Per MD team plan for discharge to home in 1-2 days.  Patient requesting to see this writer.  He updated this writer that his brother has arranged for him to go to treatment, [facility], on Sunday.  His brother and niece are coming into town from [place] on Sunday to bring him there.  He feels that he is moving around well enough that he does not need any further therapy once discharged.  PT will issue him a walker.  Patient had no further questions regarding discharge.  RNCC will remain available if further needs arise.          Karla Wilson RNCC  Phone: [phone]  Pager: [phone]  To contact the weekend RNCC, Page: [phone]
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
## 2021-08-27 NOTE — PLAN OF CARE
No acute concerns overnight, patient is alert and oriented*4, left sided chest pain managed with scheduled tylenol and prn oxy. IS encouraged.   Ambulates in room and hallway, voiding good output. CIWA 3-4   Bed alarm for safety. Close monitoring      Problem: Adult Inpatient Plan of Care  Goal: Patient-Specific Goal (Individualized)  Outcome: Improving     Problem: Adult Inpatient Plan of Care  Goal: Absence of Hospital-Acquired Illness or Injury  Intervention: Identify and Manage Fall Risk  Recent Flowsheet Documentation  Taken 8/26/2021 2100 by Misael Reina RN  Safety Promotion/Fall Prevention:   activity supervised   assistive device/personal items within reach  Intervention: Prevent Skin Injury  Recent Flowsheet Documentation  Taken 8/26/2021 2100 by Misael Reina RN  Body Position: position changed independently  Intervention: Prevent and Manage VTE (Venous Thromboembolism) Risk  Recent Flowsheet Documentation  Taken 8/26/2021 2100 by Misael Reina RN  VTE Prevention/Management: ambulation promoted

## 2021-08-27 NOTE — PLAN OF CARE
"Blood pressure (!) 144/96, pulse 68, temperature 99.6  F (37.6  C), temperature source Oral, resp. rate 17, height 1.778 m (5' 10\"), weight 73.5 kg (162 lb), SpO2 93 %.    Pt is alert and oriented by 4. No complaints of pain until later in the evening. Pt was given PRN oxy PO with results. Both PIVs infiltrated during the day. New L PIV placed, patent and saline locked. PRN flexeril given for pain as well. Remains IND in the room with all transfers. Continent of bowel and bladder. Able to make needs known. Appetite good, ate 100% of breakfast, lunch and dinner. Bolus given. Continue with plan of care.  "

## 2021-08-28 ENCOUNTER — APPOINTMENT (OUTPATIENT)
Dept: PHYSICAL THERAPY | Facility: CLINIC | Age: 51
DRG: 183 | End: 2021-08-28
Payer: COMMERCIAL

## 2021-08-28 ENCOUNTER — APPOINTMENT (OUTPATIENT)
Dept: GENERAL RADIOLOGY | Facility: CLINIC | Age: 51
DRG: 183 | End: 2021-08-28
Attending: NURSE PRACTITIONER
Payer: COMMERCIAL

## 2021-08-28 VITALS
WEIGHT: 162 LBS | TEMPERATURE: 98.2 F | HEIGHT: 70 IN | RESPIRATION RATE: 16 BRPM | OXYGEN SATURATION: 93 % | HEART RATE: 65 BPM | SYSTOLIC BLOOD PRESSURE: 159 MMHG | BODY MASS INDEX: 23.19 KG/M2 | DIASTOLIC BLOOD PRESSURE: 105 MMHG

## 2021-08-28 LAB
ANION GAP SERPL CALCULATED.3IONS-SCNC: 6 MMOL/L (ref 3–14)
BUN SERPL-MCNC: 10 MG/DL (ref 7–30)
CALCIUM SERPL-MCNC: 9 MG/DL (ref 8.5–10.1)
CHLORIDE BLD-SCNC: 100 MMOL/L (ref 94–109)
CO2 SERPL-SCNC: 27 MMOL/L (ref 20–32)
CREAT SERPL-MCNC: 0.74 MG/DL (ref 0.66–1.25)
GFR SERPL CREATININE-BSD FRML MDRD: >90 ML/MIN/1.73M2
GLUCOSE BLD-MCNC: 116 MG/DL (ref 70–99)
GLUCOSE BLDC GLUCOMTR-MCNC: 78 MG/DL (ref 70–99)
HOLD SPECIMEN: NORMAL
MAGNESIUM SERPL-MCNC: 1.6 MG/DL (ref 1.6–2.3)
PHOSPHATE SERPL-MCNC: 2.7 MG/DL (ref 2.5–4.5)
POTASSIUM BLD-SCNC: 3.6 MMOL/L (ref 3.4–5.3)
SODIUM SERPL-SCNC: 133 MMOL/L (ref 133–144)

## 2021-08-28 PROCEDURE — 250N000013 HC RX MED GY IP 250 OP 250 PS 637: Performed by: STUDENT IN AN ORGANIZED HEALTH CARE EDUCATION/TRAINING PROGRAM

## 2021-08-28 PROCEDURE — 71045 X-RAY EXAM CHEST 1 VIEW: CPT

## 2021-08-28 PROCEDURE — 82565 ASSAY OF CREATININE: CPT | Performed by: STUDENT IN AN ORGANIZED HEALTH CARE EDUCATION/TRAINING PROGRAM

## 2021-08-28 PROCEDURE — 99239 HOSP IP/OBS DSCHRG MGMT >30: CPT | Performed by: PHYSICIAN ASSISTANT

## 2021-08-28 PROCEDURE — 97116 GAIT TRAINING THERAPY: CPT | Mod: GP

## 2021-08-28 PROCEDURE — 250N000013 HC RX MED GY IP 250 OP 250 PS 637: Performed by: NURSE PRACTITIONER

## 2021-08-28 PROCEDURE — 84100 ASSAY OF PHOSPHORUS: CPT | Performed by: STUDENT IN AN ORGANIZED HEALTH CARE EDUCATION/TRAINING PROGRAM

## 2021-08-28 PROCEDURE — 250N000013 HC RX MED GY IP 250 OP 250 PS 637: Performed by: INTERNAL MEDICINE

## 2021-08-28 PROCEDURE — 71045 X-RAY EXAM CHEST 1 VIEW: CPT | Mod: 26

## 2021-08-28 PROCEDURE — 36415 COLL VENOUS BLD VENIPUNCTURE: CPT | Performed by: STUDENT IN AN ORGANIZED HEALTH CARE EDUCATION/TRAINING PROGRAM

## 2021-08-28 PROCEDURE — 83735 ASSAY OF MAGNESIUM: CPT | Performed by: STUDENT IN AN ORGANIZED HEALTH CARE EDUCATION/TRAINING PROGRAM

## 2021-08-28 RX ORDER — POTASSIUM CHLORIDE 750 MG/1
20 TABLET, EXTENDED RELEASE ORAL ONCE
Status: DISCONTINUED | OUTPATIENT
Start: 2021-08-28 | End: 2021-08-28 | Stop reason: HOSPADM

## 2021-08-28 RX ORDER — CYCLOBENZAPRINE HCL 5 MG
5 TABLET ORAL 3 TIMES DAILY PRN
Qty: 20 TABLET | Refills: 0 | Status: SHIPPED | OUTPATIENT
Start: 2021-08-28

## 2021-08-28 RX ORDER — MAGNESIUM OXIDE 400 MG/1
400 TABLET ORAL 2 TIMES DAILY
Status: DISCONTINUED | OUTPATIENT
Start: 2021-08-28 | End: 2021-08-28

## 2021-08-28 RX ORDER — DOXYCYCLINE 100 MG/1
100 CAPSULE ORAL EVERY 12 HOURS
Qty: 3 CAPSULE | Refills: 0 | Status: SHIPPED | OUTPATIENT
Start: 2021-08-28 | End: 2021-10-13

## 2021-08-28 RX ADMIN — POTASSIUM & SODIUM PHOSPHATES POWDER PACK 280-160-250 MG 1 PACKET: 280-160-250 PACK at 07:52

## 2021-08-28 RX ADMIN — SERTRALINE HYDROCHLORIDE 100 MG: 100 TABLET ORAL at 07:52

## 2021-08-28 RX ADMIN — LEVOTHYROXINE SODIUM 150 MCG: 0.15 TABLET ORAL at 06:09

## 2021-08-28 RX ADMIN — CLONIDINE HYDROCHLORIDE 0.1 MG: 0.1 TABLET ORAL at 10:02

## 2021-08-28 RX ADMIN — THIAMINE HCL TAB 100 MG 200 MG: 100 TAB at 07:52

## 2021-08-28 RX ADMIN — LIDOCAINE 3 PATCH: 246 PATCH TOPICAL at 07:53

## 2021-08-28 RX ADMIN — ACETAMINOPHEN 650 MG: 325 TABLET, FILM COATED ORAL at 10:02

## 2021-08-28 RX ADMIN — CLONIDINE HYDROCHLORIDE 0.1 MG: 0.1 TABLET ORAL at 02:31

## 2021-08-28 RX ADMIN — FOLIC ACID 1 MG: 1 TABLET ORAL at 07:52

## 2021-08-28 RX ADMIN — PANTOPRAZOLE SODIUM 40 MG: 40 TABLET, DELAYED RELEASE ORAL at 07:52

## 2021-08-28 RX ADMIN — GABAPENTIN 600 MG: 600 TABLET, FILM COATED ORAL at 07:52

## 2021-08-28 RX ADMIN — ACETAMINOPHEN 650 MG: 325 TABLET, FILM COATED ORAL at 02:31

## 2021-08-28 RX ADMIN — DOXYCYCLINE HYCLATE 100 MG: 100 CAPSULE ORAL at 07:52

## 2021-08-28 RX ADMIN — DOCUSATE SODIUM 50 MG AND SENNOSIDES 8.6 MG 2 TABLET: 8.6; 5 TABLET, FILM COATED ORAL at 07:52

## 2021-08-28 RX ADMIN — FLUTICASONE FUROATE AND VILANTEROL TRIFENATATE 1 PUFF: 200; 25 POWDER RESPIRATORY (INHALATION) at 07:53

## 2021-08-28 RX ADMIN — MULTIPLE VITAMINS W/ MINERALS TAB 1 TABLET: TAB at 07:52

## 2021-08-28 RX ADMIN — Medication 400 MG: at 07:52

## 2021-08-28 ASSESSMENT — ACTIVITIES OF DAILY LIVING (ADL)
ADLS_ACUITY_SCORE: 17

## 2021-08-28 NOTE — PLAN OF CARE
Occupational Therapy Discharge Summary    Reason for therapy discharge:    Discharged to home.    Progress towards therapy goal(s). See goals on Care Plan in [facility] electronic health record for goal details.  Goals partially met.  Barriers to achieving goals:   discharge from facility.    Therapy recommendation(s):    Continued therapy is recommended.  Rationale/Recommendations:  OT recommending TCU, however pt opting to discharge home with assist, then attend IP chemical dependency program. Pt would benefit from  OT to evaluate home safety, however per chart review pt declining additional therapy after discharge.

## 2021-08-28 NOTE — PLAN OF CARE
D AVSS with sat's 93% on room air. Heart regular and lungs decreased in bases otherwise clear. Voiced c/o rib pain x 1 which was relieved with scheduled Tylenol and oxycodone x 1 and has voiced no c/o nausea. Slept fair between cares. Up to bathroom independently to void an adequate amount. Hoping to be discharged today to inpatient treatment program.   I Vital's, assessment and med's per order.   A Resting in bed with call light in reach.   P Continue to monitor and update MD with changes.

## 2021-08-28 NOTE — PROGRESS NOTES
"Blood pressure (!) 159/105, pulse 65, temperature 98.2  F (36.8  C), temperature source Oral, resp. rate 16, height 1.778 m (5' 10\"), weight 73.5 kg (162 lb), SpO2 93 %.    Pt is alert and oriented by 4. Minor complaints of pain. Pain managed by scheduled tylenol. Pt IND with all transfers and a walker. New walker delivered to pt. PIV removed. Medications at security returned to pt. All belongings in room going home with pt.     Writer educated pt on the importance of Substance abuse rehabilitation. Pt stated he will go eventually when he \"feels\" better physically. Adequate for discharge.   "

## 2021-08-28 NOTE — PLAN OF CARE
Physical Therapy Discharge Summary    Reason for therapy discharge:    Discharged to home.    Progress towards therapy goal(s). See goals on Care Plan in [facility] electronic health record for goal details.  Goals met    Therapy recommendation(s):    Continued therapy is recommended.  Rationale/Recommendations:  Patient continues to display gait instability without AD and antalgic gait that would benefit from OP PT upon discharge for strengthening and gait training.

## 2021-08-28 NOTE — DISCHARGE INSTRUCTIONS
Rib Fracture    You broke one or more ribs. This is called a rib fracture. Rib fractures don't need a cast like other bones. They will heal by themselves in about 4 to 6 weeks. The first 3 to 4 weeks will be the most painful. During this time deep breathing, coughing, or changing position from sitting to lying down, may cause the broken ends to move slightly.   Home care    Rest. You should not be doing any heavy lifting or strenuous exertion until the pain goes away.    It hurts to breathe when you have a broken rib. This puts you at risk of getting pneumonia from poor airflow through your lungs. To prevent this:  ? Take several very deep breaths once an hour while you're awake. Breathe out through pursed lips as if you are blowing up a balloon. If possible, actually blow up a balloon or a rubber glove. This exercise builds up pressure inside the lung and prevents collapse of the small air sacs of the lung. This exercise may cause some pain at the site of injury. This is normal.  ? You may have gotten a breathing exercise device called an incentive spirometer. Use it at least 4 times a day, or as directed.    Apply an ice pack over the injured area for 15 to 20 minutes every 1 to 2 hours. You should do this for the first 24 to 48 hours. To make an ice pack, put ice cubes in a plastic bag that seals at the top. Wrap the bag in a clean, thin towel or cloth. Never put ice or an ice pack directly on the skin. Keep using ice packs as needed for the relief of pain and swelling.    You may use over-the-counter pain medicine to control pain, unless another pain medicine was prescribed. If you have chronic liver or kidney disease or ever had a stomach ulcer or GI (gastrointestinal) bleeding, talk with your healthcare provider before using these medicines.    If your pain is not controlled, contact your healthcare provider. Sometimes a stronger pain medicine may be needed. A nerve block can be done in case of severe pain.  It will numb the nerve between the ribs.    Follow-up care  Follow up with your healthcare provider, or as advised. In rare cases, a broken rib will cause complications in the first few days that may not be clearly seen during your initial exam. This can include collapsed lung, bleeding around the lung or into the belly (abdomen), or pneumonia. So watch for the signs below.   If X-rays were taken, you will be told of any new findings that may affect your care.  Call 911  Call 911 if you have:     Dizziness, weakness or fainting    Shortness of breath with or without chest discomfort    New or worsening abdominal pain    Discomfort in other areas of your upper body such as your shoulders, jaw, neck, or arms  When to seek medical advice  Call your healthcare provider right away if any of these occur:    Increasing chest pain with breathing    Fever of 100.4 F (38 C) or above, or as directed by your healthcare provider    Congested cough, nausea, or vomiting  Cathleen last reviewed this educational content on 4/1/2018 2000-2021 [employer]. All rights reserved. This information is not intended as a substitute for professional medical care. Always follow your healthcare professional's instructions.

## 2021-08-28 NOTE — DISCHARGE SUMMARY
[facility] [place]    Discharge Summary  Trauma Surgery Service    Date of Admission:  8/23/2021  Date of Discharge:  8/28/2021  Attending Physician: Dr. Damion Coe   Discharging Provider: Jenny DE LEON   Date of Service (when I saw the patient): 08/28/21    Primary Provider: Sofi Webster  Primary Care clinic: [address] [place] [id number]  Phone: [phone]  Fax number: [phone]     Discharge Diagnoses   Active Problems:    Alcohol withdrawal syndrome with complication (H)    Closed fracture of multiple ribs of right side, initial encounter    Hematoma of lower extremity, unspecified laterality, initial encounter      Hospital Course       Rib Fractures:  In rib fracture management, pulmonary toilet and good pain control allowing for good pulmonary toilet is paramount. Prior to discharge, his pain was controlled for Ming Uribe with scheduled acetaminophen, flexiril, NSAIDS, topical pain medications, PRN oral analgesics    Community acquired pneumonia  Right middle/lower lobe infiltrates post traumatic rib fractures, hypoxia, with associated leukocytosis, will treat for community acquired pneumonia, initially received a dose of Levaquin transitioned to - Doxycycline + Ceftriaxone due to prolonged QTc. Patient was discharge with Doxycyline to complete a 5 day course for CAP.     Large left greater than right thigh hematoma's  Supportive cares, warm packs for comfort  Lidoderm patches available for pain    ETOH  # Alcoholic hepatitis without ascites  # Transaminitis  - Long history of alcohol misuse, CT abdomen hepatic steatosis, no ascites, denies abdominal pain, total bilirubin 2.1, INR 1.08.  - The patient was screened for hazardous consumption of alcohol and dependence. Ming Uribe was placed on the CIWA protocol and monitored closely for withdrawal. Addiction Medicine was consulted. The correlation of ETOH use and  accidents/injuries were discussed with the patient.  The importance of abstaining from ETOH use while healing from existing injuries after discharge was reviewed with the patient. Patient was in touch with the [facility] during admission and his brother will provide a ride.     Electrolytes  # Hyponatremia, resolved   # Hypokalemia, resolved   # Hypophosphatemia, resolved   On admission the patient had electrolyte abnormalities that are common in patients with alcohol dependence. His sodium was replaced with normal saline IV, and potassium and phosphorus were replaced pre protocol.    Acute on Chronic anemia  - Hemoglobin 12.1, INR 1.08 drifted to 9.6 today. Mostly dilutional, apprx 2L IVF x24 hours, no over signs of bleeding noted. Threshold for transfusion if hgb less than 7.0 or signs/symptoms of hypoperfusion. Patient did not require transfusions of blood products during admission.     Therapy Recommendations:   Current status of physical therapies on discharge: home with assist;home with outpatient physical therapy. PT: Pt displayed safe ambulation with FWW which will be provided at d/c. CGA/SBA for gait with FWW    Current status of occupational therapies on discharge: Transitional Care Facility. Pt below baseline, would benefit from continued therapy to increase safety and independence with ADLs. If pt discharges home, would recommend home w/ A (ginny with higher-level IADLs) d/t cognitive deficits. SLUMs improved to 22/30, ModA ambulation in room with 2WW, posterior lean, ModA toilet transfer      Code Status   Full Code    SUBJECTIVE: Ming Uribe is a 50 year old male who presents with history of alcoholic hepatitis presented on 8/24/21 with abdominal pain, back pain, nausea and vomiting. He usually drinks 1L a day, but not in last day. He has had withdrawal in the past. He admitted to falling several times in the past few days. He is a poor historian and is actively withdrawing. He was given  "phenobarbital and is on CIWA protocol. He was scanned and found to have 3 rib fractures on the right.  During admission patient was below baseline d/t pain of his rib fractures and acute on chronic pain in his knees. He progressed and was dressed and ready to go before I saw him today.    Physical Exam   Temp: 98.2  F (36.8  C) Temp src: Oral BP: (!) 159/105 Pulse: 65   Resp: 16 SpO2: 93 % O2 Device: None (Room air)    Vitals:    08/23/21 1320   Weight: 73.5 kg (162 lb)     Vital Signs with Ranges  Temp:  [97  F (36.1  C)-99.6  F (37.6  C)] 98.2  F (36.8  C)  Pulse:  [65-78] 65  Resp:  [16-18] 16  BP: (116-159)/() 159/105  SpO2:  [90 %-93 %] 93 %  No intake/output data recorded.      Constitutional: Awake, alert, cooperative, no apparent distress.  Eyes: Lids and lashes normal, PERRL, EOMI,, sclera clear, conjunctiva normal.  HENT: Normocephalic, atraumatic  Respiratory: No increased work of breathing, good air exchange, clear to auscultation bilaterally, no crackles or wheezing. No breath sounds in RLQ d/t hemidiaphragm   Cardiovascular:  regular rate and rhythm  GI: Abdomen soft, non-distended, non-tender,  Genitourinary:  Nontraumatic   Skin:  Warm & dry  Musculoskeletal: There is no redness, warmth, or swelling of the joints.   Neurologic: Awake, alert, oriented.Strength and sensory is intact. No focal deficits.  Neuropsychiatric: Calm, normal eye contact, alert, affect appropriate to situation, oriented, thought process normal.    Discharge Disposition   Discharged to home  Condition at discharge: Stable  Discharge VS: Blood pressure (!) 159/105, pulse 65, temperature 98.2  F (36.8  C), temperature source Oral, resp. rate 16, height 1.778 m (5' 10\"), weight 73.5 kg (162 lb), SpO2 93 %.    Consultations This Hospital Stay   TRAUMA SURGERY IP CONSULT  REGIONAL ANESTHESIA PAIN SERVICE ADULT IP CONSULT  OCCUPATIONAL THERAPY ADULT IP CONSULT  PHYSICAL THERAPY ADULT IP CONSULT  OCCUPATIONAL THERAPY ADULT IP " CONSULT  PHYSICAL THERAPY ADULT IP CONSULT  ADDICTION SERVICE ADULT IP CONSULT FOR [place]  VASCULAR ACCESS CARE ADULT IP CONSULT  CARE MANAGEMENT / SOCIAL WORK IP CONSULT  VASCULAR ACCESS CARE ADULT IP CONSULT    Discharge Orders      Walker Order    DME Documentation:   Describe the reason for need to support medical necessity: gait instability.     I, the undersigned, certify that the above prescribed supplies are medically necessary for this patient and is both reasonable and necessary in reference to accepted standards of medical and necessary in reference to accepted standards of medical practice in the treatment of this patient's condition and is not prescribed as a convenience.     Discharge Medications   Current Discharge Medication List      CONTINUE these medications which have CHANGED    Details   sertraline (ZOLOFT) 100 MG tablet Take 1 tablet (100 mg) by mouth daily  Qty: 90 tablet, Refills: 1    Associated Diagnoses: ETHAN (generalized anxiety disorder); Moderate major depression (H)         CONTINUE these medications which have NOT CHANGED    Details   albuterol (PROAIR HFA/PROVENTIL HFA/VENTOLIN HFA) 108 (90 Base) MCG/ACT inhaler Inhale 1-2 puffs into the lungs every 4 hours as needed for shortness of breath / dyspnea or wheezing  Qty: 1 g, Refills: 11    Comments: Pharmacy may dispense brand covered by insurance (Proair, or proventil or ventolin or generic albuterol inhaler)  Associated Diagnoses: Mild intermittent asthma without complication      clonazePAM (KLONOPIN) 0.5 MG tablet Take 1 tablet (0.5 mg) by mouth nightly as needed for anxiety or sleep  Qty: 30 tablet, Refills: 0    Associated Diagnoses: Other insomnia; ETHAN (generalized anxiety disorder)      fluticasone-salmeterol (ADVAIR) 250-50 MCG/DOSE inhaler Inhale 1 puff into the lungs 2 times daily  Qty: 1 each, Refills: 6    Associated Diagnoses: Mild intermittent asthma without complication      levothyroxine (SYNTHROID/LEVOTHROID) 150  MCG tablet Take 150 mcg by mouth daily      montelukast (SINGULAIR) 10 MG tablet Take 1 tablet (10 mg) by mouth At Bedtime  Qty: 30 tablet, Refills: 1    Associated Diagnoses: Moderate persistent asthma with acute exacerbation      Multiple Vitamins-Minerals (THERAPEUTIC-M) TABS Take 1 tablet by mouth           Allergies   Allergies   Allergen Reactions     No Clinical Screening - See Comments Other (See Comments)     Grass, weeds, mold, dust     Data   Most Recent 3 CBC's:  Recent Labs   Lab Test 08/26/21  0732 08/25/21  0732 08/24/21  1130   WBC 6.0 6.3 8.0   HGB 9.7* 9.6* 10.6*   * 98 98    150 125*      Most Recent 3 BMP's:  Recent Labs   Lab Test 08/28/21  0818 08/28/21  0723 08/27/21 1955 08/27/21  0825 08/26/21  0732     --   --  131* 133   POTASSIUM 3.6  --   --  3.8 3.7   CHLORIDE 100  --   --  98 100   CO2 27  --   --  29 28   BUN 10  --   --  10 13   CR 0.74  --   --  0.69 0.67   ANIONGAP 6  --   --  4 5   NILS 9.0  --   --  8.8 8.5   * 78 114* 108* 98     Most Recent 2 LFT's:  Recent Labs   Lab Test 08/25/21  0732 08/23/21  1334   AST 83* 189*  188*   ALT 52 86*  84*   ALKPHOS 78 93  89   BILITOTAL 1.0 2.1*  2.1*     Most Recent INR's and Anticoagulation Dosing History:  Anticoagulation Dose History     Recent Dosing and Labs Latest Ref Rng & Units 1/4/2021 8/24/2021 8/25/2021    INR 0.85 - 1.15 1.00 1.08 1.04        Most Recent 3 Troponin's:No lab results found.  Most Recent 6 Bacteria Isolates From Any Culture (See EPIC Reports for Culture Details):No lab results found.  Most Recent TSH, T4 and A1c Labs:  Recent Labs   Lab Test 04/21/21  1240   TSH 0.34*   T4 1.26     Results for orders placed or performed during the hospital encounter of 08/23/21   CT Chest/Abdomen/Pelvis w Contrast    Narrative    EXAM: CT CHEST/ABDOMEN/PELVIS W CONTRAST  LOCATION: [facility]  DATE/TIME: 8/23/2021 10:22 PM    INDICATION: Chest-abdomen-pelvis  trauma, blunt. Multiple falls with decreased hemoglobin. Right upper quadrant abdominal pain and back pain. Assess for sequela of trauma.    COMPARISON: 4/22/2021.    TECHNIQUE: CT scan of the chest, abdomen and pelvis was performed following injection of IV contrast. Multiplanar reformats were obtained. Dose reduction techniques were used.     CONTRAST: 100 mL iopamidol (Isovue-370) solution.    FINDINGS:   LUNGS AND PLEURA: Minimal to moderate linear atelectasis right lung base. Hazy ill-defined ground-glass opacity superior segment right lower lobe and right middle lobe. Hazy ill-defined ground-glass opacities and nodular infiltrates involving the   posterior inferior aspect of the right upper lobe. No pneumothorax or significant pleural fluid.    MEDIASTINUM/AXILLAE: No pneumomediastinum or significant fluid in the mediastinum. Normal heart size. No pericardial fluid. Normal caliber esophagus.    CORONARY ARTERY CALCIFICATION: Moderate.    HEPATOBILIARY: Marked hepatic steatosis. No hepatic laceration or perihepatic fluid. No calcified gallstones or biliary dilatation.    PANCREAS: Normal.    SPLEEN: Normal.    ADRENAL GLANDS: Normal.    KIDNEYS/BLADDER: Normal.    BOWEL: Normal.    LYMPH NODES: No lymphadenopathy.    VASCULATURE: No evidence for acute abdominal aortic injury. Branch vessels remain patent.    PELVIC ORGANS: No significant free fluid in the pelvis.    MUSCULOSKELETAL: Acute nondisplaced fracture lateral right 8th rib (series 8, image 266). Acute nondisplaced fracture lateral right 7th rib. Possible acute nondisplaced lateral right 6th rib fracture. Multiple other old healed right-sided rib fractures.   Sternotomy. Postthoracotomy changes anterior left 3rd rib. Posttraumatic deformity subscapular aspect of the right scapula. No definitive evidence for acute displaced pelvic fracture. No evidence for overt fracture or malalignment in the thoracic or   lumbar spine. Subcutaneous edema in proximal  thighs and overlying both proximal femurs, partially visualized on the right. In the subcutaneous tissues overlying the left greater trochanter there is a tiny calcific density likely representing either old   trauma or calcification of gluteal tendons. Superior to this there is a small subcutaneous hematoma measuring 5 cm maximal diameter.      Impression    IMPRESSION:  1.  Acute nondisplaced fractures involving the lateral aspect of the right 6th-8th ribs. Multiple other old healed fractures.    2.  Old posttraumatic and postthoracotomy as well as sternotomy deformities involving the ribs and sternum.    3.  Subcutaneous edema overlying the proximal femurs and greater trochanters bilaterally, greater on the left with a 5 cm subcutaneous hematoma overlying the left greater trochanter.    4.  No solid organ injury.    5.  Minimal to moderate atelectasis right lung base.    6.  Hazy ill-defined ground-glass opacities in the right lung as detailed above suspicious for alveolar infiltrates related to pneumonia. Underlying aspiration pneumonitis could have this appearance. Given its unilaterality, this would be an atypical   presentation for COVID-19 pneumonia, but that cannot be completely excluded.    7.  Marked hepatic steatosis.   Head CT w/o contrast    Narrative    EXAM: CT HEAD W/O CONTRAST  LOCATION: [facility]  DATE/TIME: 8/23/2021 10:21 PM    INDICATION: Mental status change, unknown cause  COMPARISON: None.  TECHNIQUE: Routine CT Head without IV contrast. Multiplanar reformats. Dose reduction techniques were used.    FINDINGS:  INTRACRANIAL CONTENTS: No intracranial hemorrhage, extraaxial collection, or mass effect.  No CT evidence of acute infarct. Normal parenchymal attenuation for age. Mild generalized volume loss. No hydrocephalus.     VISUALIZED ORBITS/SINUSES/MASTOIDS: No intraorbital abnormality. No paranasal sinus mucosal disease. No middle ear or mastoid  effusion.    BONES/SOFT TISSUES: No acute abnormality.      Impression    IMPRESSION:  1.  No acute intracranial process.   Cervical spine CT w/o contrast    Narrative    EXAM: CT CERVICAL SPINE W/O CONTRAST  LOCATION: [facility]  DATE/TIME: 8/23/2021 10:21 PM    INDICATION: Traumatic injury  COMPARISON: None.  TECHNIQUE: Routine CT Cervical Spine without IV contrast. Multiplanar reformats. Dose reduction techniques were used.    FINDINGS:  VERTEBRA: Mild reversal and left apex curvature of the cervical spine. No spondylolisthesis. Vertebral body heights are preserved. No fracture or posttraumatic subluxation.     CANAL/FORAMINA: Multilevel degenerative changes at least mild canal stenosis at C3-C4. Multilevel bilateral neural foraminal narrowing, with moderate to severe right-sided stenosis at C3-C4 and at least mild bilateral stenosis at C4-C5.    PARASPINAL: No acute extraspinal abnormality. Atherosclerotic calcification of the carotid bifurcations. Visualized portions of the lung apices are clear.      Impression    IMPRESSION:  1.  Degenerative changes of the cervical spine. No evidence of an acute displaced fracture.   CT Thoracic Spine w/o Contrast    Narrative    EXAM: CT THORACIC SPINE W/O CONTRAST  LOCATION: [facility]  DATE/TIME: 8/23/2021 10:21 PM    INDICATION: Traumatic injury.    COMPARISON: CT abdomen and pelvis dated 4/22/2021.    TECHNIQUE: Dedicated axial, sagittal, and coronal images of the Thoracic Spine were generated utilizing CT chest source data and are separately reviewed. Dose reduction techniques were used.     FINDINGS:  VERTEBRA: Left apex curvature of the thoracic spine. Asymmetric right-sided compression deformity at T6 resulting in approximately 15% right lateral height loss. Given the lack of overlying soft tissue swelling, findings may be chronic in age. There is a   chronic mild superior  endplate compression deformity at T9 that is unchanged from the prior CT abdomen and pelvis. Chronic deformities of the right 9th through 10th posterior right ribs at the costovertebral junction. Chronic deformity of the left 10th   rib at the costovertebral junction.     CANAL/FORAMINA: Mild degenerative changes without osseous spinal canal or neural foraminal narrowing.    PARASPINAL: See separately dictated chest CT for intrathoracic findings.      Impression    IMPRESSION:  1.  Chronic-appearing mild compression deformity at T6.  2.  Stable mild chronic superior endplate compression deformity at T9.  3.  No high grade osseous spinal canal or neural foraminal narrowing.     Lumbar spine CT w/o contrast    Narrative    EXAM: CT LUMBAR SPINE W/O CONTRAST  LOCATION: [facility]  DATE/TIME: 8/23/2021 10:21 PM    INDICATION: Low back pain, trauma.    COMPARISON: CT abdomen and pelvis dated 4/22/2021.    TECHNIQUE: Routine CT Lumbar Spine without IV contrast. Multiplanar reformats. Dose reduction techniques were used.     FINDINGS:  VERTEBRA: Right apex curvature of the upper lumbar spine and left apex curvature of the lower lumbar spine. Grade 1 anterolisthesis of L2 on L3. Vertebral body heights are preserved. No fracture or posttraumatic subluxation.     CANAL/FORAMINA: Multilevel degenerative changes without high grade spinal canal stenosis. There is at least moderate neural foraminal narrowing on the right at L4-L5 and bilaterally at L5-S1.    PARASPINAL: See separately dictated CT abdomen and pelvis for intra-abdominal findings.      Impression    IMPRESSION:  1.  No acute osseous abnormality of the lumbar spine.  2.  Degenerative changes as described.   XR Chest Port 1 View    Narrative    Portable chest    INDICATION: Follow-up multiple right-sided rib fractures    COMPARISON: Prior outside plain radiograph 8/12/2020 in correlation  with chest CT  8/23/2021    Findings: Heart size appears normal. Mild elevation right  hemidiaphragm. Multiple old right rib fractures noted. Median  sternotomy. Possible atelectasis or pulmonary contusion in the right  mid to lower lung field.      Impression    IMPRESSION: Elevated right hemidiaphragm with either atelectasis or  small pulmonary contusion noted likely in the right mid to lower lung  field with differential consideration also including infection.  Multiple old right rib fractures. Median sternotomy.    DONOVAN MYRICK MD         SYSTEM ID:  [id number]   XR Chest Port 1 View    Narrative    EXAM: XR CHEST PORT 1 VIEW  8/26/2021 12:29 PM     HISTORY:  follow up multiple right sided rib fractures       COMPARISON:  Chest radiograph 8/25/2021    FINDINGS:   Portable AP semiupright view of the chest. Postsurgical chest with  median sternotomy wires and mediastinal surgical clips. Fractured  inferior most sternotomy wire. Trachea is midline. Asymmetric  elevation of the right hemidiaphragm, unchanged from prior. Unchanged  bibasilar streaky  opacities, right greater than left. No significant  pleural effusion. No appreciable pneumothorax. Right heart border is  partially obscured. Multiple old right rib fractures are again  visualized.      Impression    IMPRESSION:  1. Unchanged asymmetric elevation of the right hemidiaphragm with  stable bibasilar streaky opacities, right greater than left, likely  consistent with atelectasis versus pulmonary contusion. Cannot exclude  infection.  2. Multiple old right rib fractures are again seen without significant  change. No appreciable pneumothorax.    I have personally reviewed the examination and initial interpretation  and I agree with the findings.    DONOVAN MYRICK MD         SYSTEM ID:  [id number]   XR Chest Port 1 View    Narrative    EXAM: XR CHEST PORT 1 VIEW  8/27/2021 9:15 AM     HISTORY:  follow up multiple right sided rib fractures       COMPARISON:  Radiographs  8/26/2021, 8/25/2021. CT 8/23/2021.    TECHNIQUE: Single portable semiupright AP view of the chest    FINDINGS:   Devices, lines, tubes: Median sternotomy wires with stable fractures  of the inferior most wire and the third most superior wire. This  report was projecting over the mediastinum.     Improved left basilar streaky opacities with unchanged right basilar  streaky opacities. The trachea is midline. The cardiomediastinal  silhouette is within normal limits. The pulmonary vasculature is  distinct.  No appreciable pneumothorax, pleural effusion, or focal  consolidative opacity. No acute osseous abnormality.  Stable  asymmetric elevation of the right hemidiaphragm. Multiple right-sided  rib fractures.      Impression    IMPRESSION:   1. Stable asymmetric elevation right hemidiaphragm with unchanged  streaky right basilar opacities, possibilities include atelectasis  versus pulmonary contusion although infection cannot be ruled out.  2. Multiple right rib fractures are unchanged, no appreciable  pneumothorax.  3. Improved left basilar streaky opacities.    I have personally reviewed the examination and initial interpretation  and I agree with the findings.    DONOVAN MYRICK MD         SYSTEM ID:  [id number]   XR Chest Port 1 View    Narrative    EXAM: XR CHEST PORT 1 VIEW  8/28/2021 8:11 AM     HISTORY:  follow up multiple right sided rib fractures       COMPARISON:  Radiographs 8/27/2021, 8/26/2021    TECHNIQUE: Portable AP radiograph of the chest.    FINDINGS:   Postsurgical changes of the chest with unchanged sternotomy wires,  including fractures of the third from the top and the bottom wires.  Mediastinal surgical clips. The trachea is midline. Normal  cardiomediastinal silhouette. Improved perihilar and basilar  atelectasis. Stable elevation of the right hemidiaphragm. Stable  appearance of the right-sided rib fractures. No pneumothorax. No  pleural effusion.      Impression    IMPRESSION:   1. Stable  chest with unchanged right-sided rib fractures. No  pneumothorax.  2. Improved perihilar and basilar atelectasis.  3. Unchanged right hemidiaphragm elevation.     I have personally reviewed the examination and initial interpretation  and I agree with the findings.    TRISTA CHUN MD         SYSTEM ID:  [id number]       Time Spent on this Encounter   I, Jenny Mcmahan PA-C, personally saw the patient today and spent greater than 30 minutes discharging this patient.    We appreciate the opportunity to care for your patient while in the hospital.  Should you have any questions about their injuries or this discharge summary our contact information is below.    Trauma Services  [facility]   Department of Critical Care and Acute Care Surgery  [address] [address]  Office: [phone]

## 2021-08-30 DIAGNOSIS — S22.41XA CLOSED FRACTURE OF MULTIPLE RIBS OF RIGHT SIDE, INITIAL ENCOUNTER: ICD-10-CM

## 2021-08-30 DIAGNOSIS — E03.9 ACQUIRED HYPOTHYROIDISM: Primary | ICD-10-CM

## 2021-08-30 RX ORDER — CYCLOBENZAPRINE HCL 5 MG
5 TABLET ORAL 3 TIMES DAILY PRN
Qty: 20 TABLET | OUTPATIENT
Start: 2021-08-30

## 2021-08-30 RX ORDER — LEVOTHYROXINE SODIUM 150 UG/1
150 TABLET ORAL DAILY
Qty: 90 TABLET | Refills: 1 | Status: SHIPPED | OUTPATIENT
Start: 2021-08-30

## 2021-08-30 NOTE — TELEPHONE ENCOUNTER
[facility] Call Center    Phone Message    May a detailed message be left on voicemail: yes    Reason for Call: Medication Refill Request    Has the patient contacted the pharmacy for the refill? Yes   Name of medication being requested: levothyroxine (SYNTHROID/LEVOTHROID) 150 MCG tablet and cyclobenzaprine (FLEXERIL) 5 MG tablet    Provider who prescribed the medication: [facility]  [facility] [id number] [place] [address] [facility]    Date medication is needed: ASAP    Action Taken: Message routed to:  [facility] ([facility]    Travel Screening: Not Applicable

## 2021-08-30 NOTE — TELEPHONE ENCOUNTER
levothyroxine (SYNTHROID/LEVOTHROID) 150 MCG tablet      Historical entry  Last Office Visit : 7/1/21  Future Office visit:  None scheduled    Routing refill request to provider for review/approval because:  Medication is reported/historical  Abnormal TSH    Lab Test 04/21/21  1240   TSH 0.34*       cyclobenzaprine (FLEXERIL) 5 MG tablet      Last Written Prescription Date:  8/28/21  Last Fill Quantity: 20,   # refills: 0  Last Office Visit : 7/1/21  Future Office visit:  None scheduled    Routing refill request to provider for review/approval because:  Drug not on primary care refill protocol

## 2021-08-30 NOTE — TELEPHONE ENCOUNTER
Called patient. Informed him that he should schedule an appt to re-establish care with a provider in order to continue to receive refills. Patient understands and will call to schedule a follow up.    Gamal Roland MA on 8/30/2021 at 2:13 PM

## 2021-08-31 RX ORDER — CLONAZEPAM 0.5 MG/1
0.5 TABLET ORAL
Qty: 30 TABLET | Refills: 0 | Status: SHIPPED | OUTPATIENT
Start: 2021-08-31

## 2021-09-09 ENCOUNTER — TELEPHONE (OUTPATIENT)
Dept: FAMILY MEDICINE | Facility: CLINIC | Age: 51
End: 2021-09-09

## 2021-09-09 ENCOUNTER — VIRTUAL VISIT (OUTPATIENT)
Dept: FAMILY MEDICINE | Facility: CLINIC | Age: 51
End: 2021-09-09
Payer: COMMERCIAL

## 2021-09-09 DIAGNOSIS — M25.571 PAIN IN JOINT INVOLVING ANKLE AND FOOT, RIGHT: ICD-10-CM

## 2021-09-09 DIAGNOSIS — R07.81 RIB PAIN: ICD-10-CM

## 2021-09-09 PROCEDURE — 99214 OFFICE O/P EST MOD 30 MIN: CPT | Mod: TEL | Performed by: NURSE PRACTITIONER

## 2021-09-09 ASSESSMENT — PAIN SCALES - GENERAL: PAINLEVEL: MODERATE PAIN (4)

## 2021-09-09 NOTE — PATIENT INSTRUCTIONS

## 2021-09-09 NOTE — PROGRESS NOTES
Ming is a 50 year old who is being evaluated via a billable video visit.      How would you like to obtain your AVS? [facility] [employer]  If the video visit is dropped, the invitation should be resent by: Other e-mail: [email or web address]  Will anyone else be joining your video visit? No      Video Start Time: 1628    Subjective   Ming is a 50 year old who presents for follow up after a recent hospitalization.     HPI   Date of Admission:  8/23/2021  Date of Discharge:  8/28/2021  Attending Physician: Dr. Damion Coe            Discharging Provider: Jenny DE LEON             Date of Service (when I saw the patient): 08/28/21     Primary Provider: Sofi Webster  Primary Care clinic: [address] [place] [address]  Phone: [phone]  Fax number: [phone]      Discharge Diagnoses     Active Problems:    Alcohol withdrawal syndrome with complication (H)    Closed fracture of multiple ribs of right side, initial encounter    Hematoma of lower extremity, unspecified laterality, initial encounter    Hospital Course    Rib Fractures:  In rib fracture management, pulmonary toilet and good pain control allowing for good pulmonary toilet is paramount. Prior to discharge, his pain was controlled for Ming Uribe with scheduled acetaminophen, flexiril, NSAIDS, topical pain medications, PRN oral analgesics     Community acquired pneumonia  Right middle/lower lobe infiltrates post traumatic rib fractures, hypoxia, with associated leukocytosis, will treat for community acquired pneumonia, initially received a dose of Levaquin transitioned to - Doxycycline + Ceftriaxone due to prolonged QTc. Patient was discharge with Doxycyline to complete a 5 day course for CAP.      Large left greater than right thigh hematoma's  Supportive cares, warm packs for comfort  Lidoderm patches available for pain     ETOH  # Alcoholic hepatitis without ascites  # Transaminitis  - Long history of alcohol misuse, CT abdomen hepatic  steatosis, no ascites, denies abdominal pain, total bilirubin 2.1, INR 1.08.  - The patient was screened for hazardous consumption of alcohol and dependence. Ming Uribe was placed on the CIWA protocol and monitored closely for withdrawal. Addiction Medicine was consulted. The correlation of ETOH use and accidents/injuries were discussed with the patient.  The importance of abstaining from ETOH use while healing from existing injuries after discharge was reviewed with the patient. Patient was in touch with the [facility] during admission and his brother will provide a ride.      Electrolytes  # Hyponatremia, resolved   # Hypokalemia, resolved   # Hypophosphatemia, resolved   On admission the patient had electrolyte abnormalities that are common in patients with alcohol dependence. His sodium was replaced with normal saline IV, and potassium and phosphorus were replaced pre protocol.     Acute on Chronic anemia  - Hemoglobin 12.1, INR 1.08 drifted to 9.6 today. Mostly dilutional, apprx 2L IVF x24 hours, no over signs of bleeding noted. Threshold for transfusion if hgb less than 7.0 or signs/symptoms of hypoperfusion. Patient did not require transfusions of blood products during admission.      Therapy Recommendations:   Current status of physical therapies on discharge: home with assist;home with outpatient physical therapy. PT: Pt displayed safe ambulation with FWW which will be provided at d/c. CGA/SBA for gait with FWW     Current status of occupational therapies on discharge: Transitional Care Facility. Pt below baseline, would benefit from continued therapy to increase safety and independence with ADLs. If pt discharges home, would recommend home w/ A (ginny with higher-level IADLs) d/t cognitive deficits. SLUMs improved to 22/30, ModA ambulation in room with 2WW, posterior lean, ModA toilet transfer    Currently:  Alcoholism: Ming is actively drinking alcohol at home, his current plan is to wean  "off alcohol over the next week prior to admission into [facility]. He states, \"It's time, I need to be done drinking.\"  Rib pain: Ming reports that this continues, he is taking otc analgesia for this with moderate results.   Pain in joint of right ankle: Ming reports that the swelling in his ankle has decreased. He is able to walk on his ankle better.     Review of Systems   Constitutional, HEENT, cardiovascular, pulmonary, gi and gu systems are negative, except as otherwise noted.      Objective    Vitals - Patient Reported  Pain Score: Moderate Pain (4)    Physical Exam   GENERAL: Healthy, alert and no distress  EYES: Eyes grossly normal to inspection.  No discharge or erythema, or obvious scleral/conjunctival abnormalities.  RESP: No audible wheeze, cough, or visible cyanosis.  No visible retractions or increased work of breathing.    SKIN: Visible skin clear. No significant rash, abnormal pigmentation or lesions.  Musculoskeletal: Right ankle has slight swelling compared to left ankle  NEURO: Cranial nerves grossly intact.  Mentation and speech appropriate for age. Walking around apartment independently upon exam via video.   PSYCH: Mentation appears normal, affect normal/bright, judgement and insight intact, normal speech and appearance well-groomed.    No Diagnostics ordered today.     Video-Visit Details    Type of service:  Video Visit    Video End Time:1650    Originating Location (pt. Location): Home    Distant Location (provider location):  Provider's home    Platform used for Video Visit: [facility]     Assessment & Plan     Alcoholism /alcohol abuse (H)      Rib pain      Pain in joint involving ankle and foot, right    Return if symptoms worsen or fail to improve.    Patient is requesting records be sent to [facility]. The PAUL will be emailed to him and he will return this by fax. HIM will be contacted and records will be sent. Writer will email letter to contact at [facility]. Ming will " return for follow up as needed. CHUCK Wood, JOSÉ MANUEL  Addendum 09/13/2021 0700:Called and spoke with Denise Curtis at [facility]. Notified her that Ming will complete PAUL and contact HIM. I also notified her that I will be emailing her a letter per Ming's request.   CHUCK Wood, CNP

## 2021-09-09 NOTE — LETTER
September 14, 2021      Ming Uribe  [address]  [place] [address]    Dear [facility] Grande at [facility],     Our clinic provides primary care for Ming. He has requested that a letter be sent to your institution regarding his pending admission for alcohol treatment.   First, he was hospitalized recently from 08/23-08/28/2021. Please see recent progress note dated 09/09/2021. Upon exam via video for his visit on 09/09/2021, he was walking independently in his apartment. He reported that his rib and right ankle pain were both managed appropriately. He did not appear unsteady while observed at that time. It seems that his fall risk and or increased risk for injury are related to his alcohol consumption. Knowing that he will not be consuming alcohol while in treatment, I feel that he would also not be at risk for falling.    Next, in regards to his history of asthma. Ming was recently treated for Pneumonia during his recent hospitalization, this has resolved. In addition to this, he has been prescribed an Albuterol inhaler, an Advair inhaler and oral Singulair prior to and while he has been a patient in this clinic. These medications have controlled his asthma and he has not required to be hospitalized for this condition in the past year Thank you for your consideration.     Sincerely,      CHUCK Wood, CNP

## 2021-09-09 NOTE — NURSING NOTE
Chief Complaint   Patient presents with     Letter Request     Pt is requesting a letter for rehab.     Mariela Solano, ZOYA 2:32 PM  9/9/2021  [facility] [phone]

## 2021-09-09 NOTE — TELEPHONE ENCOUNTER
LVM attempted to call pt multiple times, but no answer. Please schedule an appt to discuss conerns.     ZOYA Ceja 1:43 PM  9/9/2021  [facility] [phone]

## 2021-09-13 ENCOUNTER — TELEPHONE (OUTPATIENT)
Dept: FAMILY MEDICINE | Facility: CLINIC | Age: 51
End: 2021-09-13

## 2021-09-13 NOTE — TELEPHONE ENCOUNTER
[facility] Call Center    Phone Message    May a detailed message be left on voicemail: yes     Reason for Call: Form or Letter   Type or form/letter needing completion: Consent Form  Provider: Sudhir Garcia form needed: ASAP  Once completed E-MAIL:  [email or web address]    Pt calling stating he hasn't received the consent form from PCP and he needs it ASAP. pls follow up with pt.  Thank you    Action Taken: Message routed to:  [facility] NP    Travel Screening: Not Applicable

## 2021-09-13 NOTE — TELEPHONE ENCOUNTER
Spoke to pt. PAUL was emailed to pt. Pt will fax it back to us, then we will fax it over to our medical records team.     ZOYA Ceja 12:49 PM  9/13/2021  [facility] [phone]

## 2021-09-14 ENCOUNTER — TELEPHONE (OUTPATIENT)
Dept: FAMILY MEDICINE | Facility: CLINIC | Age: 51
End: 2021-09-14

## 2021-09-14 NOTE — TELEPHONE ENCOUNTER
Patients significant other calling requesting the status of PAUL, Amanda stated she was on hold for Yfn from Medical records requesting the status of a form for fall risk so patient can be admitted to [facility] and medical records stated they found the form but it was not signed by Kianna Peguero. Amanda states she is concerned for patients life if he does not get admitted very soon. Please call to discuss thank you.

## 2021-09-14 NOTE — TELEPHONE ENCOUNTER
Letter was emailed to Denise Grande from [facility]. They will be calling pt to schedule an appt.     ZOYA Ceja 9:15 AM  9/14/2021  [facility] [phone]

## 2021-10-05 DIAGNOSIS — N52.1 ERECTILE DYSFUNCTION DUE TO DISEASES CLASSIFIED ELSEWHERE: ICD-10-CM

## 2021-10-05 RX ORDER — SILDENAFIL 50 MG/1
50 TABLET, FILM COATED ORAL DAILY PRN
Qty: 6 TABLET | Refills: 4 | OUTPATIENT
Start: 2021-10-05

## 2021-10-07 ENCOUNTER — TELEPHONE (OUTPATIENT)
Dept: GASTROENTEROLOGY | Facility: CLINIC | Age: 51
End: 2021-10-07

## 2021-10-07 NOTE — TELEPHONE ENCOUNTER
[facility] Call Center    Phone Message    May a detailed message be left on voicemail: yes     Reason for Call: Medication Refill Request    Has the patient contacted the pharmacy for the refill? Yes   Name of medication being requested: Sildenafil 50 mg  Provider who prescribed the medication: Dr. Piña  Pharmacy: [facility]  Date medication is needed: ASAP     Patient is out of medication      Action Taken: Message routed to:  [facility]    Travel Screening: Not Applicable

## 2021-10-08 NOTE — TELEPHONE ENCOUNTER
Per Dr. Piña pt should request additional refills from his PCP. Pt updated and agreeable with the plan.    Michelle MORRIS LPN  Hepatology Clinic

## 2021-10-10 ENCOUNTER — HEALTH MAINTENANCE LETTER (OUTPATIENT)
Age: 51
End: 2021-10-10

## 2021-10-13 ENCOUNTER — VIRTUAL VISIT (OUTPATIENT)
Dept: FAMILY MEDICINE | Facility: CLINIC | Age: 51
End: 2021-10-13
Payer: COMMERCIAL

## 2021-10-13 DIAGNOSIS — F10.21 HISTORY OF ALCOHOLISM (H): ICD-10-CM

## 2021-10-13 DIAGNOSIS — N52.2 DRUG-INDUCED ERECTILE DYSFUNCTION: Primary | ICD-10-CM

## 2021-10-13 PROCEDURE — 99213 OFFICE O/P EST LOW 20 MIN: CPT | Mod: GT | Performed by: NURSE PRACTITIONER

## 2021-10-13 RX ORDER — SILDENAFIL 50 MG/1
50 TABLET, FILM COATED ORAL DAILY PRN
Qty: 30 TABLET | Refills: 5 | Status: SHIPPED | OUTPATIENT
Start: 2021-10-13

## 2021-10-13 NOTE — NURSING NOTE
Chief Complaint   Patient presents with     Recheck Medication     Patient calls in for a follow up.         Gamal Roland MA on 10/13/2021 at 12:44 PM

## 2021-10-13 NOTE — PROGRESS NOTES
Ming is a 50 year old who is being evaluated via a billable video visit.      How would you like to obtain your AVS? MyChart  If the video visit is dropped, the invitation should be resent by: Text to cell phone: [phone]   Will anyone else be joining your video visit? No      Video Start Time: 1303    Subjective   Ming is a 50 year old who presents to discuss lab orders and ED.     HPI   Drug induced ED: Ming is taking Zoloft 100 mg daily for anxiety and depression. He feels that the Zoloft is working well to treat his mental health, however he has noticed some ED since starting the Zoloft. He has used Sildenafil in the past which has been helpful. He would like a refill of this.   History of alcoholism: Ming reports he has been sober for 34 days. He was at [facility] for 19 days and has been home since. Ming is attending intensive outpatient therapy for 3 hours per day and attending AA meetings at night. Ming had a full panel of labs completed while in treatment and would like to have these lab tests repeated.       Review of Systems   Constitutional, HEENT, cardiovascular, pulmonary, gi and gu systems are negative, except as otherwise noted.      Objective         Vitals:  No vitals were obtained today due to virtual visit.    Physical Exam   GENERAL: Healthy, alert and no distress  EYES: Eyes grossly normal to inspection.  No discharge or erythema, or obvious scleral/conjunctival abnormalities.  RESP: No audible wheeze, cough, or visible cyanosis.  No visible retractions or increased work of breathing.    SKIN: Visible skin clear. No significant rash, abnormal pigmentation or lesions.  NEURO: Cranial nerves grossly intact.  Mentation and speech appropriate for age.  PSYCH: Mentation appears normal, affect normal/bright, judgement and insight intact, normal speech and appearance well-groomed.    Labs pending.       Video-Visit Details    Type of service:  Video Visit    Video End  Time:1320    Originating Location (pt. Location): Home    Distant Location (provider location):  [facility] [place]     Platform used for Video Visit: Tarsha     Assessment & Plan     Drug-induced erectile dysfunction    - sildenafil (VIAGRA) 50 MG tablet; Take 1 tablet (50 mg) by mouth daily as needed (erectile dysfunction) 1 hour before sexual activity; may be taken up to 4 hours before sexual activity.    History of alcoholism (H)    - Comprehensive metabolic panel; Future  - Magnesium; Future  - CBC with platelets; Future      First, please take Sildenafil for ED as needed. Next, follow up if ineffective. Next, have labs completed and follow up on abnormal results with one of my colleagues. He verbalizes understanding of the plan.   CHUCK Wood, CNP

## 2021-10-13 NOTE — PATIENT INSTRUCTIONS

## 2021-11-06 ENCOUNTER — APPOINTMENT (OUTPATIENT)
Dept: CT IMAGING | Facility: CLINIC | Age: 51
End: 2021-11-06
Attending: EMERGENCY MEDICINE
Payer: COMMERCIAL

## 2021-11-06 ENCOUNTER — HOSPITAL ENCOUNTER (EMERGENCY)
Facility: CLINIC | Age: 51
Discharge: HOME | End: 2021-11-07
Attending: EMERGENCY MEDICINE | Admitting: EMERGENCY MEDICINE
Payer: COMMERCIAL

## 2021-11-06 ENCOUNTER — APPOINTMENT (OUTPATIENT)
Dept: GENERAL RADIOLOGY | Facility: CLINIC | Age: 51
End: 2021-11-06
Attending: EMERGENCY MEDICINE
Payer: COMMERCIAL
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
## 2021-11-06 DIAGNOSIS — F10.920 ALCOHOLIC INTOXICATION WITHOUT COMPLICATION (H): ICD-10-CM

## 2021-11-06 DIAGNOSIS — S22.42XA CLOSED FRACTURE OF MULTIPLE RIBS OF LEFT SIDE, INITIAL ENCOUNTER: ICD-10-CM

## 2021-11-06 PROCEDURE — 70450 CT HEAD/BRAIN W/O DYE: CPT | Mod: 26 | Performed by: RADIOLOGY

## 2021-11-06 PROCEDURE — 94640 AIRWAY INHALATION TREATMENT: CPT | Performed by: EMERGENCY MEDICINE

## 2021-11-06 PROCEDURE — 71101 X-RAY EXAM UNILAT RIBS/CHEST: CPT | Mod: LT

## 2021-11-06 PROCEDURE — 99284 EMERGENCY DEPT VISIT MOD MDM: CPT | Performed by: EMERGENCY MEDICINE

## 2021-11-06 PROCEDURE — 99284 EMERGENCY DEPT VISIT MOD MDM: CPT | Mod: 25 | Performed by: EMERGENCY MEDICINE

## 2021-11-06 PROCEDURE — 250N000013 HC RX MED GY IP 250 OP 250 PS 637: Performed by: EMERGENCY MEDICINE

## 2021-11-06 PROCEDURE — 70450 CT HEAD/BRAIN W/O DYE: CPT

## 2021-11-06 PROCEDURE — 71101 X-RAY EXAM UNILAT RIBS/CHEST: CPT | Mod: 26 | Performed by: RADIOLOGY

## 2021-11-06 RX ORDER — ALBUTEROL SULFATE 90 UG/1
2 AEROSOL, METERED RESPIRATORY (INHALATION) ONCE
Status: COMPLETED | OUTPATIENT
Start: 2021-11-06 | End: 2021-11-06

## 2021-11-06 RX ADMIN — ALBUTEROL SULFATE 2 PUFF: 90 AEROSOL, METERED RESPIRATORY (INHALATION) at 23:56

## 2021-11-06 ASSESSMENT — MIFFLIN-ST. JEOR: SCORE: 1601.25

## 2021-11-07 VITALS
BODY MASS INDEX: 23.04 KG/M2 | DIASTOLIC BLOOD PRESSURE: 100 MMHG | HEIGHT: 70 IN | TEMPERATURE: 98.3 F | WEIGHT: 160.94 LBS | HEART RATE: 91 BPM | SYSTOLIC BLOOD PRESSURE: 147 MMHG | RESPIRATION RATE: 16 BRPM | OXYGEN SATURATION: 94 %

## 2021-11-07 NOTE — ED PROVIDER NOTES
"ED Provider Note  [facility]      History     Chief Complaint   Patient presents with     Fall     Alcohol Intoxication     HPI  Ming Uribe is a 50 year old male who who presents with alcohol intoxication and fall.  Patient states he has been drinking tonight approximately 1 pint of vodka.  He has had multiple falls.  He notes falling on his left side and injuring his ribs.  He also notes striking his head.  Patient initially required restraints per EMS but on evaluation in the ED he is awake alert and cooperative.  He notes pain in his left side of his chest.  He denies any drug use.  No other symptoms noted.          Review of Systems  A complete review of systems was performed with pertinent positives and negatives noted in the HPI, and all other systems negative.    Physical Exam   BP: (!) 148/114  Pulse: 91  Temp: 98.3  F (36.8  C)  Resp: 18  Height: 177.8 cm (5' 10\")  Weight: 73 kg (160 lb 15 oz)  SpO2: 95 %  Physical Exam  Constitutional:       General: He is not in acute distress.     Appearance: He is well-developed. He is not diaphoretic.   HENT:      Head: Normocephalic and atraumatic.      Mouth/Throat:      Pharynx: No oropharyngeal exudate.   Eyes:      General: No scleral icterus.        Right eye: No discharge.         Left eye: No discharge.      Pupils: Pupils are equal, round, and reactive to light.   Cardiovascular:      Rate and Rhythm: Normal rate and regular rhythm.      Heart sounds: Normal heart sounds. No murmur heard.   No friction rub. No gallop.    Pulmonary:      Effort: Pulmonary effort is normal. No respiratory distress.      Breath sounds: Normal breath sounds. No wheezing.   Chest:      Chest wall: No tenderness.          Comments: Healed sternotomy scar.  Abdominal:      General: Bowel sounds are normal. There is no distension.      Palpations: Abdomen is soft.      Tenderness: There is no abdominal tenderness.   Musculoskeletal:         General: " No tenderness or deformity. Normal range of motion.      Cervical back: Normal range of motion and neck supple.   Skin:     General: Skin is warm and dry.      Coloration: Skin is not pale.      Findings: No erythema or rash.   Neurological:      Mental Status: He is alert and oriented to person, place, and time.      Cranial Nerves: No cranial nerve deficit.         ED Course      Procedures              Results for orders placed or performed during the hospital encounter of 11/06/21   CT Head w/o Contrast     Status: None    Narrative    EXAM: CT HEAD W/O CONTRAST  LOCATION: [facility]  DATE/TIME: 11/6/2021 10:44 PM    INDICATION: Traumatic head injury. Fall. Struck head.  COMPARISON: 08/23/2021  TECHNIQUE: Routine CT Head without IV contrast. Multiplanar reformats. Dose reduction techniques were used.    FINDINGS:  INTRACRANIAL CONTENTS: No intracranial hemorrhage, extraaxial collection, or mass effect.  No CT evidence of acute infarct. Normal parenchymal attenuation. Mild generalized volume loss. No hydrocephalus.     VISUALIZED ORBITS/SINUSES/MASTOIDS: No intraorbital abnormality. No paranasal sinus mucosal disease. No middle ear or mastoid effusion.    BONES/SOFT TISSUES: Left occipital scalp swelling. No evidence of an underlying calvarial fracture.      Impression    IMPRESSION:  1.  No acute intracranial process.  2.  Left occipital scalp swelling. No evidence of an acute calvarial fracture.   XR Rib & Chest Port Left G/E 3 Views     Status: None    Narrative    EXAM: XR RIBS and CHEST PORT LT 3 VIEWS  LOCATION: [facility]  DATE/TIME: 11/6/2021 10:03 PM    INDICATION: Fall, L rib pain.  COMPARISON: 08/28/2021      Impression    IMPRESSION: Heart size is normal, status post sternotomy and CABG. Right midlung platelike atelectasis. No visible pneumothorax or pleural effusion. There are acute appearing fractures of the  left lateral seventh-ninth ribs.     Medications   albuterol (PROAIR HFA/PROVENTIL HFA/VENTOLIN HFA) 108 (90 Base) MCG/ACT inhaler 2 puff (2 puffs Inhalation Given 11/6/21 1946)        Assessments & Plan (with Medical Decision Making)   This 50-year-old male who presents with alcohol intoxication and falls.  Patient has been drinking and fell several times today.  He does not strike his head as well as his left side.  On exam he has left-sided chest tenderness.  No other acute abnormalities.  Chest x-ray shows acute appearing fractures on ribs 7 through 9 on the left.  Head CT shows no acute abnormalities does show scalp hematoma.  Patient was monitored in the emergency department.  I discussed all results with patient.  Patient is currently awake alert and clinically sober.  Patient is requesting discharge home.  Discussed risks associated with broken ribs including pneumonia.  Discussed use of incentive spirometer.  Patient states he has 1 of these at home and will use it.  Offered inpatient detox, patient declines at this time. Will discharge home with return precautions. Discussed reasons to return to the emergency department.  Patient understands and agrees with this plan.    I have reviewed the nursing notes. I have reviewed the findings, diagnosis, plan and need for follow up with the patient.    Discharge Medication List as of 11/7/2021  1:45 AM          Final diagnoses:   Fall, initial encounter   Closed fracture of multiple ribs of left side, initial encounter   Alcoholic intoxication without complication (H)       --  Xavi Flanagan DO  [facility] EMERGENCY DEPARTMENT  11/6/2021     Xavi Flanagan DO  11/07/21 0120
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
## 2021-12-10 ENCOUNTER — TELEPHONE (OUTPATIENT)
Dept: FAMILY MEDICINE | Facility: CLINIC | Age: 51
End: 2021-12-10
Payer: COMMERCIAL

## 2021-12-10 NOTE — TELEPHONE ENCOUNTER
[facility] Call Center    Phone Message    May a detailed message be left on voicemail: yes     Reason for Call: Medication Refill Request    Has the patient contacted the pharmacy for the refill? Yes   Name of medication being requested: albuterol (PROAIR HFA/PROVENTIL HFA/VENTOLIN HFA) 108 (90 Base) MCG/ACT inhaler [4595] (Order [id number]    Provider who prescribed the medication: Kianna Peguero  Pharmacy: [facility] [id number] [place], [place] - [address]  Date medication is needed: 12/10/21         Action Taken: Message routed to:  [facility] NP    Travel Screening: Not Applicable

## 2021-12-10 NOTE — TELEPHONE ENCOUNTER
Call to Ming, message left of having refills at requested pharmacy, advised to contact pharmacy for refill

## 2022-01-30 ENCOUNTER — HEALTH MAINTENANCE LETTER (OUTPATIENT)
Age: 52
End: 2022-01-30

## 2022-05-23 NOTE — ADDENDUM NOTE
Addended by: GARRET BARNES on: 8/31/2021 04:15 PM     Modules accepted: Orders     Physical Therapy Discharge Summary Addendum:  Date: 5/23/2022  Total Number of Visits: 5  Referred by: Toya Villavicencio NP  Medical Diagnosis (from order):   Diagnosis Information      Diagnosis    729.5 (ICD-9-CM) - M79.604, M79.605 (ICD-10-CM) - Bilateral leg pain                Patient discharged due to not scheduling more appointments.  G-codes: unable to report due to status unknown.  Status of goals: based on last known status anticipate goals partially met  DO NOT SELECT ANYTHING IN THIS LIST

## 2022-09-24 ENCOUNTER — HEALTH MAINTENANCE LETTER (OUTPATIENT)
Age: 52
End: 2022-09-24

## 2023-05-08 ENCOUNTER — HEALTH MAINTENANCE LETTER (OUTPATIENT)
Age: 53
End: 2023-05-08

## 2024-07-14 ENCOUNTER — HEALTH MAINTENANCE LETTER (OUTPATIENT)
Age: 54
End: 2024-07-14